# Patient Record
Sex: MALE | Race: WHITE | NOT HISPANIC OR LATINO | Employment: OTHER | URBAN - METROPOLITAN AREA
[De-identification: names, ages, dates, MRNs, and addresses within clinical notes are randomized per-mention and may not be internally consistent; named-entity substitution may affect disease eponyms.]

---

## 2021-05-17 ENCOUNTER — TRANSFERRED RECORDS (OUTPATIENT)
Dept: HEALTH INFORMATION MANAGEMENT | Facility: CLINIC | Age: 81
End: 2021-05-17

## 2021-08-25 ENCOUNTER — TELEPHONE (OUTPATIENT)
Dept: ORTHOPEDICS | Facility: CLINIC | Age: 81
End: 2021-08-25

## 2021-08-25 NOTE — TELEPHONE ENCOUNTER
RECORDS RECEIVED FROM: Compression on lower disk / Self / [facility]   DATE RECEIVED: Sep 2, 2021     NOTES STATUS DETAILS   OFFICE NOTE from referring provider In process    OFFICE NOTE from other specialist N/A    DISCHARGE SUMMARY from hospital N/A    DISCHARGE REPORT from the ER N/A    OPERATIVE REPORT N/A    MEDICATION LIST Internal    IMPLANT RECORD/STICKER N/A    LABS     CBC/DIFF N/A    CULTURES N/A    INJECTIONS DONE IN RADIOLOGY N/A    MRI In process    CT SCAN In process    XRAYS (IMAGES & REPORTS) In process    TUMOR     PATHOLOGY  Slides & report N/A    08/31/21   9:18 AM   PATIENT CALLED AND LVM SAYING HE HAS RECORDS. CALLED AND SPOKE TO PATIENT HE IS BRINGING HIS RECORDS AND IMAGES  Sylwia Sharp CMA    08/26/21   11:10 AM   CALLED PATIENT, LVM TO SEE IF THERE ARE ANY RECORDS/IMAGES  Sylwia Sharp CMA

## 2021-08-25 NOTE — TELEPHONE ENCOUNTER
[facility] Call Center    Phone Message    May a detailed message be left on voicemail: yes     Reason for Call Patient wants a call back on what He needs to bring to this Appt.Travel Screening: Not Applicable

## 2021-08-25 NOTE — TELEPHONE ENCOUNTER
LVM informing pt to bring a disc of images to his upcoming appt. Als to let us know where he had been previously seen. Provided call center number if pt has further questions.     Rema Cummins ATC

## 2021-08-27 DIAGNOSIS — M54.9 BACK PAIN: Primary | ICD-10-CM

## 2021-09-01 NOTE — TELEPHONE ENCOUNTER
Action 9.1.21 1:06 PM ESTRELLA   Action Taken Imaging disc received from [facility] and brought to [facility]. Report sent to scan

## 2021-09-02 ENCOUNTER — ANCILLARY PROCEDURE (OUTPATIENT)
Dept: CT IMAGING | Facility: CLINIC | Age: 81
End: 2021-09-02
Attending: ORTHOPAEDIC SURGERY
Payer: COMMERCIAL

## 2021-09-02 ENCOUNTER — PRE VISIT (OUTPATIENT)
Dept: ORTHOPEDICS | Facility: CLINIC | Age: 81
End: 2021-09-02

## 2021-09-02 ENCOUNTER — ANCILLARY PROCEDURE (OUTPATIENT)
Dept: GENERAL RADIOLOGY | Facility: CLINIC | Age: 81
End: 2021-09-02
Attending: ORTHOPAEDIC SURGERY
Payer: COMMERCIAL

## 2021-09-02 ENCOUNTER — OFFICE VISIT (OUTPATIENT)
Dept: ORTHOPEDICS | Facility: CLINIC | Age: 81
End: 2021-09-02
Payer: COMMERCIAL

## 2021-09-02 DIAGNOSIS — M54.9 BACK PAIN: ICD-10-CM

## 2021-09-02 DIAGNOSIS — M43.10 DEGENERATIVE SPONDYLOLISTHESIS: ICD-10-CM

## 2021-09-02 DIAGNOSIS — M48.061 LUMBAR SPINAL STENOSIS: ICD-10-CM

## 2021-09-02 DIAGNOSIS — M48.062 SPINAL STENOSIS OF LUMBAR REGION WITH NEUROGENIC CLAUDICATION: Primary | ICD-10-CM

## 2021-09-02 PROCEDURE — 72131 CT LUMBAR SPINE W/O DYE: CPT | Mod: GC | Performed by: RADIOLOGY

## 2021-09-02 PROCEDURE — 72082 X-RAY EXAM ENTIRE SPI 2/3 VW: CPT | Performed by: STUDENT IN AN ORGANIZED HEALTH CARE EDUCATION/TRAINING PROGRAM

## 2021-09-02 PROCEDURE — 99205 OFFICE O/P NEW HI 60 MIN: CPT | Performed by: PHYSICIAN ASSISTANT

## 2021-09-02 PROCEDURE — 72100 X-RAY EXAM L-S SPINE 2/3 VWS: CPT | Performed by: RADIOLOGY

## 2021-09-02 RX ORDER — TAMSULOSIN HYDROCHLORIDE 0.4 MG/1
0.4 CAPSULE ORAL EVERY EVENING
COMMUNITY
Start: 2021-07-12

## 2021-09-02 RX ORDER — GABAPENTIN 600 MG/1
600 TABLET ORAL 2 TIMES DAILY
COMMUNITY
Start: 2021-07-13

## 2021-09-02 NOTE — PROGRESS NOTES
REASON FOR CONSULTATION: Consult (Compression of lower spine )     REFERRING PHYSICIAN: Referred Self   PCP:No primary care provider on file.    History of Present Illness:    81 year old male who presents today for evaluation of progressively worsening back and leg pain.  Patient's main complaint today is that he cannot walk very far and that it hurts to walk.  He says that he has dealt with chronic back pain, but it has been progressively worsening over time.  He is slowly been less able to walk as far as he used to be, and has progressed to needing to use a cane when ambulating.  Pain is worse when he stands or walks and better if he sits.  Pain is localized to low back/buttock area and radiates into R>>L legs.  Pain improved with sitting and when leaning forward.  Positive shopping cart sign.  Denies bowel incontinence.  Admits to urge incontinence, difficulty making it to the bathroom in time when he needs to go.  Denies new weakness in legs.  Admits to neuropathy in legs.  Numbness to right calf/shin area.  Patient has tried physical therapy which was not helpful.  He had an injection in his low back which helped for maybe a month.  Is taking gabapentin.  No prior spine surgeries.    Back 20%, Leg 80%,  Right > Left  Worse: Standing and walking  Better: Sitting.  Positive shopping cart sign    Previous treatment:   - Physical therapy: Tried, but made pain worse.  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  Former smoker, quit 1972  Lives alone.  Son lives a few miles down the road      Oswestry (CHRISTIAN) Questionnaire    OSWESTRY DISABILITY INDEX 9/2/2021   Count 9   Sum 25   Oswestry Score (%) 55.56        ROS:  A 12-point review of systems was completed and is negative except for otherwise noted above in the  history of present illness.    Med Hx:  No past medical history on file.    Surg Hx:  No past surgical history on file.    Allergies:  Allergies   Allergen Reactions     Latex        Meds:  Current Outpatient Medications   Medication     gabapentin (NEURONTIN) 600 MG tablet     tamsulosin (FLOMAX) 0.4 MG capsule     No current facility-administered medications for this visit.       Fam Hx:  No family history on file.    P/S Hx:  Social History     Tobacco Use     Smoking status: Not on file   Substance Use Topics     Alcohol use: Not on file         Physical Exam:  Very pleasant, healthy appearing, alert, oriented x 3, cooperative.  Normal mood and affect.  Not in cardiorespiratory distress.  There were no vitals taken for this visit.  Thoracolumbar kyphosis.  Stands with knees bent.  Standing upright increases low back and leg pains.  Normal gait with assistive device.  No antalgia / imbalance.  unable to walk on toes and on heels with ease.      Back: no deformity, no skin lesions or surgical scars.  Localizes pain at right low back/buttock.  Tenderness: (-) midline, (-) paraspinal, (-) R and L PSIS.      Neuro Exam:  Motor:    Unable to do1 legged toe raise on right side. (weakness to right S1)    LOWER EXTREMITY Left Right   Hip flexion 5/5 4/5   Knee flexion 5/5 5/5   Knee extension 5/5 5/5   Ankle dorsiflexion 5/5 5/5   Ankle plantarflexion 5/5 5/5   Great toe extension 5/5 5/5      Sensory:  Intact to light touch in both LE's.   Reflexes:  Knee 1+ bilat.  Ankle 1+ bilat.  (-) Babinski, (-) clonus.    Lower Extremity:  Equal leg lengths, full pulses, (-) atrophy / asymmetry.  Full painless passive knee and ankle motion.  - log roll. - hip ROM bilat    Straight leg raise: (-) right, (-) left.  Hip impingement: (-) right, (-) left.    Imagin21 EOS full spine standing AP/lateral views: multilevel lumbar, thoracic and cervical spondylosis. Grade 1 spondylolisthesis L3-4 . thoracolumbar kyphosis. right  reverse total shoulder. mild arthritis bilateral hips. no acute fracture.   sagittal spine measures:  LL: 23 degrees, ideal 47.5 degrees.   L4-S1: 28 degrees, ideal 32 degrees.   PI: 39 .   PI-LL mismatch: 16 degrees.   PT: 6.   SVA: +16.8 cm.   TPA: 28.   GT: 35.   T10-L2: 27 degrees kyphosis.   TK: 70     9/2/2020 XR lumbar spine flexion/extension views: No gross instability or motion of L3-4 spondylolisthesis.    5/17/2021 MRI lumbar spine without contrast: multilevel spondylosis. significant moderate-severe spinal canal stenosis L3-4 and moderate spinal canal stenosis L2-3. Mild to moderate spinal canal stenosis L4-5.    Impression:   1.  Gr. 1 degenerative spondylolisthesis without gross instability L3-4.  2.  Spinal stenosis L2-3 and L3-4, milder at L4-5, with neurogenic claudication.  3.  Advanced multilevel lumbar spondylosis  4.  Lumbar and Thoracolumbar flat back deformity (LL = 23 deg, ideal 47.5; T10-L2 = 27 deg kyphosis).  5.  Refuses blood transfusion ([place]).    Plan:   Reviewed XR, CT, MR images with patient today to help explain this problem.  He has significant spinal canal stenosis at the L2-3 and L3-4 levels.  This is the cause of his worsening low back and neurogenic claudication symptoms.  He does have some mild spondylolisthesis at L3-4 level, but flexion/extension views demonstrate no gross instability of this.  Therefore, we would recommend avoiding fusion surgery for this patient.  We discussed nonoperative treatment options for spinal stenosis and neurogenic claudication.  He has already completed physical therapy, injections, medications without any significant or long-lasting relief of symptoms.  Therefore, would recommend at this time decompression surgery.  Reviewed risks and benefits of the procedure with patient and expected recovery time.  Patient's daughter was on the phone during the visit and asked many appropriate questions. Discussed that this is not a problem  requiring urgent/emergent surgical intervention.    We reviewed the risks and benefits of the surgery in detail. The risks include those associated with anesthesia, including death, pulmonary embolism, DVT, stroke, myocardial infarction, pneumonia, blindness, and urinary tract infection. Additional risks include the risk of blood loss, infection, nerve damage, failure to heal, hardware problems and failure of the intervention to improve his symptoms.  With regard to blood loss, we use a medication called tranexamic acid.  We are also able to return some blood via Cell Saver.  To mitigate the risk of infection, we use antibiotics, both IV and in the wound.   He understands the risks of the surgery and wishes to proceed.     - CT lumbar spine  - XR flex/ext lumbar - done on way out of clinic.  Did not show gross instability at L3-4.  - PAC referral  - Case request:  Open laminectomies L2-L4 (2 levels).    Invited to participate in SLIP2 study; explained study to patient. Amenable.  Gave patient name to research fellow Dr. Mario Delgado.    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     Total visit time > 60 mins, > 50% counseling and coordination of care.    Respectfully,    Alissa Giles PA-C    Attestation:  I (Dr. Shaka Manriquez - Spine Surgeon) have personally evaluated patient with PAGE Giles, and agree with findings and plan outlined in the note, which I also edited.  I discussed at length with the patient/family, explained the nature of spinal condition, and formulated workup and/or treatment plan together.  All questions were answered to the best of my ability and to patient's apparent satisfaction.      Shaka Manriquez MD    Orthopaedic Spine Surgery  Dept Orthopaedic Surgery, [facility]  [phone] office, [phone]  pager  [email or web address]    Addendum 10/4/2021:  I received SLIP 2 study results as follows:  15 spine experts reviewed patient's images.  13/15 (87%) recommended decompression only (5 minimally invasive; 8 open).  1/15 (6%) recommended fusion.  1/15 (6%) recommended no surgery.  However, in the surgeon's comments, he/she apparently wanted more information or further work-up prior to deciding between fusion versus decompression.    I called the patient and relayed the study results to him.  We also discussed at length regarding the various options.  He is currently scheduled for two-level open decompression surgery on 11/1/2021.  He expressed good understanding and agreement.  No change to surgical plan.

## 2021-09-02 NOTE — LETTER
9/2/2021         RE: Guevara Jones  [address]  [place] [place] [facility] [address]        Dear Colleague,    Thank you for referring your patient, Guevara Jones, to the [facility] [place]. Please see a copy of my visit note below.    REASON FOR CONSULTATION: Consult (Compression of lower spine )     REFERRING PHYSICIAN: Referred Self   PCP:No primary care provider on file.    History of Present Illness:    81 year old male who presents today for evaluation of progressively worsening back and leg pain.  Patient's main complaint today is that he cannot walk very far and that it hurts to walk.  He says that he has dealt with chronic back pain, but it has been progressively worsening over time.  He is slowly been less able to walk as far as he used to be, and has progressed to needing to use a cane when ambulating.  Pain is worse when he stands or walks and better if he sits.  Pain is localized to low back/buttock area and radiates into R>>L legs.  Pain improved with sitting and when leaning forward.  Positive shopping cart sign.  Denies bowel incontinence.  Admits to urge incontinence, difficulty making it to the bathroom in time when he needs to go.  Denies new weakness in legs.  Admits to neuropathy in legs.  Numbness to right calf/shin area.  Patient has tried physical therapy which was not helpful.  He had an injection in his low back which helped for maybe a month.  Is taking gabapentin.  No prior spine surgeries.    Back 20%, Leg 80%,  Right > Left  Worse: Standing and walking  Better: Sitting.  Positive shopping cart sign    Previous treatment:   - Physical therapy: Tried, but made pain worse.  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  Former smoker, quit 1972  Lives alone.   Son lives a few miles down the road      Oswestry (CHRISTIAN) Questionnaire    OSWESTRY DISABILITY INDEX 9/2/2021   Count 9   Sum 25   Oswestry Score (%) 55.56        ROS:  A 12-point review of systems was completed and is negative except for otherwise noted above in the history of present illness.    Med Hx:  No past medical history on file.    Surg Hx:  No past surgical history on file.    Allergies:  Allergies   Allergen Reactions     Latex        Meds:  Current Outpatient Medications   Medication     gabapentin (NEURONTIN) 600 MG tablet     tamsulosin (FLOMAX) 0.4 MG capsule     No current facility-administered medications for this visit.       Fam Hx:  No family history on file.    P/S Hx:  Social History     Tobacco Use     Smoking status: Not on file   Substance Use Topics     Alcohol use: Not on file         Physical Exam:  Very pleasant, healthy appearing, alert, oriented x 3, cooperative.  Normal mood and affect.  Not in cardiorespiratory distress.  There were no vitals taken for this visit.  Thoracolumbar kyphosis.  Stands with knees bent.  Standing upright increases low back and leg pains.  Normal gait with assistive device.  No antalgia / imbalance.  unable to walk on toes and on heels with ease.      Back: no deformity, no skin lesions or surgical scars.  Localizes pain at right low back/buttock.  Tenderness: (-) midline, (-) paraspinal, (-) R and L PSIS.      Neuro Exam:  Motor:    Unable to do1 legged toe raise on right side. (weakness to right S1)    LOWER EXTREMITY Left Right   Hip flexion 5/5 4/5   Knee flexion 5/5 5/5   Knee extension 5/5 5/5   Ankle dorsiflexion 5/5 5/5   Ankle plantarflexion 5/5 5/5   Great toe extension 5/5 5/5      Sensory:  Intact to light touch in both LE's.   Reflexes:  Knee 1+ bilat.  Ankle 1+ bilat.  (-) Babinski, (-) clonus.    Lower Extremity:  Equal leg lengths, full pulses, (-) atrophy / asymmetry.  Full painless passive knee and ankle motion.  - log roll. - hip ROM  bilat    Straight leg raise: (-) right, (-) left.  Hip impingement: (-) right, (-) left.    Imagin21 EOS full spine standing AP/lateral views: multilevel lumbar, thoracic and cervical spondylosis. Grade 1 spondylolisthesis L3-4 . thoracolumbar kyphosis. right reverse total shoulder. mild arthritis bilateral hips. no acute fracture.   sagittal spine measures:  LL: 23 degrees, ideal 47.5 degrees.   L4-S1: 28 degrees, ideal 32 degrees.   PI: 39 .   PI-LL mismatch: 16 degrees.   PT: 6.   SVA: +16.8 cm.   TPA: 28.   GT: 35.   T10-L2: 27 degrees kyphosis.   TK: 70     2020 XR lumbar spine flexion/extension views: No gross instability or motion of L3-4 spondylolisthesis.    2021 MRI lumbar spine without contrast: multilevel spondylosis. significant moderate-severe spinal canal stenosis L3-4 and moderate spinal canal stenosis L2-3. Mild to moderate spinal canal stenosis L4-5.    Impression:   1.  Gr. 1 degenerative spondylolisthesis without gross instability L3-4.  2.  Spinal stenosis L2-3 and L3-4, milder at L4-5, with neurogenic claudication.  3.  Advanced multilevel lumbar spondylosis  4.  Lumbar and Thoracolumbar flat back deformity (LL = 23 deg, ideal 47.5; T10-L2 = 27 deg kyphosis).  5.  Refuses blood transfusion (Spiritism).    Plan:   Reviewed XR, CT, MR images with patient today to help explain this problem.  He has significant spinal canal stenosis at the L2-3 and L3-4 levels.  This is the cause of his worsening low back and neurogenic claudication symptoms.  He does have some mild spondylolisthesis at L3-4 level, but flexion/extension views demonstrate no gross instability of this.  Therefore, we would recommend avoiding fusion surgery for this patient.  We discussed nonoperative treatment options for spinal stenosis and neurogenic claudication.  He has already completed physical therapy, injections, medications without any significant or long-lasting relief of symptoms.  Therefore, would  recommend at this time decompression surgery.  Reviewed risks and benefits of the procedure with patient and expected recovery time.  Patient's daughter was on the phone during the visit and asked many appropriate questions. Discussed that this is not a problem requiring urgent/emergent surgical intervention.    We reviewed the risks and benefits of the surgery in detail. The risks include those associated with anesthesia, including death, pulmonary embolism, DVT, stroke, myocardial infarction, pneumonia, blindness, and urinary tract infection. Additional risks include the risk of blood loss, infection, nerve damage, failure to heal, hardware problems and failure of the intervention to improve his symptoms.  With regard to blood loss, we use a medication called tranexamic acid.  We are also able to return some blood via Cell Saver.  To mitigate the risk of infection, we use antibiotics, both IV and in the wound.   He understands the risks of the surgery and wishes to proceed.     - CT lumbar spine  - XR flex/ext lumbar - done on way out of clinic.  Did not show gross instability at L3-4.  - PAC referral  - Case request:  Open laminectomies L2-L4 (2 levels).    Invited to participate in SLIP2 study; explained study to patient. Amenable.  Gave patient name to research fellow Dr. Mario Delgado.    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     Total visit time > 60 mins, > 50% counseling and coordination of care.    Respectfully,    Alissa Giles PA-C    Attestation:  I (Dr. Shaka Manriquez - Spine Surgeon) have personally evaluated patient with PAGE Giles, and agree with findings and plan outlined in the note, which I also edited.  I discussed at length with the patient/family, explained the nature of spinal condition, and formulated workup and/or treatment plan  together.  All questions were answered to the best of my ability and to patient's apparent satisfaction.      Shaka Manriquez MD    Orthopaedic Spine Surgery  Dept Orthopaedic Surgery, [facility]  [phone] office, [phone] pager  [email or web address]    Addendum 10/4/2021:  I received SLIP 2 study results as follows:  15 spine experts reviewed patient's images.  13/15 (87%) recommended decompression only (5 minimally invasive; 8 open).  1/15 (6%) recommended fusion.  1/15 (6%) recommended no surgery.  However, in the surgeon's comments, he/she apparently wanted more information or further work-up prior to deciding between fusion versus decompression.    I called the patient and relayed the study results to him.  We also discussed at length regarding the various options.  He is currently scheduled for two-level open decompression surgery on 11/1/2021.  He expressed good understanding and agreement.  No change to surgical plan.        Again, thank you for allowing me to participate in the care of your patient.        Sincerely,        Shaka Manriquez MD

## 2021-09-02 NOTE — LETTER
Date:October 9, 2021      Patient was self referred, no letter generated. Do not send.        [facility] Information

## 2021-09-03 ENCOUNTER — TELEPHONE (OUTPATIENT)
Dept: ORTHOPEDICS | Facility: CLINIC | Age: 81
End: 2021-09-03

## 2021-09-03 NOTE — TELEPHONE ENCOUNTER
Patient is scheduled for surgery with Dr. Manriquez    Spoke with: Flo    Date of Surgery: 11/1/21    Location: [place]    Informed patient they will need an adult  Yes    Pre op with Provider N/A    H&P: Scheduled with PAC 10/11    Pre-procedure COVID-19 Test: Will schedule with covid scheduling team    Additional imaging/appointments: N/A    Surgery packet: Given in clinic     Additional comments: N/A

## 2021-09-07 NOTE — TELEPHONE ENCOUNTER
FUTURE VISIT INFORMATION      SURGERY INFORMATION:    Date: 11/1/21    Location: [facility]    Surgeon:  Shaka Manriquez MD    Anesthesia Type:  general    Procedure: Open laminectomies lumbar 2-4 (2 levels).     Consult: ov 9/2/21    RECORDS REQUESTED FROM:       Most recent ECHO: 7/9/20- [facility]    Most recent Cardiac Stress Test:  5/10/17- [facility]

## 2021-09-10 ENCOUNTER — TELEPHONE (OUTPATIENT)
Dept: ORTHOPEDICS | Facility: CLINIC | Age: 81
End: 2021-09-10

## 2021-09-10 NOTE — TELEPHONE ENCOUNTER
[facility] Call Center    Phone Message    May a detailed message be left on voicemail: yes     Reason for Call: Other: Patient would like to know if the PAC eval can be done at any location that would be closer to him? And if the PAC testing is something you have to stay overnight for? Patient also had a question, prior to surgery if he is able to still take his Gabapentin?       Action Taken: Message routed to:  [facility] ([facility] Ortho    Travel Screening: Not Applicable

## 2021-09-10 NOTE — TELEPHONE ENCOUNTER
Called patient and answered various pre-operative questions.  Informed PAC has to be done in [place]. Surgery is also in [place]. OK to continue gabapentin until surgery date.    LILIA VinsonC

## 2021-10-01 ENCOUNTER — TELEPHONE (OUTPATIENT)
Dept: ORTHOPEDICS | Facility: CLINIC | Age: 81
End: 2021-10-01

## 2021-10-01 NOTE — TELEPHONE ENCOUNTER
[facility] Call Center    Phone Message    May a detailed message be left on voicemail: yes     Reason for Call Patient called concerned about Pre Op dates location hotels in [place] for [place] etc . Please call Patient   Action Taken: Message routed to:  [facility] yolanda    Travel Screening: Not Applicable

## 2021-10-04 DIAGNOSIS — Z11.59 ENCOUNTER FOR SCREENING FOR OTHER VIRAL DISEASES: ICD-10-CM

## 2021-10-11 ENCOUNTER — ANESTHESIA EVENT (OUTPATIENT)
Dept: SURGERY | Facility: CLINIC | Age: 81
End: 2021-10-11

## 2021-10-11 ENCOUNTER — OFFICE VISIT (OUTPATIENT)
Dept: SURGERY | Facility: CLINIC | Age: 81
End: 2021-10-11
Payer: COMMERCIAL

## 2021-10-11 ENCOUNTER — PRE VISIT (OUTPATIENT)
Dept: SURGERY | Facility: CLINIC | Age: 81
End: 2021-10-11

## 2021-10-11 ENCOUNTER — LAB (OUTPATIENT)
Dept: LAB | Facility: CLINIC | Age: 81
End: 2021-10-11
Payer: COMMERCIAL

## 2021-10-11 VITALS
TEMPERATURE: 97.8 F | HEIGHT: 69 IN | DIASTOLIC BLOOD PRESSURE: 84 MMHG | OXYGEN SATURATION: 94 % | SYSTOLIC BLOOD PRESSURE: 171 MMHG | WEIGHT: 202.5 LBS | RESPIRATION RATE: 20 BRPM | BODY MASS INDEX: 29.99 KG/M2 | HEART RATE: 76 BPM

## 2021-10-11 DIAGNOSIS — M48.062 SPINAL STENOSIS OF LUMBAR REGION WITH NEUROGENIC CLAUDICATION: ICD-10-CM

## 2021-10-11 DIAGNOSIS — Z01.818 PRE-OP EXAMINATION: Primary | ICD-10-CM

## 2021-10-11 DIAGNOSIS — Z91.89 POTENTIAL FOR DEFICIENT KNOWLEDGE OF CONGESTIVE HEART FAILURE: ICD-10-CM

## 2021-10-11 DIAGNOSIS — Z01.818 PRE-OP EXAMINATION: ICD-10-CM

## 2021-10-11 LAB
ANION GAP SERPL CALCULATED.3IONS-SCNC: 7 MMOL/L (ref 3–14)
BUN SERPL-MCNC: 12 MG/DL (ref 7–30)
CALCIUM SERPL-MCNC: 9.3 MG/DL (ref 8.5–10.1)
CHLORIDE BLD-SCNC: 108 MMOL/L (ref 94–109)
CO2 SERPL-SCNC: 28 MMOL/L (ref 20–32)
CREAT SERPL-MCNC: 0.89 MG/DL (ref 0.66–1.25)
ERYTHROCYTE [DISTWIDTH] IN BLOOD BY AUTOMATED COUNT: 14 % (ref 10–15)
GFR SERPL CREATININE-BSD FRML MDRD: 81 ML/MIN/1.73M2
GLUCOSE BLD-MCNC: 104 MG/DL (ref 70–99)
HCT VFR BLD AUTO: 43.3 % (ref 40–53)
HGB BLD-MCNC: 13.7 G/DL (ref 13.3–17.7)
HOLD SPECIMEN: NORMAL
MCH RBC QN AUTO: 29.3 PG (ref 26.5–33)
MCHC RBC AUTO-ENTMCNC: 31.6 G/DL (ref 31.5–36.5)
MCV RBC AUTO: 93 FL (ref 78–100)
NT-PROBNP SERPL-MCNC: 71 PG/ML (ref 0–450)
PLATELET # BLD AUTO: 244 10E3/UL (ref 150–450)
POTASSIUM BLD-SCNC: 4.1 MMOL/L (ref 3.4–5.3)
RBC # BLD AUTO: 4.67 10E6/UL (ref 4.4–5.9)
SODIUM SERPL-SCNC: 143 MMOL/L (ref 133–144)
WBC # BLD AUTO: 9.8 10E3/UL (ref 4–11)

## 2021-10-11 PROCEDURE — 83880 ASSAY OF NATRIURETIC PEPTIDE: CPT | Mod: GA | Performed by: PATHOLOGY

## 2021-10-11 PROCEDURE — 85027 COMPLETE CBC AUTOMATED: CPT | Performed by: PATHOLOGY

## 2021-10-11 PROCEDURE — 80048 BASIC METABOLIC PNL TOTAL CA: CPT | Performed by: PATHOLOGY

## 2021-10-11 PROCEDURE — 36415 COLL VENOUS BLD VENIPUNCTURE: CPT | Performed by: PATHOLOGY

## 2021-10-11 PROCEDURE — 99204 OFFICE O/P NEW MOD 45 MIN: CPT | Performed by: PHYSICIAN ASSISTANT

## 2021-10-11 PROCEDURE — 93000 ELECTROCARDIOGRAM COMPLETE: CPT | Performed by: INTERNAL MEDICINE

## 2021-10-11 RX ORDER — ASPIRIN 325 MG
325 TABLET ORAL EVERY MORNING
Status: ON HOLD | COMMUNITY
End: 2021-11-04

## 2021-10-11 RX ORDER — GARLIC 200 MG
TABLET ORAL
COMMUNITY

## 2021-10-11 RX ORDER — COVID-19 ANTIGEN TEST
220 KIT MISCELLANEOUS DAILY PRN
Status: ON HOLD | COMMUNITY
End: 2021-11-02

## 2021-10-11 RX ORDER — ALFALFA 250 MG
TABLET ORAL
COMMUNITY

## 2021-10-11 RX ORDER — IBUPROFEN 600 MG/1
TABLET, FILM COATED ORAL
Status: ON HOLD | COMMUNITY
End: 2021-11-02

## 2021-10-11 RX ORDER — AMPICILLIN TRIHYDRATE 250 MG
600 CAPSULE ORAL DAILY
COMMUNITY

## 2021-10-11 ASSESSMENT — PAIN SCALES - GENERAL: PAINLEVEL: MODERATE PAIN (4)

## 2021-10-11 ASSESSMENT — MIFFLIN-ST. JEOR: SCORE: 1618.91

## 2021-10-11 ASSESSMENT — ENCOUNTER SYMPTOMS: SEIZURES: 0

## 2021-10-11 ASSESSMENT — LIFESTYLE VARIABLES: TOBACCO_USE: 1

## 2021-10-11 NOTE — ANESTHESIA PREPROCEDURE EVALUATION
Anesthesia Pre-Procedure Evaluation    Patient: Guevara Jones   MRN: [id number] : 1940        Preoperative Diagnosis: * No surgery found *    Procedure :   PAC EVALUATION       Past Medical History:   Diagnosis Date     Ataxia      BPH (benign prostatic hyperplasia)      History of concussion      History of CVA (cerebrovascular accident)      HLD (hyperlipidemia)      HTN (hypertension)      Osteoarthritis      Spinal stenosis of lumbar region with neurogenic claudication       Past Surgical History:   Procedure Laterality Date     TOTAL SHOULDER ARTHROPLASTY Right       Allergies   Allergen Reactions     Latex       Social History     Tobacco Use     Smoking status: Not on file   Substance Use Topics     Alcohol use: Not on file      Wt Readings from Last 1 Encounters:   No data found for Wt        Anesthesia Evaluation   Pt has had prior anesthetic. Type: General.    No history of anesthetic complications       ROS/MED HX  ENT/Pulmonary:  - neg pulmonary ROS   (+) tobacco use (quit in ), Past use,     Neurologic: Comment: History of concussion    (+) CVA (seen on imaging on 2020. Patient without symptoms), date: 2020,  (-) no seizures   Cardiovascular:     (+) Dyslipidemia hypertension-----Previous cardiac testing   Echo: Date: 20 Results:  Interpretation Summary  The left ventricular systolic function is normal.  Left atrium is moderately enlarged by volume.  The left ventricular diastolic function is mildly abnormal (Grade I).  There is mild mitral regurgitation.  TR signal inadequate to allow accurate estimate RV systolic pressure.  No color doppler evidence for atrial septal defect or patent foramen ovale. A bubble study was not performed.    Stress Test: Date:  Results:  Interpretation Summary   1. Negative exercise stress echocardiogram for inducible ischemia.   2. Normal size left ventricle with normal systolic function. EF 60%.   3. No significant valvular regurgitation or  stenosis.   4. Very functional capacity for gender and age with appropriate heart rate and blood pressure response to exercise.    ECG Reviewed: Date: 10/11/21 Results:  Sinus rhythm, inferior infarct age undetermined.   Cath:  Date: Results:      METS/Exercise Tolerance: 3 - Able to walk 1-2 blocks without stopping    Hematologic: Comments: Patient is a Mandaeism   (-) history of blood clots and history of blood transfusion   Musculoskeletal: Comment: Spinal stenosis with neurogenic claudication      (+) arthritis (s/p right total shoulder),     GI/Hepatic:  - neg GI/hepatic ROS   (+) GERD, Asymptomatic on medication,     Renal/Genitourinary:     (+) BPH,     Endo:  - neg endo ROS     Psychiatric/Substance Use:  - neg psychiatric ROS     Infectious Disease:  - neg infectious disease ROS     Malignancy:  - neg malignancy ROS     Other:  - neg other ROS          Physical Exam    Airway        Mallampati: I   TM distance: > 3 FB   Neck ROM: limited   Mouth opening: > 3 cm    Respiratory Devices and Support         Dental       (+) upper dentures and missing      Cardiovascular   cardiovascular exam normal          Pulmonary   pulmonary exam normal                OUTSIDE LABS:  CBC: No results found for: WBC, HGB, HCT, PLT  BMP: No results found for: NA, POTASSIUM, CHLORIDE, CO2, BUN, CR, GLC  COAGS: No results found for: PTT, INR, FIBR  POC: No results found for: BGM, HCG, HCGS  HEPATIC: No results found for: ALBUMIN, PROTTOTAL, ALT, AST, GGT, ALKPHOS, BILITOTAL, BILIDIRECT, RANDOLPH  OTHER: No results found for: PH, LACT, A1C, TALHA, PHOS, MAG, LIPASE, AMYLASE, TSH, T4, T3, CRP, SED          PAC Discussion and Assessment    ASA Classification: 3  Case is suitable for: [place]  Anesthetic techniques and relevant risks discussed: GA                  PAC Resident/NP Anesthesia Assessment: Guevara Jones is a 81 year old male who is scheduled for Open laminectomies lumbar 2-4 (2 levels). on 11/1/21 by Dr. Manriquez  in treatment of Spinal stenosis of lumbar region with neurogenic claudication.  PAC referral for risk assessment and optimization for anesthesia with comorbid conditions of HTN, HLD, History of CVA, history of concussion, ataxia, BPH, osteoarthritis:    Pre-operative considerations:  1.  Cardiac:  Functional status- METS 3, the patient is able to walk 1 block but then has to stop due to intense pain from his back. He denies chest tightness or pain. He does note that he feels he has some shortness of breath while sitting but denies any with exercise. He had an echo on 7/7/20 with normal EF 60.1%, grade I diastolic dysfunction and no wall motion. He has not had a change in his functional status since this testing. EKG was completed today as no prior record of EKG and this showed normal sinus rhythm with inferior infarct age undetermined. Discussed with Dr. Stephens and will get BNP and unless greatly elevated no further testing indicated given his previous testing and no changes.  Intermediate risk surgery with 0.9% (RCRI #) risk of major adverse cardiac event.   ~ HTN/ HLD - Will hold ASA 325mg for 7 days prior    2.  Pulm:  Airway feasible.  DIANDRA risk: Intermediate (male, age, HTN)  ~ Former smoker - remote and quit in 1970.     3. Neuro: History of CVA 7/2020 - seen on imaging only. The patient never had any symptoms.   ~ History of concussion in 7/2020.   ~ Ataxia - the patient uses a cane. Fall precautions indicated.     4. GI:  Risk of PONV score = 2.  If > 2, anti-emetic intervention recommended.  ~ GERD - patient uses TUMS PRN for intermittent food related symptoms.     5. : BPH - continue flomax. He denies UTI symptoms.     6. Musculoskeletal: osteoarthritis - s/p right TSA  ~ Spinal stenosis with neurogenic claudication - procedure as above.     7. Heme: The patient is a Mosque. Will check CBC with iron as no prior labs to review. He is open to cell saver if needed.     VTE risk: 1.8%    Patient  is optimized and is acceptable candidate for the proposed procedure.  No further diagnostic evaluation is needed.     Patient discussed with Dr. Stephens    For further details of assessment, testing, and physical exam please see H and P completed on same date.    Meenakshi Davies PA-C      Mid-Level Provider/Resident: Meenakshi Daveis PA-C  Date: 10/11/21        Reviewed and Signed by PAC Anesthesiologist  Anesthesiologist: Angelo  Date: 10/11/21                     Meenakshi Davies PA-C

## 2021-10-11 NOTE — H&P
Pre-Operative H & P     CC:  Preoperative exam to assess for increased cardiopulmonary risk while undergoing surgery and anesthesia.    Date of Encounter: 10/11/2021  Primary Care Physician:  No primary care provider on file.     Reason for visit:   Encounter Diagnoses   Name Primary?     Pre-op examination Yes     Spinal stenosis of lumbar region with neurogenic claudication      Potential for deficient knowledge of congestive heart failure        HPI  Guevara Jones is a 81 year old male who presents for pre-operative H & P in preparation for Open laminectomies lumbar 2-4 (2 levels). with Dr. Manriquez on 11/1/21 at [facility].     The patient is an 81-year-old man with a past medical history significant for hypertension, hyperlipidemia, history of CVA, history of concussion, ataxia, BPH, osteoarthritis and spinal stenosis with neurogenic claudication.  Given his ongoing back pain with radiation down his leg he met with Dr. Morse on 9/2/2021.  He reported that due to the pain which has progressively worsened he is now needing to use a cane.  The patient is well has some urinary urge incontinence.  He has tried physical therapy as well as then epidural steroid injection which has not given him long-term relief.  Given his ongoing symptoms as well as significant spinal canal stenosis they discussed his surgical treatment options.  The patient is now scheduled for the procedure as above.    History is obtained from the patient and chart review      Hx of abnormal bleeding or anti-platelet use:  mg - hold 7 days prior    Menstrual history: No LMP for male patient.:     Prior to Admission Medications  Current Outpatient Medications   Medication Sig Dispense Refill     [place] 250 MG TABS 2 tablets       Ascorbic Acid (VITAMIN C) POWD        aspirin (ASA) 325 MG tablet Take 325 mg by mouth every morning       cod liver oil CAPS capsule        gabapentin (NEURONTIN)  600 MG tablet Take 600 mg by mouth 2 times daily 1 in the morning, two in the evening       Glucosamine-Chondroitin--300-250 MG TABS        ibuprofen (ADVIL/MOTRIN) 600 MG tablet 1 tablet with food or milk as needed       naproxen sodium 220 MG capsule Take 220 mg by mouth daily as needed       PHOSPHATIDYL CHOLINE PO        red yeast rice 600 MG CAPS Take 600 mg by mouth daily       tamsulosin (FLOMAX) 0.4 MG capsule Take 0.4 mg by mouth every evening          Family History  Family History   Problem Relation Age of Onset     Anesthesia Reaction No family hx of      Bleeding Disorder No family hx of      Clotting Disorder No family hx of        The complete review of systems is negative other than noted in the HPI or here.     Preprocedure Note     Last edited 10/11/21 1216 by Meenakshi Davies PA-C  Date of Service 10/11/21 104  Creation Time 10/11/21 104  Status: Addendum             Anesthesia Pre-Procedure Evaluation    Patient: Guevara Jones   MRN: [id number] : 1940        Preoperative Diagnosis: * No surgery found *    Procedure :   PAC EVALUATION       Past Medical History:   Diagnosis Date     Ataxia      BPH (benign prostatic hyperplasia)      History of concussion      History of CVA (cerebrovascular accident)      HLD (hyperlipidemia)      HTN (hypertension)      Osteoarthritis      Spinal stenosis of lumbar region with neurogenic claudication       Past Surgical History:   Procedure Laterality Date     TOTAL SHOULDER ARTHROPLASTY Right       Allergies   Allergen Reactions     Latex       Social History     Tobacco Use     Smoking status: Not on file   Substance Use Topics     Alcohol use: Not on file      Wt Readings from Last 1 Encounters:   No data found for Wt        Anesthesia Evaluation   Pt has had prior anesthetic. Type: General.    No history of anesthetic complications       ROS/MED HX  ENT/Pulmonary:  - neg pulmonary ROS   (+) tobacco use (quit in ), Past use,      Neurologic: Comment: History of concussion    (+) CVA (seen on imaging on 7/2020. Patient without symptoms), date: 7/2020,  (-) no seizures   Cardiovascular:     (+) Dyslipidemia hypertension-----Previous cardiac testing   Echo: Date: 7/7/20 Results:  Interpretation Summary  The left ventricular systolic function is normal.  Left atrium is moderately enlarged by volume.  The left ventricular diastolic function is mildly abnormal (Grade I).  There is mild mitral regurgitation.  TR signal inadequate to allow accurate estimate RV systolic pressure.  No color doppler evidence for atrial septal defect or patent foramen ovale. A bubble study was not performed.    Stress Test: Date: 2017 Results:  Interpretation Summary   1. Negative exercise stress echocardiogram for inducible ischemia.   2. Normal size left ventricle with normal systolic function. EF 60%.   3. No significant valvular regurgitation or stenosis.   4. Very functional capacity for gender and age with appropriate heart rate and blood pressure response to exercise.    ECG Reviewed: Date: 10/11/21 Results:  Sinus rhythm, inferior infarct age undetermined.   Cath:  Date: Results:      METS/Exercise Tolerance: 3 - Able to walk 1-2 blocks without stopping    Hematologic: Comments: Patient is a Rastafari   (-) history of blood clots and history of blood transfusion   Musculoskeletal: Comment: Spinal stenosis with neurogenic claudication      (+) arthritis (s/p right total shoulder),     GI/Hepatic:  - neg GI/hepatic ROS   (+) GERD, Asymptomatic on medication,     Renal/Genitourinary:     (+) BPH,     Endo:  - neg endo ROS     Psychiatric/Substance Use:  - neg psychiatric ROS     Infectious Disease:  - neg infectious disease ROS     Malignancy:  - neg malignancy ROS     Other:  - neg other ROS          BP (!) 171/84 (BP Location: Left arm, Patient Position: Sitting, Cuff Size: Adult Regular)   Pulse 76   Temp 97.8  F (36.6  C) (Oral)   Resp 20   Ht  "1.753 m (5' 9\")   Wt 91.9 kg (202 lb 8 oz)   SpO2 94%   BMI 29.90 kg/m           Physical Exam  Constitutional: Awake, alert, cooperative, no apparent distress, and appears stated age.  Eyes: Pupils equal, round and reactive to light, extra ocular muscles intact, sclera clear, conjunctiva normal.  HENT: Normocephalic, oral pharynx with moist mucus membranes, upper dentures and multiple missing lower teeth. No goiter appreciated.   Respiratory: Clear to auscultation bilaterally, no crackles or wheezing.  Cardiovascular: Regular rate and rhythm, normal S1 and S2, and no murmur noted.  Carotids +2, no bruits. Slight lower extremity edema. Palpable pulses to radial  DP and PT arteries.   GI: Normal bowel sounds, soft, non-distended, non-tender, obese  Lymph/Hematologic: No cervical lymphadenopathy and no supraclavicular lymphadenopathy.  Genitourinary:  defer  Skin: Warm and dry.  No rashes at anticipated surgical site.   Musculoskeletal: Limited ROM of neck. There is no redness, warmth, or swelling of the joints. Gross motor strength is normal.    Neurologic: Awake, alert, oriented to name, place and time. Cranial nerves II-XII are grossly intact. Gait is impaired - patient uses cane.   Neuropsychiatric: Calm, cooperative. Normal affect.     PRIOR LABS/DIAGNOSTIC STUDIES:   All labs and imaging personally reviewed     EKG- if available please see in ROS above   Echo 7/7/20  Interpretation Summary  The left ventricular systolic function is normal.  Left atrium is moderately enlarged by volume.  The left ventricular diastolic function is mildly abnormal (Grade I).  There is mild mitral regurgitation.  TR signal inadequate to allow accurate estimate RV systolic pressure.  No color doppler evidence for atrial septal defect or patent foramen ovale. A bubble study was not performed.    MR brain 6/20/20  IMPRESSION: Cerebral atrophy with white matter disease likely related to chronic small vessel ischemic change. " Ventricles and sulci appear concordant. There are areas of encephalomalacia within the right frontal lobe and left cerebellar hemisphere. No mass or recent infarct.       Stress echo 2017  Interpretation Summary   1. Negative exercise stress echocardiogram for inducible ischemia.   2. Normal size left ventricle with normal systolic function. EF 60%.   3. No significant valvular regurgitation or stenosis.   4. Very functional capacity for gender and age with appropriate heart rate and blood pressure response to exercise.    [place] US  IMPRESSION:  1. 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  2. Coarse plaque is noted involving the internal carotid arteries bilaterally.      The patient's records and results personally reviewed by this provider.     Outside records reviewed from: care everywhere    LAB/DIAGNOSTIC STUDIES TODAY:      Results for HIRA HARRISON (MRN [id number]) as of 10/11/2021 13:37   Ref. Range 10/11/2021 12:48   Sodium Latest Ref Range: 133 - 144 mmol/L 143   Potassium Latest Ref Range: 3.4 - 5.3 mmol/L 4.1   Chloride Latest Ref Range: 94 - 109 mmol/L 108   Carbon Dioxide Latest Ref Range: 20 - 32 mmol/L 28   Urea Nitrogen Latest Ref Range: 7 - 30 mg/dL 12   Creatinine Latest Ref Range: 0.66 - 1.25 mg/dL 0.89   GFR Estimate Latest Ref Range: >60 mL/min/1.73m2 81   Calcium Latest Ref Range: 8.5 - 10.1 mg/dL 9.3   Anion Gap Latest Ref Range: 3 - 14 mmol/L 7   N-Terminal Pro Bnp Latest Ref Range: 0 - 450 pg/mL 71   Glucose Latest Ref Range: 70 - 99 mg/dL 104 (H)   WBC Latest Ref Range: 4.0 - 11.0 10e3/uL 9.8   Hemoglobin Latest Ref Range: 13.3 - 17.7 g/dL 13.7   Hematocrit Latest Ref Range: 40.0 - 53.0 % 43.3   Platelet Count Latest Ref Range: 150 - 450 10e3/uL 244   RBC Count Latest Ref Range: 4.40 - 5.90 10e6/uL 4.67   MCV Latest Ref Range: 78 - 100 fL 93   MCH Latest Ref Range: 26.5 - 33.0 pg  29.3   [facility] Latest Ref Range: 31.5 - 36.5 g/dL 31.6   RDW Latest Ref Range: 10.0 - 15.0 % 14.0           ASSESSMENT and PLAN    Guevara Jones is a 81 year old male who is scheduled for Open laminectomies lumbar 2-4 (2 levels). on 11/1/21 by Dr. Manriquez in treatment of Spinal stenosis of lumbar region with neurogenic claudication.  PAC referral for risk assessment and optimization for anesthesia with comorbid conditions of HTN, HLD, History of CVA, history of concussion, ataxia, BPH, osteoarthritis:    Pre-operative considerations:  1.  Cardiac:  Functional status- METS 3, the patient is able to walk 1 block but then has to stop due to intense pain from his back. He denies chest tightness or pain. He does note that he feels he has some shortness of breath while sitting but denies any with exercise. He had an echo on 7/7/20 with normal EF 60.1%, grade I diastolic dysfunction and no wall motion. He has not had a change in his functional status since this testing. EKG was completed today as no prior record of EKG and this showed normal sinus rhythm with inferior infarct age undetermined. Discussed with Dr. Stephens and will get BNP and unless greatly elevated no further testing indicated given his previous testing and no changes.  Intermediate risk surgery with 0.9% (RCRI #) risk of major adverse cardiac event.   ~ HTN/ HLD - Will hold ASA 325mg for 7 days prior    2.  Pulm:  Airway feasible.  DIANDRA risk: Intermediate (male, age, HTN)  ~ Former smoker - remote and quit in 1970.     3. Neuro: History of CVA 7/2020 - seen on imaging only. The patient never had any symptoms.   ~ History of concussion in 7/2020.   ~ Ataxia - the patient uses a cane. Fall precautions indicated.     4. GI:  Risk of PONV score = 2.  If > 2, anti-emetic intervention recommended.  ~ GERD - patient uses TUMS PRN for intermittent food related symptoms.     5. : BPH - continue flomax. He denies UTI symptoms.     6. Musculoskeletal:  osteoarthritis - s/p right TSA  ~ Spinal stenosis with neurogenic claudication - procedure as above.     7. Heme: The patient is a Druze. Will check CBC with iron as no prior labs to review. He is open to cell saver if needed.     VTE risk: 1.8%        Patient was discussed with Dr Stephens.       The patient is optimized and acceptable candidate for proposed procedure. Arrival time, NPO, shower and medication instructions provided by nursing staff today.      On the day of service:     Prep time: 13 minutes  Visit time: 15 minutes  Documentation time: 20 minutes  ------------------------------------------  Total time: 48 minutes      Meenakshi Davies PA-C  Preoperative Assessment Center  [facility]  [facility]  Phone: [phone]  Fax: [phone]

## 2021-10-11 NOTE — PATIENT INSTRUCTIONS
Preparing for Your Surgery      Name:  Guevara Jones   MRN:  [id number]   :  1940   Today's Date:  10/11/2021       Arriving for surgery:  Surgery date:  21  Arrival time:  10:15 am    Restrictions due to COVID 19:       One visitor is allowed in the Pre Op area.       When you go into surgery, one visitor is allowed to wait in the Surgery Waiting Room       (provided there is enough space to social distance).         In hospital patients are allowed 1 visitor per day       The visitor may change daily     Visiting Hours: 8 am - 8:30 pm   No ill visitors.   All visitors must wear face mask.    [employer] parking is available for anyone with mobility limitations or disabilities.  ([facility]  24 hours/ 7 days a week; [facility]  7 am- 3:30 pm, Mon- Fri)    Please come to:     [facility] [address]  [address] [place]  [address]    -[employer] parking is available in front of [facility] from 5:15AM to 8:00PM. If you prefer, park your car in the Green Lot.    -Proceed to the 3rd floor, check in at the Adult Surgery Waiting Lounge. [phone]    If an escort is needed stop at the Information Desk in the lobby. Inform the information person that you are here for surgery. An escort to the Adult Surgery Waiting Lounge will be provided.        What can I eat or drink?  -  You may eat and drink normally for up to 8 hours before your surgery. (Until 4:45 am)  -  You may have clear liquids until 2 hours before surgery. (Until 10:15 am)    Examples of clear liquids:  Water  Clear broth  Juices (apple, white grape, white cranberry  and cider) without pulp  Noncarbonated, powder based beverages  (lemonade and Vicente-Aid)  Sodas (Sprite, 7-Up, ginger ale and seltzer)  Coffee or tea (without milk or cream)  Gatorade    -  No Alcohol for at least 24 hours before surgery     Which medicines can I take?    **Hold Aspirin for 7 days before surgery -  take your last dose on 10/24/21.**     Hold Multivitamins for 7 days before surgery.  Hold Supplements for 7 days before surgery.    **Hold Ibuprofen (Advil, Motrin) for 1 day before surgery--unless otherwise directed by surgeon.**    **Hold Naproxen (Aleve) for 4 days before surgery - take your last dose on 10/27/21.**    -  PLEASE TAKE these medications the day of surgery:  Gabapentin (Neurontin)    How do I prepare myself?  - Please take 2 showers before surgery using Scrubcare or Hibiclens soap.    Use this soap only from the neck to your toes.     Leave the soap on your skin for one minute--then rinse thoroughly.      You may use your own shampoo and conditioner; no other hair products.   - Please remove all jewelry and body piercings.  - No lotions, deodorants or fragrance.  - Bring your ID and insurance card.    -If you have a Deep Brain Stimulator, Spinal Cord Stimulator or any neuro stimulator device---you must bring the remote control to the hospital     - All patients are required to have a Covid-19 test within 4 days of surgery/procedure.      -Patients will be contacted by the [facility] scheduling team within 1 week of surgery to make an appointment.      - Patients may call the Scheduling team at [phone] if they have not been scheduled within 4 days of  surgery.      Questions or Concerns:    - For any questions regarding the day of surgery or your hospital stay, please contact the Pre Admission Nursing Office at [phone].       - If you have health changes between today and your surgery please call your surgeon.       For questions after surgery please call your surgeons office.

## 2021-10-12 LAB
ATRIAL RATE - MUSE: 66 BPM
DIASTOLIC BLOOD PRESSURE - MUSE: NORMAL MMHG
INTERPRETATION ECG - MUSE: NORMAL
P AXIS - MUSE: -10 DEGREES
PR INTERVAL - MUSE: 146 MS
QRS DURATION - MUSE: 100 MS
QT - MUSE: 386 MS
QTC - MUSE: 404 MS
R AXIS - MUSE: -25 DEGREES
SYSTOLIC BLOOD PRESSURE - MUSE: NORMAL MMHG
T AXIS - MUSE: 149 DEGREES
VENTRICULAR RATE- MUSE: 66 BPM

## 2021-10-19 ENCOUNTER — TELEPHONE (OUTPATIENT)
Dept: ORTHOPEDICS | Facility: CLINIC | Age: 81
End: 2021-10-19

## 2021-10-19 NOTE — TELEPHONE ENCOUNTER
[facility] Call Center    Phone Message    May a detailed message be left on voicemail: yes     Reason for Call: Order(s): Other:   Reason for requested: Covid Test Order  Date needed: today  Provider name: Dr. Manriquez      Please fax order to [facility] in [place], [place].    Fax# [phone]    Patient would like Ebtty to call him after order has been faxed.      Action Taken: Message routed to:  [facility] ([facility]): ortho    Travel Screening: Not Applicable

## 2021-10-19 NOTE — TELEPHONE ENCOUNTER
Covid test order faxed to [facility] in [place]. Called pt and notified him. He verified understanding.           -Devin ATC- Orthopedics

## 2021-10-19 NOTE — TELEPHONE ENCOUNTER
See phone message from surgery scheduler.    I called pt back on 10-19-21 & again reviewed preop teaching & pkt. & answered all questions.  Call back prn. Pt agreed.  Betty Keller RN.

## 2021-10-20 NOTE — TELEPHONE ENCOUNTER
[facility] Call Center    Phone Message    May a detailed message be left on voicemail: yes     Reason for Call: Other: patient would like to relay that his COVID test is scheduled for 10/29      Action Taken: Message routed to:  [facility] ([facility]): ortho    Travel Screening: Not Applicable      No call back needed just confirming test is scheduled

## 2021-10-22 ENCOUNTER — TELEPHONE (OUTPATIENT)
Dept: ORTHOPEDICS | Facility: CLINIC | Age: 81
End: 2021-10-22

## 2021-10-22 NOTE — TELEPHONE ENCOUNTER
Called patient back and let him know that he should have the vaccine no less than 2 weeks before or after surgery. Patient agreeable.

## 2021-10-22 NOTE — TELEPHONE ENCOUNTER
Health Call Center    Phone Message    May a detailed message be left on voicemail: yes     Reason for Call Patient is up for His J &J Booster . He want to know should He take it now or wait till after Surgery. Please call Patient   Action Taken: Message routed to:  [facility] ([facility] yolanda    Travel Screening: Not Applicable

## 2021-10-29 ENCOUNTER — TELEPHONE (OUTPATIENT)
Dept: SURGERY | Facility: CLINIC | Age: 81
End: 2021-10-29

## 2021-10-29 NOTE — TELEPHONE ENCOUNTER
I spoke with this patient, he was concerned that his Covid test which was done this morning wouldn't be back in time for his surgery on 11/1/21, I reassured him it most likely will be done in time. He stated understanding and had no further questions at this time.     JOY Collado LPN

## 2021-10-31 ENCOUNTER — ANESTHESIA EVENT (OUTPATIENT)
Dept: SURGERY | Facility: CLINIC | Age: 81
End: 2021-10-31
Payer: COMMERCIAL

## 2021-11-01 ENCOUNTER — APPOINTMENT (OUTPATIENT)
Dept: GENERAL RADIOLOGY | Facility: CLINIC | Age: 81
End: 2021-11-01
Attending: ORTHOPAEDIC SURGERY
Payer: COMMERCIAL

## 2021-11-01 ENCOUNTER — ANESTHESIA (OUTPATIENT)
Dept: SURGERY | Facility: CLINIC | Age: 81
End: 2021-11-01
Payer: COMMERCIAL

## 2021-11-01 ENCOUNTER — HOSPITAL ENCOUNTER (OUTPATIENT)
Facility: CLINIC | Age: 81
Discharge: NURSING, REHAB OR LONG-TERM CARE | End: 2021-11-05
Attending: ORTHOPAEDIC SURGERY | Admitting: ORTHOPAEDIC SURGERY
Payer: COMMERCIAL

## 2021-11-01 DIAGNOSIS — M43.10 DEGENERATIVE SPONDYLOLISTHESIS: ICD-10-CM

## 2021-11-01 DIAGNOSIS — M48.062 SPINAL STENOSIS OF LUMBAR REGION WITH NEUROGENIC CLAUDICATION: ICD-10-CM

## 2021-11-01 LAB
ABO/RH(D): NORMAL
ANTIBODY SCREEN: NEGATIVE
GLUCOSE BLDC GLUCOMTR-MCNC: 109 MG/DL (ref 70–99)
SPECIMEN EXPIRATION DATE: NORMAL

## 2021-11-01 PROCEDURE — 999N000063 XR CROSSTABLE LATERAL LUMBAR SPINE PORTABLE

## 2021-11-01 PROCEDURE — 86850 RBC ANTIBODY SCREEN: CPT | Performed by: PHYSICIAN ASSISTANT

## 2021-11-01 PROCEDURE — 63047 LAM FACETEC & FORAMOT LUMBAR: CPT | Performed by: ORTHOPAEDIC SURGERY

## 2021-11-01 PROCEDURE — 250N000013 HC RX MED GY IP 250 OP 250 PS 637: Performed by: PHYSICIAN ASSISTANT

## 2021-11-01 PROCEDURE — 258N000003 HC RX IP 258 OP 636: Performed by: PHYSICIAN ASSISTANT

## 2021-11-01 PROCEDURE — 250N000011 HC RX IP 250 OP 636: Performed by: NURSE ANESTHETIST, CERTIFIED REGISTERED

## 2021-11-01 PROCEDURE — 272N000001 HC OR GENERAL SUPPLY STERILE: Performed by: ORTHOPAEDIC SURGERY

## 2021-11-01 PROCEDURE — 36415 COLL VENOUS BLD VENIPUNCTURE: CPT | Performed by: PHYSICIAN ASSISTANT

## 2021-11-01 PROCEDURE — 82962 GLUCOSE BLOOD TEST: CPT

## 2021-11-01 PROCEDURE — 999N000141 HC STATISTIC PRE-PROCEDURE NURSING ASSESSMENT: Performed by: ORTHOPAEDIC SURGERY

## 2021-11-01 PROCEDURE — 63048 LAM FACETEC &FORAMOT EA ADDL: CPT | Performed by: ORTHOPAEDIC SURGERY

## 2021-11-01 PROCEDURE — 250N000009 HC RX 250: Performed by: NURSE ANESTHETIST, CERTIFIED REGISTERED

## 2021-11-01 PROCEDURE — 250N000013 HC RX MED GY IP 250 OP 250 PS 637: Performed by: ANESTHESIOLOGY

## 2021-11-01 PROCEDURE — 250N000011 HC RX IP 250 OP 636: Performed by: ANESTHESIOLOGY

## 2021-11-01 PROCEDURE — 258N000003 HC RX IP 258 OP 636: Performed by: NURSE ANESTHETIST, CERTIFIED REGISTERED

## 2021-11-01 PROCEDURE — 370N000017 HC ANESTHESIA TECHNICAL FEE, PER MIN: Performed by: ORTHOPAEDIC SURGERY

## 2021-11-01 PROCEDURE — 710N000010 HC RECOVERY PHASE 1, LEVEL 2, PER MIN: Performed by: ORTHOPAEDIC SURGERY

## 2021-11-01 PROCEDURE — 272N000004 HC RX 272: Performed by: ORTHOPAEDIC SURGERY

## 2021-11-01 PROCEDURE — 99207 PR CDG-CODE CATEGORY CHANGED: CPT | Performed by: INTERNAL MEDICINE

## 2021-11-01 PROCEDURE — 99203 OFFICE O/P NEW LOW 30 MIN: CPT | Performed by: INTERNAL MEDICINE

## 2021-11-01 PROCEDURE — 72020 X-RAY EXAM OF SPINE 1 VIEW: CPT | Mod: 26 | Performed by: STUDENT IN AN ORGANIZED HEALTH CARE EDUCATION/TRAINING PROGRAM

## 2021-11-01 PROCEDURE — 250N000013 HC RX MED GY IP 250 OP 250 PS 637: Performed by: INTERNAL MEDICINE

## 2021-11-01 PROCEDURE — 250N000011 HC RX IP 250 OP 636: Performed by: ORTHOPAEDIC SURGERY

## 2021-11-01 PROCEDURE — 250N000009 HC RX 250: Performed by: PHYSICIAN ASSISTANT

## 2021-11-01 PROCEDURE — 250N000011 HC RX IP 250 OP 636: Performed by: PHYSICIAN ASSISTANT

## 2021-11-01 PROCEDURE — 250N000025 HC SEVOFLURANE, PER MIN: Performed by: ORTHOPAEDIC SURGERY

## 2021-11-01 PROCEDURE — 360N000084 HC SURGERY LEVEL 4 W/ FLUORO, PER MIN: Performed by: ORTHOPAEDIC SURGERY

## 2021-11-01 PROCEDURE — 86901 BLOOD TYPING SEROLOGIC RH(D): CPT | Performed by: PHYSICIAN ASSISTANT

## 2021-11-01 RX ORDER — FENTANYL CITRATE 50 UG/ML
INJECTION, SOLUTION INTRAMUSCULAR; INTRAVENOUS PRN
Status: DISCONTINUED | OUTPATIENT
Start: 2021-11-01 | End: 2021-11-01

## 2021-11-01 RX ORDER — ONDANSETRON 2 MG/ML
4 INJECTION INTRAMUSCULAR; INTRAVENOUS EVERY 30 MIN PRN
Status: DISCONTINUED | OUTPATIENT
Start: 2021-11-01 | End: 2021-11-01 | Stop reason: HOSPADM

## 2021-11-01 RX ORDER — OXYCODONE HYDROCHLORIDE 5 MG/1
5 TABLET ORAL EVERY 4 HOURS PRN
Status: DISCONTINUED | OUTPATIENT
Start: 2021-11-01 | End: 2021-11-01 | Stop reason: HOSPADM

## 2021-11-01 RX ORDER — PROPOFOL 10 MG/ML
INJECTION, EMULSION INTRAVENOUS PRN
Status: DISCONTINUED | OUTPATIENT
Start: 2021-11-01 | End: 2021-11-01

## 2021-11-01 RX ORDER — NALOXONE HYDROCHLORIDE 0.4 MG/ML
0.2 INJECTION, SOLUTION INTRAMUSCULAR; INTRAVENOUS; SUBCUTANEOUS
Status: DISCONTINUED | OUTPATIENT
Start: 2021-11-01 | End: 2021-11-05 | Stop reason: HOSPADM

## 2021-11-01 RX ORDER — HYDRALAZINE HYDROCHLORIDE 20 MG/ML
10 INJECTION INTRAMUSCULAR; INTRAVENOUS EVERY 6 HOURS PRN
Status: DISCONTINUED | OUTPATIENT
Start: 2021-11-01 | End: 2021-11-05 | Stop reason: HOSPADM

## 2021-11-01 RX ORDER — ONDANSETRON 2 MG/ML
4 INJECTION INTRAMUSCULAR; INTRAVENOUS EVERY 6 HOURS PRN
Status: DISCONTINUED | OUTPATIENT
Start: 2021-11-01 | End: 2021-11-05 | Stop reason: HOSPADM

## 2021-11-01 RX ORDER — OXYCODONE HYDROCHLORIDE 10 MG/1
10 TABLET ORAL EVERY 4 HOURS PRN
Status: DISCONTINUED | OUTPATIENT
Start: 2021-11-01 | End: 2021-11-05 | Stop reason: HOSPADM

## 2021-11-01 RX ORDER — TAMSULOSIN HYDROCHLORIDE 0.4 MG/1
0.4 CAPSULE ORAL EVERY EVENING
Status: DISCONTINUED | OUTPATIENT
Start: 2021-11-01 | End: 2021-11-05 | Stop reason: HOSPADM

## 2021-11-01 RX ORDER — ONDANSETRON 2 MG/ML
INJECTION INTRAMUSCULAR; INTRAVENOUS PRN
Status: DISCONTINUED | OUTPATIENT
Start: 2021-11-01 | End: 2021-11-01

## 2021-11-01 RX ORDER — SODIUM CHLORIDE, SODIUM LACTATE, POTASSIUM CHLORIDE, CALCIUM CHLORIDE 600; 310; 30; 20 MG/100ML; MG/100ML; MG/100ML; MG/100ML
INJECTION, SOLUTION INTRAVENOUS CONTINUOUS PRN
Status: DISCONTINUED | OUTPATIENT
Start: 2021-11-01 | End: 2021-11-01

## 2021-11-01 RX ORDER — LIDOCAINE HYDROCHLORIDE 20 MG/ML
INJECTION, SOLUTION INFILTRATION; PERINEURAL PRN
Status: DISCONTINUED | OUTPATIENT
Start: 2021-11-01 | End: 2021-11-01

## 2021-11-01 RX ORDER — LIDOCAINE 40 MG/G
CREAM TOPICAL
Status: DISCONTINUED | OUTPATIENT
Start: 2021-11-01 | End: 2021-11-05 | Stop reason: HOSPADM

## 2021-11-01 RX ORDER — BISACODYL 10 MG
10 SUPPOSITORY, RECTAL RECTAL DAILY PRN
Status: DISCONTINUED | OUTPATIENT
Start: 2021-11-01 | End: 2021-11-05 | Stop reason: HOSPADM

## 2021-11-01 RX ORDER — ACETAMINOPHEN 325 MG/1
650 TABLET ORAL EVERY 4 HOURS PRN
Status: DISCONTINUED | OUTPATIENT
Start: 2021-11-04 | End: 2021-11-05 | Stop reason: HOSPADM

## 2021-11-01 RX ORDER — AMOXICILLIN 250 MG
1 CAPSULE ORAL 2 TIMES DAILY
Status: DISCONTINUED | OUTPATIENT
Start: 2021-11-01 | End: 2021-11-05 | Stop reason: HOSPADM

## 2021-11-01 RX ORDER — AMLODIPINE BESYLATE 2.5 MG/1
2.5 TABLET ORAL DAILY
Status: DISCONTINUED | OUTPATIENT
Start: 2021-11-01 | End: 2021-11-02

## 2021-11-01 RX ORDER — ACETAMINOPHEN 325 MG/1
975 TABLET ORAL EVERY 8 HOURS
Status: COMPLETED | OUTPATIENT
Start: 2021-11-01 | End: 2021-11-04

## 2021-11-01 RX ORDER — CEFAZOLIN SODIUM 2 G/100ML
2 INJECTION, SOLUTION INTRAVENOUS SEE ADMIN INSTRUCTIONS
Status: DISCONTINUED | OUTPATIENT
Start: 2021-11-01 | End: 2021-11-01 | Stop reason: HOSPADM

## 2021-11-01 RX ORDER — OXYCODONE HYDROCHLORIDE 5 MG/1
5 TABLET ORAL EVERY 4 HOURS PRN
Status: DISCONTINUED | OUTPATIENT
Start: 2021-11-01 | End: 2021-11-05 | Stop reason: HOSPADM

## 2021-11-01 RX ORDER — METHOCARBAMOL 500 MG/1
500 TABLET, FILM COATED ORAL EVERY 6 HOURS PRN
Status: DISCONTINUED | OUTPATIENT
Start: 2021-11-01 | End: 2021-11-05 | Stop reason: HOSPADM

## 2021-11-01 RX ORDER — DEXAMETHASONE SODIUM PHOSPHATE 4 MG/ML
INJECTION, SOLUTION INTRA-ARTICULAR; INTRALESIONAL; INTRAMUSCULAR; INTRAVENOUS; SOFT TISSUE PRN
Status: DISCONTINUED | OUTPATIENT
Start: 2021-11-01 | End: 2021-11-01

## 2021-11-01 RX ORDER — CEFAZOLIN SODIUM 2 G/100ML
2 INJECTION, SOLUTION INTRAVENOUS
Status: COMPLETED | OUTPATIENT
Start: 2021-11-01 | End: 2021-11-01

## 2021-11-01 RX ORDER — VANCOMYCIN HYDROCHLORIDE 1 G/20ML
INJECTION, POWDER, LYOPHILIZED, FOR SOLUTION INTRAVENOUS PRN
Status: DISCONTINUED | OUTPATIENT
Start: 2021-11-01 | End: 2021-11-01 | Stop reason: HOSPADM

## 2021-11-01 RX ORDER — HYDROXYZINE HYDROCHLORIDE 10 MG/1
10 TABLET, FILM COATED ORAL EVERY 6 HOURS PRN
Status: DISCONTINUED | OUTPATIENT
Start: 2021-11-01 | End: 2021-11-05 | Stop reason: HOSPADM

## 2021-11-01 RX ORDER — ONDANSETRON 4 MG/1
4 TABLET, ORALLY DISINTEGRATING ORAL EVERY 30 MIN PRN
Status: DISCONTINUED | OUTPATIENT
Start: 2021-11-01 | End: 2021-11-01 | Stop reason: HOSPADM

## 2021-11-01 RX ORDER — SODIUM CHLORIDE, SODIUM GLUCONATE, SODIUM ACETATE, POTASSIUM CHLORIDE AND MAGNESIUM CHLORIDE 526; 502; 368; 37; 30 MG/100ML; MG/100ML; MG/100ML; MG/100ML; MG/100ML
INJECTION, SOLUTION INTRAVENOUS CONTINUOUS PRN
Status: DISCONTINUED | OUTPATIENT
Start: 2021-11-01 | End: 2021-11-01

## 2021-11-01 RX ORDER — FAMOTIDINE 20 MG/1
20 TABLET, FILM COATED ORAL 2 TIMES DAILY
Status: DISCONTINUED | OUTPATIENT
Start: 2021-11-01 | End: 2021-11-05 | Stop reason: HOSPADM

## 2021-11-01 RX ORDER — ACETAMINOPHEN 325 MG/1
975 TABLET ORAL ONCE
Status: COMPLETED | OUTPATIENT
Start: 2021-11-01 | End: 2021-11-01

## 2021-11-01 RX ORDER — HYDROMORPHONE HYDROCHLORIDE 1 MG/ML
0.2 INJECTION, SOLUTION INTRAMUSCULAR; INTRAVENOUS; SUBCUTANEOUS EVERY 5 MIN PRN
Status: DISCONTINUED | OUTPATIENT
Start: 2021-11-01 | End: 2021-11-01 | Stop reason: HOSPADM

## 2021-11-01 RX ORDER — GABAPENTIN 600 MG/1
600 TABLET ORAL 2 TIMES DAILY
Status: DISCONTINUED | OUTPATIENT
Start: 2021-11-01 | End: 2021-11-05 | Stop reason: HOSPADM

## 2021-11-01 RX ORDER — LANOLIN ALCOHOL/MO/W.PET/CERES
3 CREAM (GRAM) TOPICAL
Status: DISCONTINUED | OUTPATIENT
Start: 2021-11-01 | End: 2021-11-05 | Stop reason: HOSPADM

## 2021-11-01 RX ORDER — SODIUM CHLORIDE, SODIUM LACTATE, POTASSIUM CHLORIDE, CALCIUM CHLORIDE 600; 310; 30; 20 MG/100ML; MG/100ML; MG/100ML; MG/100ML
INJECTION, SOLUTION INTRAVENOUS CONTINUOUS
Status: DISCONTINUED | OUTPATIENT
Start: 2021-11-01 | End: 2021-11-01 | Stop reason: HOSPADM

## 2021-11-01 RX ORDER — NALOXONE HYDROCHLORIDE 0.4 MG/ML
0.4 INJECTION, SOLUTION INTRAMUSCULAR; INTRAVENOUS; SUBCUTANEOUS
Status: DISCONTINUED | OUTPATIENT
Start: 2021-11-01 | End: 2021-11-05 | Stop reason: HOSPADM

## 2021-11-01 RX ORDER — POLYETHYLENE GLYCOL 3350 17 G/17G
17 POWDER, FOR SOLUTION ORAL DAILY
Status: DISCONTINUED | OUTPATIENT
Start: 2021-11-02 | End: 2021-11-05 | Stop reason: HOSPADM

## 2021-11-01 RX ORDER — ONDANSETRON 4 MG/1
4 TABLET, ORALLY DISINTEGRATING ORAL EVERY 6 HOURS PRN
Status: DISCONTINUED | OUTPATIENT
Start: 2021-11-01 | End: 2021-11-05 | Stop reason: HOSPADM

## 2021-11-01 RX ORDER — FENTANYL CITRATE 50 UG/ML
25 INJECTION, SOLUTION INTRAMUSCULAR; INTRAVENOUS EVERY 5 MIN PRN
Status: DISCONTINUED | OUTPATIENT
Start: 2021-11-01 | End: 2021-11-01 | Stop reason: HOSPADM

## 2021-11-01 RX ADMIN — DOCUSATE SODIUM AND SENNOSIDES 1 TABLET: 8.6; 5 TABLET ORAL at 21:22

## 2021-11-01 RX ADMIN — HYDROMORPHONE HYDROCHLORIDE 0.2 MG: 1 INJECTION, SOLUTION INTRAMUSCULAR; INTRAVENOUS; SUBCUTANEOUS at 18:00

## 2021-11-01 RX ADMIN — PHENYLEPHRINE HYDROCHLORIDE 100 MCG: 10 INJECTION INTRAVENOUS at 15:10

## 2021-11-01 RX ADMIN — AMLODIPINE BESYLATE 2.5 MG: 2.5 TABLET ORAL at 21:22

## 2021-11-01 RX ADMIN — ONDANSETRON 4 MG: 2 INJECTION INTRAMUSCULAR; INTRAVENOUS at 16:29

## 2021-11-01 RX ADMIN — ACETAMINOPHEN 650 MG: 325 TABLET, FILM COATED ORAL at 11:29

## 2021-11-01 RX ADMIN — FAMOTIDINE 20 MG: 20 TABLET ORAL at 21:23

## 2021-11-01 RX ADMIN — HYDROXYZINE HYDROCHLORIDE 10 MG: 10 TABLET ORAL at 21:22

## 2021-11-01 RX ADMIN — SUGAMMADEX 200 MG: 100 INJECTION, SOLUTION INTRAVENOUS at 16:58

## 2021-11-01 RX ADMIN — HYDROMORPHONE HYDROCHLORIDE 0.2 MG: 1 INJECTION, SOLUTION INTRAMUSCULAR; INTRAVENOUS; SUBCUTANEOUS at 16:04

## 2021-11-01 RX ADMIN — PROPOFOL 30 MG: 10 INJECTION, EMULSION INTRAVENOUS at 16:41

## 2021-11-01 RX ADMIN — CEFAZOLIN SODIUM 2 G: 2 INJECTION, SOLUTION INTRAVENOUS at 14:13

## 2021-11-01 RX ADMIN — ROCURONIUM BROMIDE 50 MG: 50 INJECTION, SOLUTION INTRAVENOUS at 14:05

## 2021-11-01 RX ADMIN — FENTANYL CITRATE 25 MCG: 50 INJECTION, SOLUTION INTRAMUSCULAR; INTRAVENOUS at 17:32

## 2021-11-01 RX ADMIN — FENTANYL CITRATE 25 MCG: 50 INJECTION, SOLUTION INTRAMUSCULAR; INTRAVENOUS at 17:39

## 2021-11-01 RX ADMIN — ACETAMINOPHEN 975 MG: 325 TABLET, FILM COATED ORAL at 19:12

## 2021-11-01 RX ADMIN — ROCURONIUM BROMIDE 20 MG: 50 INJECTION, SOLUTION INTRAVENOUS at 15:35

## 2021-11-01 RX ADMIN — SODIUM CHLORIDE, POTASSIUM CHLORIDE, SODIUM LACTATE AND CALCIUM CHLORIDE: 600; 310; 30; 20 INJECTION, SOLUTION INTRAVENOUS at 13:42

## 2021-11-01 RX ADMIN — DEXAMETHASONE SODIUM PHOSPHATE 8 MG: 4 INJECTION, SOLUTION INTRAMUSCULAR; INTRAVENOUS at 14:05

## 2021-11-01 RX ADMIN — PHENYLEPHRINE HYDROCHLORIDE 100 MCG: 10 INJECTION INTRAVENOUS at 15:06

## 2021-11-01 RX ADMIN — PROPOFOL 130 MG: 10 INJECTION, EMULSION INTRAVENOUS at 14:04

## 2021-11-01 RX ADMIN — FENTANYL CITRATE 100 MCG: 50 INJECTION, SOLUTION INTRAMUSCULAR; INTRAVENOUS at 14:02

## 2021-11-01 RX ADMIN — FENTANYL CITRATE 25 MCG: 50 INJECTION, SOLUTION INTRAMUSCULAR; INTRAVENOUS at 17:46

## 2021-11-01 RX ADMIN — FENTANYL CITRATE 25 MCG: 50 INJECTION, SOLUTION INTRAMUSCULAR; INTRAVENOUS at 17:27

## 2021-11-01 RX ADMIN — TAMSULOSIN HYDROCHLORIDE 0.4 MG: 0.4 CAPSULE ORAL at 21:22

## 2021-11-01 RX ADMIN — SODIUM CHLORIDE, SODIUM GLUCONATE, SODIUM ACETATE, POTASSIUM CHLORIDE AND MAGNESIUM CHLORIDE: 526; 502; 368; 37; 30 INJECTION, SOLUTION INTRAVENOUS at 14:45

## 2021-11-01 RX ADMIN — OXYCODONE HYDROCHLORIDE 5 MG: 5 TABLET ORAL at 18:49

## 2021-11-01 RX ADMIN — PHENYLEPHRINE HYDROCHLORIDE 100 MCG: 10 INJECTION INTRAVENOUS at 16:49

## 2021-11-01 RX ADMIN — LIDOCAINE HYDROCHLORIDE 100 MG: 20 INJECTION, SOLUTION INFILTRATION; PERINEURAL at 14:02

## 2021-11-01 RX ADMIN — HYDROMORPHONE HYDROCHLORIDE 0.3 MG: 1 INJECTION, SOLUTION INTRAMUSCULAR; INTRAVENOUS; SUBCUTANEOUS at 16:35

## 2021-11-01 RX ADMIN — TRANEXAMIC ACID 1 G: 100 INJECTION, SOLUTION INTRAVENOUS at 14:39

## 2021-11-01 RX ADMIN — HYDROMORPHONE HYDROCHLORIDE 0.2 MG: 1 INJECTION, SOLUTION INTRAMUSCULAR; INTRAVENOUS; SUBCUTANEOUS at 18:11

## 2021-11-01 RX ADMIN — GABAPENTIN 600 MG: 600 TABLET, FILM COATED ORAL at 21:22

## 2021-11-01 RX ADMIN — MINERAL OIL AND PETROLATUM 1 INCH: 150; 830 OINTMENT OPHTHALMIC at 14:11

## 2021-11-01 RX ADMIN — PHENYLEPHRINE HYDROCHLORIDE 0.2 MCG/KG/MIN: 10 INJECTION INTRAVENOUS at 15:07

## 2021-11-01 RX ADMIN — HYDROMORPHONE HYDROCHLORIDE 0.2 MG: 1 INJECTION, SOLUTION INTRAMUSCULAR; INTRAVENOUS; SUBCUTANEOUS at 18:23

## 2021-11-01 ASSESSMENT — MIFFLIN-ST. JEOR: SCORE: 1621.63

## 2021-11-01 ASSESSMENT — LIFESTYLE VARIABLES: TOBACCO_USE: 1

## 2021-11-01 ASSESSMENT — ENCOUNTER SYMPTOMS: SEIZURES: 0

## 2021-11-01 NOTE — LETTER
Recipient: [facility]- admissions          Sender: Cara Wiseman [phone]          Date: November 4, 2021  Patient Name:  Guevara Jones  Routing Message:  TCU referral        The documents accompanying this notice contain confidential information belonging to the sender.  This information is intended only for the use of the individual or entity named above.  The authorized recipient of this information is prohibited from disclosing this information to any other party and is required to destroy the information after its stated need has been fulfilled, unless otherwise required by state law.    If you are not the intended recipient, you are hereby notified that any disclosure, copy, distribution or action taken in reliance on the contents of these documents is strictly prohibited.  If you have received this document in error, please return it by fax to [phone] with a note on the cover sheet explaining why you are returning it (e.g. not your patient, not your provider, etc.).  If you need further assistance, please call .  Documents may also be returned by mail to [employer], , [facility] [address], [place], [place] [address].

## 2021-11-01 NOTE — ANESTHESIA PROCEDURE NOTES
Airway       Patient location during procedure: OR       Procedure Start/Stop Times: 11/1/2021 2:11 PM  Staff -        Anesthesiologist:  Christophe Novoa MD       CRNA: Jessenia Hughes APRN CRNA       Performed By: CRNA, anesthesiologist and other anesthesia staff  Consent for Airway        Urgency: elective  Indications and Patient Condition       Indications for airway management: brent-procedural       Induction type:intravenous       Mask difficulty assessment: 2 - vent by mask + OA or adjuvant +/- NMBA    Final Airway Details       Final airway type: endotracheal airway       Successful airway: ETT - single and Oral  Endotracheal Airway Details        ETT size (mm): 7.5       Cuffed: yes       Successful intubation technique: video laryngoscopy       VL Blade Size: MAC 3       Grade View of Cords: 1       Adjucts: stylet       Position: Right       Measured from: gums/teeth       Secured at (cm): 22       Bite block used: Soft    Post intubation assessment        Placement verified by: capnometry, equal breath sounds and chest rise        Number of attempts at approach: 1       Secured with: silk tape and other (comment)       Ease of procedure: easy       Dentition: Intact and Unchanged (Edentulous upper)    Additional Comments       Intubated by Eh Christensen MS under direct supervision of Dr Novoa

## 2021-11-01 NOTE — OP NOTE
Date of Service:  11/1/2021      Surgeon:  Shaka Manriquez MD   Assistant:  (1) Alissa Giles PA-C.  (2) Bishop Eugene ABEL.  No resident available.  Ms. Giles and  Génesisbaldomero assisted in all parts of the procedure, including positioning, exposure, performing the surgical procedure, and closure.        Preoperative Diagnosis:     1.  Gr. 1 degenerative spondylolisthesis without gross instability L3-4.  2.  Spinal stenosis L2-3 and L3-4, milder at L4-5, with neurogenic claudication.  3.  Advanced multilevel lumbar spondylosis  4.  Lumbar and Thoracolumbar flat back deformity (LL = 23 deg, ideal 47.5; T10-L2 = 27 deg kyphosis).  5.  Refuses blood transfusion (Caodaism).      Postoperative Diagnosis:     Same      Procedures:   1.  Open decompressive laminectomies with bilateral medial facetectomies and foraminotomies L2-3 and L3-4.  2.  Repair of small dural tear, mid dorsal L3-4 level.  3.  Use of operating microscope.      Anesthesia:  General endotracheal.   Local anesthetic:  0.25% marcaine + epinephrine = 60 mL.  EBL:   100 mL.  Complications:  None apparent.   Implants / Equipment used:   1.  TXA 10 mg/kg loading dose, but no maintenance dose.  This decision was arrived at after discussion with anesthesiologist.  On 1 hand, patient is Amish and refuses blood transfusion; therefore, use of TXA may be beneficial.  On the other hand, has remote history of stroke, and use of TXA may potentially increase likelihood of another stroke.  2.  Tisseel 5 mL.      Indications:  81 year old male with chronic low back pain and bilateral neurogenic claudication.  Leg pain greater than back pain; 80% legs right greater than left, 20% back.  Imaging revealed spinal stenosis at L3-4 and L2-3, milder L4-5.  Also with stable grade 1 degenerative spondylolisthesis L3-4.  Tried nonoperative treatment, continues to have significant disabling symptoms.  I thus offered surgery in form of two-level open  laminectomies L2-3 and L3-4.  Consented after thorough discussion of the rationale, risks, benefits and alternatives.        Details:  Properly identified in preop area, site marked, informed consent signed.  Wheeled to OR.  Brief earlier performed.  General anesthesia administered.  Gaines inserted.  Antibiotic given: Cefazolin 2 g IV.  TXA loading dose given; no maintenance dose.  Positioned prone on Trios table with combo pads.  Lumbar region squared off, prepped with ChloraPrep and draped in sterile fashion. Surgical timeout performed.        Posterior midline skin incision made over approximate L2-L4 levels.  Fascia incised on either side of the spinous processes.  Bilateral subperiosteal dissection performed.  Angled curette placed at presumed L3-4 interlaminar level.  Portable lateral x-ray showed curette at correct level, verified by radiologist.  Confirmatory timeout performed.  Completed exposure from L2-L4.  Gelpi self-retaining retractors placed.  L2-3 and L3-4 interspinous ligaments removed.  L3 spinous process likewise removed using rongeur.  Operating microscope brought into the field.    Two-level laminectomies performed at L2-3 and L3-4.  Horseshoe shaped laminectomy created using niya and osteotomes, leaving the superior portion of the lamina intact.  We also made sure to preserve the L1-2 interspinous ligaments.  The central laminectomy bone was removed using rongeur.  Underlying ligamentum flavum resected using Kerrisons, exposing the dural sac.  This completed our central decompression.  Noted very tight canal at L3-4, with thin dura.  We performed bilateral medial facetectomies at both levels using 3 and 4 mm Kerrisons, carried to the medial border of the pedicles.  Again, L3-4 stenosis was worse.  We then performed bilateral foraminotomies at both levels, using 2 mm curved Kerrisons.  Adequate decompression confirmed using Yalobusha elevator and Grove probe.  Epidural bleeders controlled using  bipolar cautery, Surgi-Wayne and cottonoids.    While decompressing L3-4, we noted very mild outflow of clear fluid from a pinpoint dural tear mid dorsal.  There was no loss of dural turgor, no visible nerve rootlets.  I still, however, elected to repair this small tear, as it might propagate.  We waited until we completed the decompression at both levels.  I then used a Prolene 5-0 suture in figure of 8 stitch.  This nicely sealed off the small tear.  No fluid leak on Valsalva maneuver, thus indicating good seal.  We augmented the repair with Tisseel fibrin glue.  Microscope removed.     500 mg vancomycin powder applied deep.  Because of dural tear, we elected to place a superficial medium Hemovac drain, instead of deep.  Wound closure performed in layers using #1 Vicryl pop offs for fascia, running 0 Vicryl for deep subcutaneous, interrupted 2-0 Vicryl for superficial subcutaneous, and 3-0 Monocryl for skin.  Skin glue and sterile dressings applied.       Postop:   Outpatient overnight stay.  No need for bedrest, as there was very small and we had very good repair.  Antibiotics x 24 hours.  Mechanical DVT prophylaxis.  PT and OT consult.  Advance diet as tolerated.  No lifting more than 10 pounds, no excessive bending or twisting.  Hospitalist comanagement for medical issues.  Lumbar AP-lat standing x-rays prior to discharge.  Anticipate discharge in AM.  RTC 6 weeks with EOS full-spine AP-lat x-rays.

## 2021-11-01 NOTE — LETTER
Transition Communication Hand-off for Care Transitions to Next Level of Care Provider    Name: Guevara Jones  : 1940  MRN #: [id number]  Primary Care Provider: SHALONDA RAMAN     Primary Clinic: [facility] [address] [address]     Reason for Hospitalization:  Spinal stenosis of lumbar region with neurogenic claudication [M48.062]  Degenerative spondylolisthesis [M43.10]  Admit Date/Time: 2021  9:25 AM  Discharge Date: 21  Payor Source: Payor: MEDICA / Plan: MEDICA PRIME SOLUTION / Product Type: Indemnity /     Discharge Plan: home with home care       Discharge Needs Assessment:  Needs      Most Recent Value   Equipment Currently Used at Home  cane, straight, other (see comments), shower chair [4WW]        Follow-up plan:    Future Appointments   Date Time Provider Department Center   2021 10:45 AM Kadi Anthony, PT [facility]   2021  9:15 AM Bel Larsen PT [facility]   2021 10:40 AM Shaka Manriquez MD [facility]       Any outstanding tests or procedures:        Referrals     Future Labs/Procedures    Home care nursing referral     Comments:    [facility]  Ph: [phone]  Fx: [phone]    RN skilled nursing visit. RN to assess vital signs and weight, respiratory and cardiac status, pain level and activity tolerance, incision for signs/symptoms of infection, hydration, nutrition and bowel status and home safety.  RN to teach medication management.    PT eval and treat    Your provider has ordered home care nursing services. If you have not been contacted within 2 days of your discharge please call the inpatient department phone number at [phone] .        Documentation of Face to Face and Certification for Home Health Services    I certify that patient: Guevara Jones is under my care and that I, or a nurse practitioner or physician's assistant working with me, had a face-to-face encounter that meets the  physician face-to-face encounter requirements with this patient on: November 3, 2021.    This encounter with the patient was in whole, or in part, for the following medical condition, which is the primary reason for home health care: Guevara Jones is a 81 year old male with PMH including hx CVA, HTN, dyslipidemia, GERD, BPH now s/p L2-L4 laminectomies w/ repair of small dural tear on 11/1/21 with Dr. Manriquez.    I certify that, based on my findings, the following services are medically necessary home health services: Nursing and Physical Therapy.    My clinical findings support the need for the above services because:   RN skilled nursing visit. RN to assess vital signs and weight, respiratory and cardiac status, pain level and activity tolerance, incision for signs/symptoms of infection, hydration, nutrition and bowel status and home safety. RN to teach medication management. Physical Therapy to maximize independence and safety in ADLs/IADLs, functional mobility, and endurance      Further, I certify that my clinical findings support that this patient is homebound (i.e. absences from home require considerable and taxing effort and are for medical reasons or Buddhism services or infrequently or of short duration when for other reasons) because: Requires assistance of another person or specialized equipment to access medical services because patient: Requires supervision of another for safe transfer...    Based on the above findings. I certify that this patient is confined to the home and needs intermittent skilled nursing care, physical therapy and/or speech therapy.  The patient is under my care, and I have initiated the establishment of the plan of care.  This patient will be followed by a physician who will periodically review the plan of care.  Physician/Provider to provide follow up care: Luis Barone    Attending hospital physician (the Medicare certified [facility] provider): Shaka Manriquez  Ki*  Physician Signature: See electronic signature associated with these discharge orders.  Date: 11/3/2021  Please call to schedule your appointment              Key Recommendations:  See attached orders.     Ashley Dwyer RN    AVS/Discharge Summary is the source of truth; this is a helpful guide for improved communication of patient story

## 2021-11-01 NOTE — LETTER
Recipient: [facility]          Sender: Cara Wiseman [phone]          Date: November 5, 2021  Patient Name:  Guevara Jones  Routing Message:  Discharge orders, [id number]        The documents accompanying this notice contain confidential information belonging to the sender.  This information is intended only for the use of the individual or entity named above.  The authorized recipient of this information is prohibited from disclosing this information to any other party and is required to destroy the information after its stated need has been fulfilled, unless otherwise required by state law.    If you are not the intended recipient, you are hereby notified that any disclosure, copy, distribution or action taken in reliance on the contents of these documents is strictly prohibited.  If you have received this document in error, please return it by fax to [phone] with a note on the cover sheet explaining why you are returning it (e.g. not your patient, not your provider, etc.).  If you need further assistance, please call .  Documents may also be returned by mail to [employer], , [address], [place], [place] [address].

## 2021-11-01 NOTE — ANESTHESIA CARE TRANSFER NOTE
Patient: Guevara Jones    Procedure: Procedure(s):  Open laminectomies lumbar 2-4, Repair of Dural Tear.         Diagnosis: Spinal stenosis of lumbar region with neurogenic claudication [M48.062]  Degenerative spondylolisthesis [M43.10]  Diagnosis Additional Information: No value filed.    Anesthesia Type:   General     Note:    Oropharynx: oropharynx clear of all foreign objects and spontaneously breathing  Level of Consciousness: drowsy  Oxygen Supplementation: face mask  Level of Supplemental Oxygen (L/min / FiO2): 8  Independent Airway: airway patency satisfactory and stable  Dentition: dentition unchanged  Vital Signs Stable: post-procedure vital signs reviewed and stable  Report to RN Given: handoff report given  Patient transferred to: PACU    Handoff Report: Identifed the Patient, Identified the Reponsible Provider, Reviewed the pertinent medical history, Discussed the surgical course, Reviewed Intra-OP anesthesia mangement and issues during anesthesia, Set expectations for post-procedure period and Allowed opportunity for questions and acknowledgement of understanding      Vitals:  Vitals Value Taken Time   /83 11/01/21 1745   Temp     Pulse 92 11/01/21 1753   Resp 7 11/01/21 1753   SpO2 97 % 11/01/21 1753   Vitals shown include unvalidated device data.    Electronically Signed By: JAMES Cage CRNA  November 1, 2021  5:54 PM

## 2021-11-01 NOTE — LETTER
FROM [facility]  [phone]    Name: Guevara Jones  : 1940    S/p   Discharge tomorrow   Needs PT  We will send signed orders day of discharge    Please confirm you can accept - thanks!

## 2021-11-01 NOTE — ANESTHESIA PREPROCEDURE EVALUATION
Anesthesia Pre-Procedure Evaluation    Patient: Guevara Jones   MRN: [id number] : 1940        Preoperative Diagnosis: * No surgery found *    Procedure :   PAC EVALUATION       Past Medical History:   Diagnosis Date     Ataxia      BPH (benign prostatic hyperplasia)      History of concussion      History of CVA (cerebrovascular accident)      HLD (hyperlipidemia)      HTN (hypertension)      Osteoarthritis      Spinal stenosis of lumbar region with neurogenic claudication       Past Surgical History:   Procedure Laterality Date     APPENDECTOMY       TOTAL SHOULDER ARTHROPLASTY Right       Allergies   Allergen Reactions     Latex      Lentil       Social History     Tobacco Use     Smoking status: Former Smoker     Quit date:      Years since quittin.8     Smokeless tobacco: Never Used   Substance Use Topics     Alcohol use: Not on file      Wt Readings from Last 1 Encounters:   21 92.1 kg (203 lb 0.7 oz)        Anesthesia Evaluation   Pt has had prior anesthetic. Type: General.    No history of anesthetic complications       ROS/MED HX  ENT/Pulmonary:  - neg pulmonary ROS   (+) tobacco use (quit in ), Past use,     Neurologic: Comment: History of concussion    (+) CVA (seen on imaging on 2020. Patient without symptoms), date: 2020,  (-) no seizures   Cardiovascular:     (+) Dyslipidemia hypertension-----Previous cardiac testing   Echo: Date: 20 Results:  Interpretation Summary  The left ventricular systolic function is normal.  Left atrium is moderately enlarged by volume.  The left ventricular diastolic function is mildly abnormal (Grade I).  There is mild mitral regurgitation.  TR signal inadequate to allow accurate estimate RV systolic pressure.  No color doppler evidence for atrial septal defect or patent foramen ovale. A bubble study was not performed.    Stress Test: Date:  Results:  Interpretation Summary   1. Negative exercise stress echocardiogram for inducible  ischemia.   2. Normal size left ventricle with normal systolic function. EF 60%.   3. No significant valvular regurgitation or stenosis.   4. Very functional capacity for gender and age with appropriate heart rate and blood pressure response to exercise.    ECG Reviewed: Date: 10/11/21 Results:  Sinus rhythm, inferior infarct age undetermined.   Cath:  Date: Results:      METS/Exercise Tolerance: 3 - Able to walk 1-2 blocks without stopping    Hematologic: Comments: Patient is a Faith   (-) history of blood clots and history of blood transfusion   Musculoskeletal: Comment: Spinal stenosis with neurogenic claudication      (+) arthritis (s/p right total shoulder),     GI/Hepatic:  - neg GI/hepatic ROS   (+) GERD, Asymptomatic on medication,     Renal/Genitourinary:     (+) BPH,     Endo:  - neg endo ROS     Psychiatric/Substance Use:  - neg psychiatric ROS     Infectious Disease:  - neg infectious disease ROS     Malignancy:  - neg malignancy ROS     Other:  - neg other ROS          Physical Exam    Airway        Mallampati: I   TM distance: > 3 FB   Neck ROM: limited   Mouth opening: > 3 cm    Respiratory Devices and Support         Dental       (+) upper dentures and missing      Cardiovascular   cardiovascular exam normal          Pulmonary   pulmonary exam normal                OUTSIDE LABS:  CBC:   Lab Results   Component Value Date    WBC 9.8 10/11/2021    HGB 13.7 10/11/2021    HCT 43.3 10/11/2021     10/11/2021     BMP:   Lab Results   Component Value Date     10/11/2021    POTASSIUM 4.1 10/11/2021    CHLORIDE 108 10/11/2021    CO2 28 10/11/2021    BUN 12 10/11/2021    CR 0.89 10/11/2021     (H) 11/01/2021     (H) 10/11/2021     COAGS: No results found for: PTT, INR, FIBR  POC: No results found for: BGM, HCG, HCGS  HEPATIC: No results found for: ALBUMIN, PROTTOTAL, ALT, AST, GGT, ALKPHOS, BILITOTAL, BILIDIRECT, RANDOLPH  OTHER:   Lab Results   Component Value Date    TALHA  9.3 10/11/2021       Anesthesia Plan    ASA Status:  3      Anesthesia Type: General.     - Airway: ETT   Induction: Intravenous, Propofol.   Maintenance: Balanced.        Consents    Anesthesia Plan(s) and associated risks, benefits, and realistic alternatives discussed. Questions answered and patient/representative(s) expressed understanding.     - Discussed with:  Patient      - Extended Intubation/Ventilatory Support Discussed: No.      - Patient is DNR/DNI Status: No    Blood products discussed: no blood products except for albumin and cell saver.     Postoperative Care    Pain management: IV analgesics.   PONV prophylaxis: Ondansetron (or other 5HT-3), Dexamethasone or Solumedrol     Comments:    GA with ETT  Risks versus benefits discussed  Jahovah;s witness  All questions answered.   Accepts albumin and cell saver            PAC Discussion and Assessment    ASA Classification: 3  Case is suitable for: [facility]  Anesthetic techniques and relevant risks discussed: GA                  PAC Resident/NP Anesthesia Assessment: Guevara Jones is a 81 year old male who is scheduled for Open laminectomies lumbar 2-4 (2 levels). on 11/1/21 by Dr. Manriquez in treatment of Spinal stenosis of lumbar region with neurogenic claudication.  PAC referral for risk assessment and optimization for anesthesia with comorbid conditions of HTN, HLD, History of CVA, history of concussion, ataxia, BPH, osteoarthritis:    Pre-operative considerations:  1.  Cardiac:  Functional status- METS 3, the patient is able to walk 1 block but then has to stop due to intense pain from his back. He denies chest tightness or pain. He does note that he feels he has some shortness of breath while sitting but denies any with exercise. He had an echo on 7/7/20 with normal EF 60.1%, grade I diastolic dysfunction and no wall motion. He has not had a change in his functional status since this testing. EKG was completed today as no prior record of EKG and  this showed normal sinus rhythm with inferior infarct age undetermined. Discussed with Dr. Stephens and will get BNP and unless greatly elevated no further testing indicated given his previous testing and no changes.  Intermediate risk surgery with 0.9% (RCRI #) risk of major adverse cardiac event.   ~ HTN/ HLD - Will hold ASA 325mg for 7 days prior    2.  Pulm:  Airway feasible.  DIANDRA risk: Intermediate (male, age, HTN)  ~ Former smoker - remote and quit in 1970.     3. Neuro: History of CVA 7/2020 - seen on imaging only. The patient never had any symptoms.   ~ History of concussion in 7/2020.   ~ Ataxia - the patient uses a cane. Fall precautions indicated.     4. GI:  Risk of PONV score = 2.  If > 2, anti-emetic intervention recommended.  ~ GERD - patient uses TUMS PRN for intermittent food related symptoms.     5. : BPH - continue flomax. He denies UTI symptoms.     6. Musculoskeletal: osteoarthritis - s/p right TSA  ~ Spinal stenosis with neurogenic claudication - procedure as above.     7. Heme: The patient is a [employer]. Will check CBC with iron as no prior labs to review. He is open to cell saver if needed.     VTE risk: 1.8%    Patient is optimized and is acceptable candidate for the proposed procedure.  No further diagnostic evaluation is needed.     Patient discussed with Dr. Stephens    For further details of assessment, testing, and physical exam please see H and P completed on same date.    Meenakshi Davies PA-C      Mid-Level Provider/Resident: Meenakshi Davies PA-C  Date: 10/11/21        Reviewed and Signed by PAC Anesthesiologist  Anesthesiologist: Angelo  Date: 10/11/21                     Christophe Novoa MD

## 2021-11-01 NOTE — BRIEF OP NOTE
Brief Operative Note    Preop Dx:   Spinal stenosis of lumbar region with neurogenic claudication [M48.062]  Degenerative spondylolisthesis [M43.10]  Post op Dx:   Same  Procedure:    Procedure(s):  Open laminectomies lumbar 2-4, Repair of Dural Tear.    Surgeon:     Dr. Manriquez   Assistants:    Alissa Giles PA-C  Anesthesia:   General  EBL:    100mL   Total IV Fluids:  See Anesthesia Record  Specimens:   None  Findings:   See Operative Dictation  Complications:  Small Dural tear; repaired intra-op.    Assessment and Plan: Guevara Jones is a 81 year old male with PMH including hx CVA, HTN, dyslipidemia, GERD, BPH now s/p above procedure on 11/1/21 with Dr. Manriquez.     melony [facility]  Activity:   - Up with assist until independent. No excessive bending or twisting. No lifting >10 lbs x 6 weeks.   Weight bearing status: WBAT.  Pain management: PO narcotics as tolerated.   Antibiotics: Ancef intra-op  Diet: Begin with clear fluids and progress diet as tolerated.   DVT prophylaxis: SCDs only. No chemical DVT ppx needed.  Imaging: none  Labs: none  Bracing/Splinting: None.  Dressings: Keep dressing c/d/i x 2 days.  Drains: HV superficial  Gaines catheter: not used.  Physical Therapy/Occupational Therapy: Eval and treat.  Cultures: none.    Follow-up: Clinic with Dr. Manriquez in 6 weeks with repeat x-rays.   Disposition: Pending progress with therapies, pain control on orals, and medical stability, anticipate discharge to home on POD #1.        The procedure was medically necessary for an assistant. My assistance was necessary for patient positioning, prepping and draping, soft tissue retraction, and closure. The assistance that I provided reduced operative time which meant less general anesthetic for the patient.    Alissa Giles PA-C  Orthopedic Spine Surgery    Thank you for allowing me to participate in this patient's care. Please page me directly any questions/concerns.   Securely message with the [employer]  Web Console (learn more here)  Text page via [facility] Paging/Directory    If there is no response, if it is a weekend, or if it is during evening hours, please page the orthopaedic surgery resident on call via [facility] Paging/Directory

## 2021-11-02 ENCOUNTER — APPOINTMENT (OUTPATIENT)
Dept: PHYSICAL THERAPY | Facility: CLINIC | Age: 81
End: 2021-11-02
Attending: PHYSICIAN ASSISTANT
Payer: COMMERCIAL

## 2021-11-02 PROCEDURE — 250N000013 HC RX MED GY IP 250 OP 250 PS 637: Performed by: INTERNAL MEDICINE

## 2021-11-02 PROCEDURE — 97116 GAIT TRAINING THERAPY: CPT | Mod: GP

## 2021-11-02 PROCEDURE — 99213 OFFICE O/P EST LOW 20 MIN: CPT | Performed by: INTERNAL MEDICINE

## 2021-11-02 PROCEDURE — 250N000013 HC RX MED GY IP 250 OP 250 PS 637: Performed by: PHYSICIAN ASSISTANT

## 2021-11-02 PROCEDURE — 97161 PT EVAL LOW COMPLEX 20 MIN: CPT | Mod: GP

## 2021-11-02 PROCEDURE — 97530 THERAPEUTIC ACTIVITIES: CPT | Mod: GP

## 2021-11-02 PROCEDURE — 99207 PR CDG-CODE CATEGORY CHANGED: CPT | Performed by: INTERNAL MEDICINE

## 2021-11-02 RX ORDER — METHOCARBAMOL 750 MG/1
750 TABLET, FILM COATED ORAL EVERY 6 HOURS PRN
Qty: 60 TABLET | Refills: 0 | Status: SHIPPED | OUTPATIENT
Start: 2021-11-02 | End: 2021-11-04

## 2021-11-02 RX ORDER — ACETAMINOPHEN 325 MG/1
650 TABLET ORAL EVERY 4 HOURS PRN
Qty: 60 TABLET | Refills: 0 | Status: SHIPPED | OUTPATIENT
Start: 2021-11-02 | End: 2021-11-04

## 2021-11-02 RX ORDER — AMOXICILLIN 250 MG
1 CAPSULE ORAL DAILY
Qty: 30 TABLET | Refills: 0 | Status: SHIPPED | OUTPATIENT
Start: 2021-11-02 | End: 2021-11-04

## 2021-11-02 RX ORDER — HYDROXYZINE HYDROCHLORIDE 10 MG/1
10 TABLET, FILM COATED ORAL EVERY 6 HOURS PRN
Qty: 30 TABLET | Refills: 0 | Status: SHIPPED | OUTPATIENT
Start: 2021-11-02 | End: 2021-11-04

## 2021-11-02 RX ORDER — OXYCODONE HYDROCHLORIDE 5 MG/1
5-10 TABLET ORAL EVERY 4 HOURS PRN
Qty: 35 TABLET | Refills: 0 | Status: SHIPPED | OUTPATIENT
Start: 2021-11-02 | End: 2021-11-04

## 2021-11-02 RX ADMIN — OXYCODONE HYDROCHLORIDE 5 MG: 5 TABLET ORAL at 20:22

## 2021-11-02 RX ADMIN — FAMOTIDINE 20 MG: 20 TABLET ORAL at 20:22

## 2021-11-02 RX ADMIN — METHOCARBAMOL 500 MG: 500 TABLET ORAL at 22:29

## 2021-11-02 RX ADMIN — GABAPENTIN 600 MG: 600 TABLET, FILM COATED ORAL at 20:22

## 2021-11-02 RX ADMIN — TAMSULOSIN HYDROCHLORIDE 0.4 MG: 0.4 CAPSULE ORAL at 20:22

## 2021-11-02 RX ADMIN — ACETAMINOPHEN 975 MG: 325 TABLET, FILM COATED ORAL at 01:00

## 2021-11-02 RX ADMIN — ACETAMINOPHEN 975 MG: 325 TABLET, FILM COATED ORAL at 09:25

## 2021-11-02 RX ADMIN — OXYCODONE HYDROCHLORIDE 10 MG: 10 TABLET ORAL at 04:15

## 2021-11-02 RX ADMIN — DOCUSATE SODIUM AND SENNOSIDES 1 TABLET: 8.6; 5 TABLET ORAL at 20:22

## 2021-11-02 RX ADMIN — METHOCARBAMOL 500 MG: 500 TABLET ORAL at 01:01

## 2021-11-02 RX ADMIN — GABAPENTIN 600 MG: 600 TABLET, FILM COATED ORAL at 08:13

## 2021-11-02 RX ADMIN — OXYCODONE HYDROCHLORIDE 5 MG: 5 TABLET ORAL at 14:51

## 2021-11-02 RX ADMIN — FAMOTIDINE 20 MG: 20 TABLET ORAL at 08:13

## 2021-11-02 RX ADMIN — OXYCODONE HYDROCHLORIDE 5 MG: 5 TABLET ORAL at 10:21

## 2021-11-02 RX ADMIN — POLYETHYLENE GLYCOL 3350 17 G: 17 POWDER, FOR SOLUTION ORAL at 08:13

## 2021-11-02 RX ADMIN — OXYCODONE HYDROCHLORIDE 5 MG: 5 TABLET ORAL at 00:19

## 2021-11-02 RX ADMIN — DOCUSATE SODIUM AND SENNOSIDES 1 TABLET: 8.6; 5 TABLET ORAL at 08:13

## 2021-11-02 RX ADMIN — OXYCODONE HYDROCHLORIDE 5 MG: 5 TABLET ORAL at 09:26

## 2021-11-02 RX ADMIN — ACETAMINOPHEN 975 MG: 325 TABLET, FILM COATED ORAL at 16:40

## 2021-11-02 NOTE — PROGRESS NOTES
"Orthopedic Surgery Progress Note    Assessment and Plan:   Guevara Jones is a 81 year old male with PMH including hx CVA, HTN, dyslipidemia, GERD, BPH now s/p L2-L4 laminectomies w/ repair of small dural tear on 11/1/21 with Dr. Manriquez.     Goal for today: rubin out, drain out, therapy, being dispo planning, may be able to DC home today if passing PT.     [facility], Medicine comanaging, appreciate cares  Activity:   - Up with assist until independent. No excessive bending or twisting. No lifting >10 lbs x 6 weeks.   Weight bearing status: WBAT.  Pain management: PO narcotics as tolerated.   Antibiotics: Ancef intra-op  Diet: Begin with clear fluids and progress diet as tolerated.   DVT prophylaxis: SCDs only. No chemical DVT ppx needed.  Imaging: none  Labs: none  Bracing/Splinting: None.  Dressings: Keep dressing c/d/i x 2 days.  Drains: HV superficial  Rubin catheter: not used.  Physical Therapy/Occupational Therapy: Eval and treat.  Cultures: none.    Follow-up: Clinic with Dr. Manriquez in 6 weeks with repeat x-rays.   Disposition: Pending progress with therapies, pain control on orals, and medical stability, anticipate discharge to home on POD #1.    Garrison Barrientos MD  Orthopaedic Surgery, PGY-4  Pager: [phone]    =========================================================    Subjective:   NAEO. Pain controlled. Tolerating diet. Rubin in place. +Flatus, no BM. Denies HA, CP, SOB, numbness or tingling, motor dysfunction or weakness. Has not yet worked w/ therapy.    Exam:  /50 (BP Location: Right arm)   Pulse 60   Temp 98.3  F (36.8  C) (Oral)   Resp 12   Ht 1.753 m (5' 9.02\")   Wt 92.1 kg (203 lb 0.7 oz)   SpO2 94%   BMI 29.97 kg/m    Gen: Awake, alert, NAD  Resp: non-labored breathing  CV: Extr wwp  MSK: Dressing c/d/i.   DP and PT 2+. SILT L3-S1 bilaterally.   L2-3: Hip flexion L and R 5/5 strength    L4:  Knee extension L and R 5/5 strength   L5:  Foot / EHL dorsiflexion L and R 5/5 " strength   S1:  Plantarflexion  L and R 5/5 strength    Drain: 0ml overnight    Labs:  Recent Labs   Lab Test 11/01/21  1010 10/11/21  1248   HGB  --  13.7   WBC  --  9.8   PLT  --  244   CR  --  0.89   * 104*       Imaging:  None needed

## 2021-11-02 NOTE — PLAN OF CARE
"      VS:   /54 (BP Location: Right arm)   Pulse 94   Temp 98.3  F (36.8  C) (Oral)   Resp 18   Ht 1.753 m (5' 9.02\")   Wt 92.1 kg (203 lb 0.7 oz)   SpO2 94%   BMI 29.97 kg/m       Output:   Gaines patent   NO BM per shift      Activity:   Assist of 2. Repositioned frequently.   Up to commode with assist of 2 walker & gait belt   Skin: Incisions    Pain:   Mild incision pain with PRN oxy, robaxin. Per patient, pain more in right leg.    Neuro/CMS:   A&Ox4  Baseline bilateral Lower extremities numbness & tingling   Dressing(s):   CDI    Diet:   Full liquid. Advance as tolerated    LDA:   PIV infusing LR.    Equipment:   Call light, IV pole, PCD's    Plan:   Continue to monitor POC. Patient is outpatient.    Additional Info:                [id number]  Outpatient goals:      - Vital signs and mental status at baseline : Met     - Oral intake tolerated (at least 100 mLs fluid PO, at least 25% of a snack or meal, and oral intake on at least two separate occasions): Pending     - Able to ambulate at least 30 feet: Pending     - Must void prior to discharge unless Gaines inserted per Neurosurgery Bladder Management Algorithm: Pending     - Adequate pain control using oral analgesics: Met     - Hypercapnia, hypoventilation or hypoxia resolved for at least 2 hours without supplemental oxygen: Met     - Deficits in sensation, mobility or coordination have resolved if spinal or regional anesthesia was used: Pending     [id number]  - Vital signs and mental status at baseline : Met     - Oral intake tolerated (at least 100 mLs fluid PO, at least 25% of a snack or meal, and oral intake on at least two separate occasions): Pending     - Able to ambulate at least 30 feet: Pending     - Must void prior to discharge unless Gaines inserted per Neurosurgery Bladder Management Algorithm: Pending     - Adequate pain control using oral analgesics: Met     - Hypercapnia, hypoventilation or hypoxia resolved for at least 2 hours " without supplemental oxygen: Met     - Deficits in sensation, mobility or coordination have resolved if spinal or regional anesthesia was used: Pending

## 2021-11-02 NOTE — CONSULTS
[facility]  Consult Note - Hospitalist Service     Date of Admission:  11/1/2021  Reason for Consult: Medical management    Assessment & Plan     A: Patient is an 82 y/o man who has multiple medical problems including hypertension, hyperlipidemia, BPH and past CVA. Patient underwent surgical intervention for lumbar spinal stenosis with neurogenic claudication earlier today - patient underwent open decompressive laminectomies with bilateral medial facetectomies and foraminotomies L2-3 and L3-4 and repair of small dural tear, mid dorsal L3-4 level.    Patient's blood pressure is currently elevated. Patient reported reluctance to take medications. Patient's hypertension appears uncontrolled as outpatient.    P:  1.) Hypertension: IV hydralazine as needed. After discussion, patient to be started on norvasc 2.5 mg daily. Patient was advised to discuss strategies for better blood pressure control as outpatient in light of his having a CVA in the past. Patient was also advised to limit salt intake as outpatient.  2.) BPH: Patient to continue flomax.  3.) Lumbar spinal stenosis with neurogenic claudication s/p surgical intervention: Patient receiving post-operative care.  4.) Personal history of CVA with no residual effects: Patient was advised of the need for better blood pressure control as outpatient for secondary prevention.      Reynaldo Lou MD  [facility]  Securely message with the [employer] Web Console (learn more here)  Text page via [employer] Paging/Directory        Clinically Significant Risk Factors Present on Admission              # Platelet Defect: home medication list includes an antiplatelet medication      ______________________________________________________________________    Chief Complaint     Lumbar spinal stenosis with neurogenic claudication    History of Present Illness     Patient is an 82 y/o man who has multiple  medical problems including hypertension, hyperlipidemia, BPH and past CVA. Patient underwent surgical intervention for lumbar spinal stenosis with neurogenic claudication earlier today.     Patient reports having pain at incision site at present. Patient reports no new leg weakness or numbness. Patient reports no fever, no chills, no nausea and no vomiting. Patient notes no cough, no wheezing and no dyspnea. Patient reports having some lower leg edema at baseline but reports that edema is currently better than baseline. Patient reports no other problems at this point in time.    Patient acknowledges not taking antihypertensives and expressed reluctance to have these added to his medication regimen. Patient states that his systolic blood pressure is usually around 170 as outpatient.    Review of Systems   - 10 point review of systems unremarkable aside from what was mentioned in HPI    Past Medical History    I have reviewed this patient's medical history and updated it with pertinent information if needed.   Past Medical History:   Diagnosis Date     Ataxia      BPH (benign prostatic hyperplasia)      History of concussion      History of CVA (cerebrovascular accident)      HLD (hyperlipidemia)      HTN (hypertension)      Osteoarthritis      Spinal stenosis of lumbar region with neurogenic claudication        Past Surgical History   I have reviewed this patient's surgical history and updated it with pertinent information if needed.  Past Surgical History:   Procedure Laterality Date     APPENDECTOMY       TOTAL SHOULDER ARTHROPLASTY Right        Social History   I have reviewed this patient's social history and updated it with pertinent information if needed.  Social History     Tobacco Use     Smoking status: Former Smoker     Quit date: 1970     Years since quittin.8     Smokeless tobacco: Never Used   Substance Use Topics     Alcohol use: None     Drug use: None       Family History   - Noncontributory to  reason for presentation    Medications   Medications Prior to Admission   Medication Sig Dispense Refill Last Dose     Acetaminophen 325 MG CAPS Take 325-650 mg by mouth every 4 hours as needed   11/1/2021 at 0630     gabapentin (NEURONTIN) 600 MG tablet Take 600 mg by mouth 2 times daily 1 in the morning, two in the evening   11/1/2021 at 0630     naproxen sodium 220 MG capsule Take 220 mg by mouth daily as needed   Unknown at Unknown time     tamsulosin (FLOMAX) 0.4 MG capsule Take 0.4 mg by mouth every evening    10/31/2021 at Unknown time     Alfalfa 250 MG TABS 2 tablets   10/25/2021     Ascorbic Acid (VITAMIN C) POWD    10/25/2021     aspirin (ASA) 325 MG tablet Take 325 mg by mouth every morning   10/25/2021     cod liver oil CAPS capsule    10/25/2021     Glucosamine-Chondroitin--300-250 MG TABS    10/25/2021     ibuprofen (ADVIL/MOTRIN) 600 MG tablet 1 tablet with food or milk as needed   10/25/2021     PHOSPHATIDYL CHOLINE PO    10/25/2021     red yeast rice 600 MG CAPS Take 600 mg by mouth daily   10/25/2021       Allergies   Allergies   Allergen Reactions     Latex      Lentil        Physical Exam   Vital Signs: Temp: 98  F (36.7  C) Temp src: Oral BP: (!) 180/109 Pulse: 92   Resp: 13 SpO2: 95 % O2 Device: Nasal cannula Oxygen Delivery: 2 LPM  Weight: 203 lbs .7 oz    General: Patient comfortable, NAD.  Eyes: No scleral icterus or conjunctival injection.  Cardiovascular: Heart RRR, S1 S2 w/o murmurs. Legs nonswollen.  Lungs: Breath sounds present. No crackles/wheezes heard.  Gastrointestinal: Abdomen soft, nontender.  Musculoskeletal: Good muscle tone.  Skin: Warm, dry.  Neuro: Cranial nerves II-XII grossly intact.  Psych: Affect pleasant.

## 2021-11-02 NOTE — ANESTHESIA POSTPROCEDURE EVALUATION
Patient: Guevara Jones    Procedure: Procedure(s):  Open laminectomies lumbar 2-4, Repair of Dural Tear.         Diagnosis:Spinal stenosis of lumbar region with neurogenic claudication [M48.062]  Degenerative spondylolisthesis [M43.10]  Diagnosis Additional Information: No value filed.    Anesthesia Type:  General    Note:  Disposition: Inpatient   Postop Pain Control: Uneventful            Sign Out: Well controlled pain   PONV: No   Neuro/Psych: Uneventful            Sign Out: Acceptable/Baseline neuro status   Airway/Respiratory: Uneventful            Sign Out: Acceptable/Baseline resp. status   CV/Hemodynamics: Uneventful            Sign Out: Acceptable CV status; No obvious hypovolemia; No obvious fluid overload   Other NRE: NONE   DID A NON-ROUTINE EVENT OCCUR? No           Last vitals:  Vitals Value Taken Time   /78 11/01/21 1900   Temp 36.5  C (97.7  F) 11/01/21 1815   Pulse 91 11/01/21 1904   Resp 0 11/01/21 1904   SpO2 97 % 11/01/21 1904   Vitals shown include unvalidated device data.    Electronically Signed By: Betty Green MD  November 1, 2021  7:05 PM

## 2021-11-02 NOTE — PLAN OF CARE
[facility]      OUTPATIENT PHYSICAL THERAPY EVALUATION  PLAN OF TREATMENT FOR OUTPATIENT REHABILITATION  (COMPLETE FOR INITIAL CLAIMS ONLY)  Patient's Last Name, First Name, M.I.  YOB: 1940  Guevara Jones                        Provider's Name  [facility] Medical Record No.  [id number]                               Onset Date:  11/01/21   Start of Care Date:  11/02/21      Type:     _X_PT   ___OT   ___SLP Medical Diagnosis:  spine surgery                        PT Diagnosis:  impaired functional mobility   Visits from SOC:  1   _________________________________________________________________________________  Plan of Treatment/Functional Goals    Planned Interventions: balance training, gait training, patient/family education, stair training, home program guidelines, progressive activity/exercise, transfer training, neuromuscular re-education     Goals: See Physical Therapy Goals on Care Plan in [employer] electronic health record.    Therapy Frequency: Daily  Predicted Duration of Therapy Intervention: 2 days  _________________________________________________________________________________    I CERTIFY THE NEED FOR THESE SERVICES FURNISHED UNDER        THIS PLAN OF TREATMENT AND WHILE UNDER MY CARE     (Physician co-signature of this document indicates review and certification of the therapy plan).              Certification date from: 11/02/21, Certification date to: 11/03/21    Referring Physician: Alissa Giles PA-C            Initial Assessment        See Physical Therapy evaluation dated 11/02/21 in Epic electronic health record.

## 2021-11-02 NOTE — PROGRESS NOTES
[facility]    Medicine Progress Note - Hospitalist Service       Date of Admission:  11/1/2021    Assessment & Plan           Patient is an 82 y/o man who has multiple medical problems including hypertension, hyperlipidemia, BPH and past CVA. Patient underwent surgical intervention for lumbar spinal stenosis with neurogenic claudication 11/1 - patient underwent open decompressive laminectomies with bilateral medial facetectomies and foraminotomies L2-3 and L3-4 and repair of small dural tear, mid dorsal L3-4 level.     BP was elevated post op      P:  1.) Hypertension: IV hydralazine as needed.   Amlodipine was stopped as in a m BP was on lower side and patient was instructed to follow up as an outpatient .   Patient was advised to discuss strategies for better blood pressure control as outpatient in light of his having a CVA in the past. Patient was also advised to limit salt intake as outpatient.  2.) BPH: Patient to continue flomax.  3.) Lumbar spinal stenosis with neurogenic claudication s/p surgical intervention:/  Pain management , DVT prophylaxis, Activity , Wound cares, Dressing changes, Phyllis-operative antibiotics per primary orthopedic service .  4.) Personal history of CVA with no residual effects: Patient was advised of the need for better blood pressure control as outpatient for secondary prevention.          Diet: Regular Diet Adult    DVT Prophylaxis: Defer to primary service  Gaines Catheter: Not present  Central Lines: None  Code Status: Full Code           The patient's care was discussed with the Bedside Nurse, Care Coordinator/ and Patient.    Kaykay Queen MD  Hospitalist Service  [facility]  Securely message with the [employer] Web Console (learn more here)  Text page via [employer] Paging/Directory        Clinically Significant Risk Factors Present on Admission              # Platelet Defect:  home medication list includes an antiplatelet medication      ______________________________________________________________________    Interval History   No new symptoms reported per nursing staff .  Last night notes reviewed .  No chest pain or Shortness of breath reported.  No vomiting   No difficulty with voiding   Passing gas .    4 system ROS reviewed .    Data reviewed today: I reviewed all medications, new labs and imaging results over the last 24 hours.    Physical Exam   Vital Signs: Temp: 98.3  F (36.8  C) Temp src: Oral BP: 115/50 Pulse: 60   Resp: 12 SpO2: 94 % O2 Device: Nasal cannula Oxygen Delivery: 2 LPM  Weight: 203 lbs .7 oz  Constitutional: awake, alert, cooperative, no apparent distress, and appears stated age  Eyes: Lids and lashes normal, pupils equal, round and reactive to light, extra ocular muscles intact, sclera clear, conjunctiva normal  Hematologic / Lymphatic: no cervical lymphadenopathy  Respiratory: No increased work of breathing, good air exchange, clear to auscultation bilaterally, no crackles or wheezing  Cardiovascular: Normal apical impulse, regular rate and rhythm, normal S1 and S2, no S3 or S4, and no murmur noted  GI: No scars, normal bowel sounds, soft, non-distended, non-tender, no masses palpated, no hepatosplenomegally    Data   Recent Labs   Lab 11/01/21  1010   *

## 2021-11-02 NOTE — PROGRESS NOTES
"Orthopedic Surgery Progress Note    Assessment and Plan:   Guevara Jones is a 81 year old male with PMH including hx CVA, HTN, dyslipidemia, GERD, BPH now s/p L2-L4 laminectomies w/ repair of small dural tear on 11/1/21 with Dr. Manriquez.     Goal for 11/3: DC home     [facility], Medicine comanaging, appreciate cares  Activity:   - Up with assist until independent. No excessive bending or twisting. No lifting >10 lbs x 6 weeks.   Weight bearing status: WBAT.  Pain management: PO narcotics as tolerated.   Antibiotics: Ancef intra-op  Diet: Begin with clear fluids and progress diet as tolerated.   DVT prophylaxis: SCDs only. No chemical DVT ppx needed.  Imaging: none  Labs: none  Bracing/Splinting: None.  Dressings: Keep dressing c/d/i x 2 days.  Drains: HV superficial fell out 11/2  Gaines catheter: not used.  Physical Therapy/Occupational Therapy: Eval and treat.  Cultures: none.    Follow-up: Clinic with Dr. Manriquez in 6 weeks with repeat x-rays.   Disposition: Pending progress with therapies, pain control on orals, and medical stability, anticipate discharge to home on POD #1.    Garrison Barrientos MD  Orthopaedic Surgery, PGY-4  Pager: [phone]    =========================================================    Subjective:   NAEO. Pain controlled. HV drain fell out yesterday. Passed therapy yesterday, anticipate DC home today.    Exam:  BP (!) 146/83 (BP Location: Left arm)   Pulse 56   Temp 97.6  F (36.4  C) (Oral)   Resp 14   Ht 1.753 m (5' 9.02\")   Wt 92.1 kg (203 lb 0.7 oz)   SpO2 95%   BMI 29.97 kg/m    Gen: Awake, alert, NAD  Resp: non-labored breathing  CV: Extr wwp  MSK: Dressing c/d/i.   DP and PT 2+. SILT L3-S1 bilaterally.   L2-3: Hip flexion L and R 5/5 strength    L4:  Knee extension L and R 5/5 strength   L5:  Foot / EHL dorsiflexion L and R 5/5 strength   S1:  Plantarflexion  L and R 5/5 strength    Drain: fell out 11/2    Labs:  Recent Labs   Lab Test 11/01/21  1010 10/11/21  1248   HGB  " --  13.7   WBC  --  9.8   PLT  --  244   CR  --  0.89   * 104*       Imaging:  None needed

## 2021-11-02 NOTE — PROGRESS NOTES
"   11/02/21 1200   Quick Adds   Type of Visit Initial PT Evaluation   Living Environment   People in home alone   Current Living Arrangements house   Home Accessibility stairs to enter home;stairs within home   Number of Stairs, Main Entrance 3   Stair Railings, Main Entrance railing on left side (ascending)   Number of Stairs, Within Home, Primary other (see comments)  (has stair lift)   Living Environment Comments pt lives alone, chair lift to access basement with walk in shower and laundry   Self-Care   Usual Activity Tolerance good   Current Activity Tolerance moderate   Regular Exercise No   Equipment Currently Used at Home cane, straight;other (see comments);shower chair  (4WW)   Activity/Exercise/Self-Care Comment Venkat with SEC and 4WW- pt reports 'furniture walking' occasionally. Pt is IND/Venkat with ADLs   Disability/Function   Hearing Difficulty or Deaf yes   Wear Glasses or Blind yes   Vision Management glasses   Concentrating, Remembering or Making Decisions Difficulty no   Difficulty Communicating no   Difficulty Eating/Swallowing no   Walking or Climbing Stairs Difficulty yes   Walking or Climbing Stairs ambulation difficulty, requires equipment   Mobility Management Venkat with 4WW or cane   Dressing/Bathing Difficulty yes   Dressing/Bathing bathing difficulty, requires equipment   Dressing/Bathing Management shower chair   Toileting issues no   Doing Errands Independently Difficulty (such as shopping) no   Fall history within last six months yes   Number of times patient has fallen within last six months 4   Change in Functional Status Since Onset of Current Illness/Injury yes   General Information   Onset of Illness/Injury or Date of Surgery 11/01/21   Referring Physician Alissa Giles, PABaldevC   Pertinent History of Current Problem (include personal factors and/or comorbidities that impact the POC) per chart review, \"Patient is an 82 y/o man who has multiple medical problems including hypertension, " "hyperlipidemia, BPH and past CVA. Patient underwent surgical intervention for lumbar spinal stenosis with neurogenic claudication 11/1 - patient underwent open decompressive laminectomies with bilateral medial facetectomies and foraminotomies L2-3 and L3-4 and repair of small dural tear, mid dorsal L3-4 level.\"   Existing Precautions/Restrictions fall;spinal   Weight-Bearing Status - LLE weight-bearing as tolerated   Weight-Bearing Status - RLE weight-bearing as tolerated   General Observations activity: up with assist   Cognition   Orientation Status (Cognition) oriented x 3   Affect/Mental Status (Cognition) WNL   Follows Commands (Cognition) WNL   Pain Assessment   Patient Currently in Pain Yes, see Vital Sign flowsheet   Integumentary/Edema   Integumentary/Edema Comments consistent with spine surgery   Posture    Posture Not impaired   Range of Motion (ROM)   ROM Comment WFL   Strength   Strength Comments NT formally 2/2 spine precautions, but WFL per up with SBA   Bed Mobility   Comment (Bed Mobility) supervision supine<>sit, good log roll technique   Transfers   Transfer Safety Comments SBA to FWW, vc needed for technique (tx)   Gait/Stairs (Locomotion)   Comment (Gait/Stairs) pt ambulates x20' with FWW, slow slight shuffled gait, x1 posterior LOB standing in front of door needing Bar from PT to correct   Balance   Balance Comments x3 LOB throughout session with ambulation, needing PT assist x1 to correct. Otherwise self corrects. Pt reports baseline balance deficits due to previous CVA, usually result of retropulsion.   Sensory Examination   Sensory Perception patient reports no sensory changes   Clinical Impression   Criteria for Skilled Therapeutic Intervention yes, treatment indicated   PT Diagnosis (PT) impaired functional mobility   Influenced by the following impairments pain, impaired balance, weakness   Functional limitations due to impairments gait, stairs, transfers   Clinical Presentation " Stable/Uncomplicated   Clinical Presentation Rationale PMH, clinician impression   Clinical Decision Making (Complexity) low complexity   Therapy Frequency (PT) Daily   Predicted Duration of Therapy Intervention (days/wks) 2 days   Planned Therapy Interventions (PT) balance training;gait training;patient/family education;stair training;home program guidelines;progressive activity/exercise;transfer training;neuromuscular re-education   Risk & Benefits of therapy have been explained evaluation/treatment results reviewed;care plan/treatment goals reviewed;risks/benefits reviewed;current/potential barriers reviewed;participants voiced agreement with care plan;participants included;patient   Clinical Impression Comments see PT DC planner   PT Discharge Planning    PT Discharge Recommendation (DC Rec) home with assist   PT Rationale for DC Rec pt slightly below baseline post op, mostly limited by pain. Pt has baseline balance deficits that are slightly worsened post op. Balance improved throughout session, so anticipate will be safe to return home tomorrow with use of 4WW or SEC at all times in home.   PT Brief overview of current status  Ax1 with FWW- please ensure hallway ambulation 2x more today to support plan for DC home tomorrow.   Therapy Certification   Start of care date 11/02/21   Certification date from 11/02/21   Certification date to 11/03/21   Medical Diagnosis spine surgery   Total Evaluation Time   Total Evaluation Time (Minutes) 5

## 2021-11-02 NOTE — PLAN OF CARE
"      VS:   Blood pressure 115/50, pulse 60, temperature 98.3  F (36.8  C), temperature source Oral, resp. rate 12, height 1.753 m (5' 9.02\"), weight 92.1 kg (203 lb 0.7 oz), SpO2 94 %.  On RA.     Output: Voiding spontaneously after rubin removed at 0835, no BM this shift. LBM 10/31. HV pulled out accidentally by pt, provider paged and recommended to reinforce dressing.    Lungs: Clear bilateral, denied SOB and Chest pain.   Activity: SBA with walker and GB, ambulated in the calhoun with PT. Steady gait.    Skin: Intact except for spine incision.    Pain:   Incision site pain, managed well with PRN oxy 10 mg Q 4 hr.    Neuro/CMS:   A & O x4, baseline N/T on BLE, no new complain of N/T.    Dressing(s):   Spine dressing CDI.    Diet:   Good appetite on R/T diet, denied nausea.    LDA:   L, R PIV, SL.    Equipment:   Walker, call light.    Plan:   Continue with POC.    Additional Info:          "

## 2021-11-02 NOTE — DISCHARGE SUMMARY
Orthopaedic Surgery Discharge Summary    Name: Guevara Jones  MRN: [id number]  YOB: 1940    Date of Admission: 11/1/2021  Date of Discharge: 11/05/21  Attending Physician: Dr. Manriquez    Admission Diagnosis:  Spinal stenosis of lumbar region with neurogenic claudication [M48.062]  Degenerative spondylolisthesis [M43.10]    Discharge Diagnosis:  same    Procedures Performed:  11/1     Brief History of Present Illness:  Guevara Jones is a 81 year old male with PMH including hx CVA, HTN, dyslipidemia, GERD, BPH now s/p L2-L4 laminectomies w/ repair of small dural tear on 11/1/21 with Dr. Manriquez.     Brief Hospital Course:  The patient was admitted to the hospital and underwent the above listed procedure.  Hospital course was uneventful.  Patient was seen by physical therapy in hospital,  received postoperative antibiotics, and received SCDs for DVT prophylaxis.    On POD#4, patient was tolerating a regular diet, voiding on own, had pain controlled on oral pain medications and was felt to be medically stable for d/c to TCU.    Exam at time of Discharge: see progress note from date of discharge    Consults:  Internal Medicine    Discharge Medications   Flo Jones   Home Medication Instructions HORTENSIA:[id number]    Printed on:11/02/21 8088   Medication Information                      acetaminophen (TYLENOL) 325 MG tablet  Take 2 tablets (650 mg) by mouth every 4 hours as needed for pain             Alfalfa 250 MG TABS  2 tablets             Ascorbic Acid (VITAMIN C) POWD               aspirin (ASA) 325 MG tablet  Take 325 mg by mouth every morning             cod liver oil CAPS capsule               gabapentin (NEURONTIN) 600 MG tablet  Take 600 mg by mouth 2 times daily 1 in the morning, two in the evening             Glucosamine-Chondroitin--300-250 MG TABS               hydrOXYzine (ATARAX) 10 MG tablet  Take 1 tablet (10 mg) by mouth every 6 hours as needed for itching (and nausea)              methocarbamol (ROBAXIN) 750 MG tablet  Take 1 tablet (750 mg) by mouth every 6 hours as needed for muscle spasms (muscle spasm)             oxyCODONE (ROXICODONE) 5 MG tablet  Take 1-2 tablets (5-10 mg) by mouth every 4 hours as needed for severe pain (decrease number of tablets as pain improves)             PHOSPHATIDYL CHOLINE PO               red yeast rice 600 MG CAPS  Take 600 mg by mouth daily             senna-docusate (SENOKOT-S/PERICOLACE) 8.6-50 MG tablet  Take 1 tablet by mouth daily             tamsulosin (FLOMAX) 0.4 MG capsule  Take 0.4 mg by mouth every evening                  Discharge Instructions and Follow-up  Discharge Procedure Orders   Discharge Instructions   Order Comments: Review outpatient procedure discharge instructions as directed by Provider.     Notify Provider   Order Comments: Signs and symptoms of infection: Fever greater than 101, redness, swelling, heat at site, drainage, or pus     Discharge Instructions - Shower with incision NOT covered   Order Comments: You may shower 2 days after surgery.  You do not need to cover your surgical incision in the shower and may allow water and soap to run over top of the incision. Do not soak or submerge the incision underwater.     Discharge Instructions - No tub bathing   Order Comments: Tub bathing, swimming, or any other activities that will cause your incision to be submerged in water should be avoided until incision fully healed. Check with your provider.     Discharge Instructions - Change dressing   Order Comments: Wash hands prior to changing dressing daily. If no drainage on dressing, may leave open to air.     Discharge Instructions - Diet   Order Comments: Diet as tolerated. Return to diet before surgery.     Discharge Instructions - Lifting Limit (specify)   Order Comments: Lifting limit of  10 pounds. Sitting as tolerated. Avoid excessive forward or side bending, twisting, pushing, pulling, or reaching  until seen at  Post-op follow up appointment.     Return to Clinic - in 2 weeks   Order Comments: Return to Clinic in 6 weeks     Discharge Instructions   Order Comments: Check with Provider for instructions about when to start anticoagulant medication.     No driving or operating machinery while in a cervical collar   Order Comments: No driving or operating machinery while on narcotic medications.     No Alcohol   Order Comments: No Alcohol for 24 hours after procedure       Discharge Disposition: TCU    Garrison Barrientos MD  Orthopaedic Surgery, PGY-4  Pager: [phone]

## 2021-11-02 NOTE — PLAN OF CARE
"  VS:    BP (!) 148/80 (BP Location: Right arm)   Pulse 95   Temp 98.3  F (36.8  C) (Oral)   Resp 17   Ht 1.753 m (5' 9.02\")   Wt 92.1 kg (203 lb 0.7 oz)   SpO2 94%   BMI 29.97 kg/m      On O2 2L/NC >92%, denies SOB, denies any respiratory distress. Pt denies CP. CAPNO on.    Output:    Gaines in place and patent. LBM 10/31/21 per pt report.    Activity:     Assist  of 2 with bed mobility. Pt did not get out of bed.     Skin: Intact with exception of back surgery sites.    Pain:    Managed with scheduled and prn meds. Pt stated comfortable when assessed last.    Neuro/CMS:    Baseline Numbness to Bilateral feet. Denies tingling. Denies N/V.  PCD on.    Dressing(s):     Laminectomy surgical site CDI   Diet:    Full liquid, advance as tolerated. Pt had juice and water to drink. Wanted pudding and ate. Pt tolerated well.    Equipment:     CAPNO, IV Pole, PCD and personal belongings.    IV's/Drains:    Hemovac Intact no out put yet. PIV to Left SL. PIV to right hand infusing LR at 75ml/hr. Can be discontinued if adequate oral hydration and tolerated.     Plan:    Continue with POC. May discharge home if stable and pain is well controlled.    Additional Info:    Patient arrived on unit around 2010 on cart from PACU. Alert and oriented x4. Able to make needs Known.                "

## 2021-11-03 ENCOUNTER — APPOINTMENT (OUTPATIENT)
Dept: PHYSICAL THERAPY | Facility: CLINIC | Age: 81
End: 2021-11-03
Attending: ORTHOPAEDIC SURGERY
Payer: COMMERCIAL

## 2021-11-03 PROCEDURE — 97530 THERAPEUTIC ACTIVITIES: CPT | Mod: GP

## 2021-11-03 PROCEDURE — 99207 PR CDG-CODE CATEGORY CHANGED: CPT | Performed by: INTERNAL MEDICINE

## 2021-11-03 PROCEDURE — 250N000013 HC RX MED GY IP 250 OP 250 PS 637: Performed by: INTERNAL MEDICINE

## 2021-11-03 PROCEDURE — 99213 OFFICE O/P EST LOW 20 MIN: CPT | Performed by: INTERNAL MEDICINE

## 2021-11-03 PROCEDURE — 97116 GAIT TRAINING THERAPY: CPT | Mod: GP

## 2021-11-03 PROCEDURE — 250N000013 HC RX MED GY IP 250 OP 250 PS 637: Performed by: CLINICAL NURSE SPECIALIST

## 2021-11-03 PROCEDURE — 250N000013 HC RX MED GY IP 250 OP 250 PS 637: Performed by: PHYSICIAN ASSISTANT

## 2021-11-03 RX ADMIN — OXYCODONE HYDROCHLORIDE 10 MG: 10 TABLET ORAL at 08:24

## 2021-11-03 RX ADMIN — FAMOTIDINE 20 MG: 20 TABLET ORAL at 08:19

## 2021-11-03 RX ADMIN — OXYCODONE HYDROCHLORIDE 10 MG: 10 TABLET ORAL at 18:51

## 2021-11-03 RX ADMIN — POLYETHYLENE GLYCOL 3350 17 G: 17 POWDER, FOR SOLUTION ORAL at 08:19

## 2021-11-03 RX ADMIN — MAGNESIUM CITRATE 286 ML: 1.75 LIQUID ORAL at 12:45

## 2021-11-03 RX ADMIN — METHOCARBAMOL 500 MG: 500 TABLET ORAL at 22:19

## 2021-11-03 RX ADMIN — ACETAMINOPHEN 975 MG: 325 TABLET, FILM COATED ORAL at 00:34

## 2021-11-03 RX ADMIN — MAGNESIUM HYDROXIDE 30 ML: 400 SUSPENSION ORAL at 13:43

## 2021-11-03 RX ADMIN — TAMSULOSIN HYDROCHLORIDE 0.4 MG: 0.4 CAPSULE ORAL at 19:00

## 2021-11-03 RX ADMIN — ACETAMINOPHEN 975 MG: 325 TABLET, FILM COATED ORAL at 09:28

## 2021-11-03 RX ADMIN — GABAPENTIN 600 MG: 600 TABLET, FILM COATED ORAL at 19:00

## 2021-11-03 RX ADMIN — DOCUSATE SODIUM AND SENNOSIDES 1 TABLET: 8.6; 5 TABLET ORAL at 19:00

## 2021-11-03 RX ADMIN — ACETAMINOPHEN 975 MG: 325 TABLET, FILM COATED ORAL at 17:02

## 2021-11-03 RX ADMIN — OXYCODONE HYDROCHLORIDE 10 MG: 10 TABLET ORAL at 14:56

## 2021-11-03 RX ADMIN — GABAPENTIN 600 MG: 600 TABLET, FILM COATED ORAL at 08:19

## 2021-11-03 RX ADMIN — DOCUSATE SODIUM AND SENNOSIDES 1 TABLET: 8.6; 5 TABLET ORAL at 08:19

## 2021-11-03 RX ADMIN — OXYCODONE HYDROCHLORIDE 10 MG: 10 TABLET ORAL at 00:35

## 2021-11-03 RX ADMIN — FAMOTIDINE 20 MG: 20 TABLET ORAL at 19:00

## 2021-11-03 RX ADMIN — OXYCODONE HYDROCHLORIDE 5 MG: 5 TABLET ORAL at 12:01

## 2021-11-03 NOTE — PLAN OF CARE
"  VS:   BP (!) 146/83 (BP Location: Left arm)   Pulse 56   Temp 97.6  F (36.4  C) (Oral)   Resp 14   Ht 1.753 m (5' 9.02\")   Wt 92.1 kg (203 lb 0.7 oz)   SpO2 95%   BMI 29.97 kg/m    RA   Continuous pulse ox    Output:   Voiding spontaneously. No BM per shift. Passing gas.   Right lower abdominal discomfort.    Activity:   Assist of 1 with walker and gait-belt. Patient can be unsteady on feet at times and inattention to movements.    Skin: Incisions    Pain:   Soreness and achy pain. PRN oxy & scheduled tylenol given    Neuro/CMS:   A&Ox4  Bilateral lower extremities numbness    Dressing(s):   Gauze & Tegaderm - CDI   Primapore on lumbar region - CDI    Diet:   Regular tolerating    LDA:   PIV SL    Equipment:   Call light, walker, gait-belt, bed alarm    Plan:   Continue to monitor pain and observation goals    Patient would like someone from Dr. Morse's team to talk to him about how his surgery went and pain anticipation duration. - messaged passed to oncoming RN.          Additional Info:   [id number]  Outpatient goals:      Vital signs and mental status at baseline : Met     Oral intake tolerated (at least 100 mLs fluid PO, at least 25% of a snack or meal, and oral intake on at least two separate occasions): Met     Able to ambulate at least 30 feet: Pending     Must void prior to discharge unless Gaines inserted per Neurosurgery Bladder Management Algorithm: Met    Adequate pain control using oral analgesics: Met     Hypercapnia, hypoventilation or hypoxia resolved for at least 2 hours without supplemental oxygen: Met     Deficits in sensation, mobility or coordination have resolved if spinal or regional anesthesia was used: Pending      [id number]    Vital signs and mental status at baseline : Met     Oral intake tolerated (at least 100 mLs fluid PO, at least 25% of a snack or meal, and oral intake on at least two separate occasions): Met     Able to ambulate at least 30 feet: Pending     Must void prior " to discharge unless Gaines inserted per Neurosurgery Bladder Management Algorithm: Met    Adequate pain control using oral analgesics: Met     Hypercapnia, hypoventilation or hypoxia resolved for at least 2 hours without supplemental oxygen: Met     Deficits in sensation, mobility or coordination have resolved if spinal or regional anesthesia was used: Pending

## 2021-11-03 NOTE — PROGRESS NOTES
[facility]    Medicine Progress Note - Hospitalist Service       Date of Admission:  11/1/2021    Assessment & Plan           Guevara Jones is an 80 y/o gentleman w/ h/o HTN, HLD, BPH, CVA w/o residual deficit and lumbar spinal stenosis with neurogenic claudication.  On 11/1/21, he underwent elective open decompressive laminectomies with bilateral medial facetectomies and foraminotomies L2-3 and L3-4 and repair of small dural tear, mid dorsal L3-4 level.  Hospitalist service consulted for postop medical management.     S/p open decompressive laminectomies with bilateral medial facetectomies and foraminotomies L2-3 and L3-4 and repair of small dural tear, mid dorsal L3-4 level:   POD #2.  DVT prophylaxis and postop pain management per spine service.    HTN:   Fairly well controlled.  Not on any antihypertensive meds chronically.  BP was elevated in the immediate postop.  But has been under better control since.  Amlodipine was initiated and discontinued on POD #1.  Continue IV hydralazine as needed.     BPH:   PTA Flomax.    H/o CVA w/o residual deficit:   Neuro exam is non-focal.  Monitor.    OIC:   Pt is on adequate bowel regimen meds.      Diet:  Regular  Gaines Catheter: Not present  Central Lines: None  Code Status: Full Code    Disposition:  Pt is constipated.  He is on adequate bowel regimen meds already.  Will consider obtaining abd xray if no BM by tomorrow morning.  Probable discharge to home in am.    The patient's care was discussed with the Bedside Nurse, Care Coordinator/ and Patient.      Mindi De Santiago MD.   Hospitalist.  [phone], pager.    _______________________________________________________________    Interval History   No BM past 2 days.  He feels constipated and bloated.  BP better today.  He is no longer hypotensive.      4 system ROS reviewed .    Data reviewed today: I reviewed all medications, new labs and imaging results over  the last 24 hours.    Physical Exam   Vital Signs: Temp: 97.5  F (36.4  C) Temp src: Oral BP: (!) 148/72 Pulse: 57   Resp: 15 SpO2: 97 % O2 Device: None (Room air)    Weight: 203 lbs .7 oz  General: aao x 3, NAD.  HEENT:  NC/AT, PERRL, EOMI, neck supple, no thyromegaly, op clear, mmm.  CVS:  NL s 1 and s2, no m/r/g.  Lungs:  CTA B/L.   Abd:  Soft, + bs, NT, no rebound or gaurding, no fluid shift.  Ext:  No c/c.  Lymph:  No edema.  Neuro:  Nonfocal.  Musculoskeletal: No calf tenderness to palpation.    Skin:  No rash.  Psychiatry:  Mood and affect appropriate.  :  Deferred    Data   Recent Labs   Lab 11/01/21  1010   *

## 2021-11-03 NOTE — PLAN OF CARE
"      VS:   Blood pressure (!) 148/72, pulse 57, temperature 97.6  F (36.4  C), temperature source Oral, resp. rate 14, height 1.753 m (5' 9.02\"), weight 92.1 kg (203 lb 0.7 oz), SpO2 97 %.  On RA     Output: Voiding spontaneously in the BR, LBM 10/31, laxative offered.     Lungs: Clear, denied SOB,    Activity: Ax1, walker and GB, steady gait,    Skin: Intact except for spine incision.    Pain:   C/o abd discomfort and spine incision site pain, managed well with PRN oxy and icepack.    Neuro/CMS:   A & O x4, forgetful, baseline Numbness at BLE, no new complains of N/T.    Dressing(s):   Spine dressing CDI.    Diet:   Good appetite on R/T diet.    LDA:   None.    Equipment:   Call light, walker,    Plan:   Continue with POC,    Additional Info:   Daughter called for the discharge plan, Alexandra was paged.        "

## 2021-11-03 NOTE — PLAN OF CARE
"      VS:   /73 (BP Location: Left arm)   Pulse 69   Temp 98.6  F (37  C) (Oral)   Resp 16   Ht 1.753 m (5' 9.02\")   Wt 92.1 kg (203 lb 0.7 oz)   SpO2 95%   BMI 29.97 kg/m    RA   Output:   Patient is up to bathroom voiding without difficulty, rubin removed this AM  LBM prior to surgery passing gas   Activity:   Patient is up assist of 1 with walker and gait belt  Bed alarm on, patient A&Ox4 but did pull own drain earlier today   Skin: WDL ex. Incision on lower spine, old skin graft site to L shin   Pain:   Patient pain controlled with scheduled tylenol, and PRN oxycodone   Neuro/CMS:   A&Ox4, baseline numbness in bilateral lower extremities, strengths intact   Dressing(s):   Dressing on spine CDI   Diet:   Regular diet tolerating well   LDA:   PIV R hand, L forearm SL   Equipment:   IV pole, call light, personal belongings, walker, gait belt   Plan:   Continue to monitor, continue plan of care, continue to mobilize and control pain   Additional Info:   Patient oriented but did pull drain out earlier today so bed alarm is on  Rubin removed this AM      "

## 2021-11-03 NOTE — CONSULTS
Care Management Initial Consult    General Information  Assessment completed with: Patient and daughter by phone    Type of CM/SW Visit: Offer D/C Planning  Primary Care Provider verified and updated as needed: Yes   Readmission within the last 30 days:  no    Advance Care Planning: Advance Care Planning Reviewed: no concerns identified          Communication Assessment  Patient's communication style:   spoken language (English or Bilingual)    Hearing Difficulty or Deaf: yes   Wear Glasses or Blind: yes    Cognitive  Cognitive/Neuro/Behavioral: WDL     Living Environment:   People in home: alone     Current living Arrangements: house      Able to return to prior arrangements: yes  Living Arrangement Comments: daughter will stay with him)    Family/Social Support:  Care provided by: self  Provides care for:  No one  Description of Support System: Children- Supportive, Involved         Current Resources:   Patient receiving home care services: No  Community Resources: None  Equipment currently used at home: cane, straight, other (see comments), shower chair (4WW)  Supplies currently used at home:      Employment/Financial:  Employment Status:  (not discussed)     Financial Concerns: No concerns identified        Care Management Follow Up    Length of Stay (days): 0    Expected Discharge Date: 11/04/2021     Concerns to be Addressed:     Home care  Patient plan of care discussed at interdisciplinary rounds: Yes    Anticipated Discharge Disposition:  Home  Anticipated Discharge Services:   (RN/PT)  Ph: [phone], fx: [phone]  Anticipated Discharge DME:  PT will issue    Patient/family educated on Medicare website which has current facility and service quality ratings:  Yes, but only one home care in area  Education Provided on the Discharge Plan:  yes  Patient/Family in Agreement with the Plan:  Yes    Referrals Placed by CM/SW:  Home care  Private pay costs discussed: Not  applicable    Additional Information:  Per MD team patient will discharge tomorrow. PT recs home care.     Spoke with patient by phone and he would like HH PT and is okay with referral to [facility] (only agency in area). His daughter will pick him up for the 2.5 hour drive and is staying with him. Pt gave permission to update daughter on plan    Spoke with Rema from [facility]. They can accept patient. They want RN added to order. RN can see pt on 11/5.    Spoke with daughterCara to update on plans. She is staying in a hotel in [facility] and will  pt tomorrow. Between Cara, the pts son and MELBA - they will help him at home. They all work as well.        Ashley Dwyer, RN, [place]  Float Care Coordinator  Covering [facility]   Pager: [phone]

## 2021-11-03 NOTE — PLAN OF CARE
VS:    Temp: 97.5  F (36.4  C) Temp src: Oral BP: (!) 148/72 Pulse: 57   Resp: 15 SpO2: 97 % O2 Device: None (Room air)    Output:    Voiding spontaneously. No BM per shift.  Right lower abdominal discomfort. Bowel sounds active.   Enema offered per order without result.  PRN milk of magnesium offered at 1355 and had small stool at  1445.   Activity:    Ax1 w/ walker and GB. Work w/ therapy this morning through hallway and stairs.   Skin: Warm and intact except spine incision   Pain:    Abd pain and discomfort. PRN oxy and icepack.   Neuro/CMS:    A&Ox4  Numbness of BLE   Dressing(s):    Spine dressing CDI    Diet:    Regular diet, good appetite   LDA:    None.   Equipment:    Call light, walker, gait-belt, bed alarm    Plan:  Monitor pain and contiune with POC.  Pt wants to  Take a shower this PM.   Plan to discharge home tomorrow.

## 2021-11-03 NOTE — UTILIZATION REVIEW
"Admission Status; Secondary Review Determination     Under the authority of the Utilization Management Committee, the utilization review process indicated a secondary review on the above patient. The review outcome is based on review of the medical records, discussions with staff, and applying clinical experience noted on the date of the review.     () Inpatient Status Appropriate - This patient's medical care is consistent with medical management for inpatient care and reasonable inpatient medical practice.   () Observation Status Appropriate - This patient does not meet hospital inpatient criteria and is placed in observation status. If this patient's primary payer is Medicare and was admitted as an inpatient, Condition Code 44 should be used and patient status changed to \"observation\".   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  On 11/1/21, he underwent elective open decompressive laminectomies with bilateral medial facetectomies and foraminotomies L2-3 and L3-4 and repair of small dural tear, mid dorsal L3-4 level.  Currently pain is being controlled with oral pain medications.  Orthopedics notes reviewed today and indicated discharge anticipated today but it appears patient now may remain in the hospital overnight for concern about constipation with discharge planned in AM.      For now would continue outpatient status.      The severity of illness, intensity of service provided, expected LOS and risk for adverse outcome make the care complex, high risk and appropriate for hospital admission.   The information on this document is developed by the utilization review team in order for the business office to ensure compliance. This only denotes the appropriateness of proper admission status and does not reflect the quality of care rendered.   The definitions of Inpatient " Status and Observation Status used in making the determination above are those provided in the CMS Coverage Manual, Chapter 1 and Chapter 6, section 70.4.     Sincerely,     Devang Mckngiht MD  Utilization Review   Physician Advisor   [facility]

## 2021-11-03 NOTE — PLAN OF CARE
"      VS: /58 (BP Location: Right arm)   Pulse 70   Temp 99.1  F (37.3  C) (Oral)   Resp 14   Ht 1.753 m (5' 9.02\")   Wt 92.1 kg (203 lb 0.7 oz)   SpO2 92%   BMI 29.97 kg/m       Output: Voiding without difficulty, LBM 11/3/21 (2 small BMS, 1 medium) -- pt received enema, milk of mag, and senna today, reports relief.   Lungs: Lung sounds clear, room air, denies SOB.   Activity: Assist of 1 with walker and gait belt.    Skin: Intact ex incision on back.   Pain:   Pain in back managed with scheduled tylenol, prn oxycodone, prn robaxin.   Neuro/CMS:   A&Ox4 but appears to have intermittent confusion -- bed alarm on for pt safety. CMS intact ex baseline numbness in BLE.   Dressing(s):   Dressings on back C/D/I.   Diet:   Regular diet, tolerating well.   LDA:   None.   Equipment:   Gait belt, walker, bed alarm.   Plan:   Continue to manage pain, continue POC.   Additional Info:   Pt's son, William, would like to be called and updated by ortho team tomorrow morning 11/4 -- phone number is [phone]. Call light within reach but pt needs continual reminding to use call light to make needs known.       "

## 2021-11-04 ENCOUNTER — APPOINTMENT (OUTPATIENT)
Dept: PHYSICAL THERAPY | Facility: CLINIC | Age: 81
End: 2021-11-04
Attending: ORTHOPAEDIC SURGERY
Payer: COMMERCIAL

## 2021-11-04 PROCEDURE — 99207 PR CDG-CODE CATEGORY CHANGED: CPT | Performed by: INTERNAL MEDICINE

## 2021-11-04 PROCEDURE — 97530 THERAPEUTIC ACTIVITIES: CPT | Mod: GP

## 2021-11-04 PROCEDURE — 250N000013 HC RX MED GY IP 250 OP 250 PS 637: Performed by: INTERNAL MEDICINE

## 2021-11-04 PROCEDURE — 99212 OFFICE O/P EST SF 10 MIN: CPT | Performed by: INTERNAL MEDICINE

## 2021-11-04 PROCEDURE — 250N000013 HC RX MED GY IP 250 OP 250 PS 637: Performed by: PHYSICIAN ASSISTANT

## 2021-11-04 RX ORDER — ASPIRIN 325 MG
325 TABLET ORAL EVERY MORNING
COMMUNITY
Start: 2021-11-10

## 2021-11-04 RX ORDER — AMOXICILLIN 250 MG
1 CAPSULE ORAL DAILY
Qty: 30 TABLET | Refills: 0 | DISCHARGE
Start: 2021-11-04

## 2021-11-04 RX ORDER — OXYCODONE HYDROCHLORIDE 5 MG/1
5-10 TABLET ORAL EVERY 4 HOURS PRN
Qty: 35 TABLET | Refills: 0 | Status: SHIPPED | OUTPATIENT
Start: 2021-11-04

## 2021-11-04 RX ORDER — ACETAMINOPHEN 325 MG/1
650 TABLET ORAL EVERY 4 HOURS PRN
Qty: 60 TABLET | Refills: 0 | DISCHARGE
Start: 2021-11-04

## 2021-11-04 RX ORDER — METHOCARBAMOL 750 MG/1
750 TABLET, FILM COATED ORAL EVERY 6 HOURS PRN
Qty: 60 TABLET | Refills: 0 | DISCHARGE
Start: 2021-11-04 | End: 2021-11-24

## 2021-11-04 RX ORDER — HYDROXYZINE HYDROCHLORIDE 10 MG/1
10 TABLET, FILM COATED ORAL EVERY 6 HOURS PRN
Qty: 30 TABLET | Refills: 0 | DISCHARGE
Start: 2021-11-04

## 2021-11-04 RX ADMIN — OXYCODONE HYDROCHLORIDE 5 MG: 5 TABLET ORAL at 14:51

## 2021-11-04 RX ADMIN — POLYETHYLENE GLYCOL 3350 17 G: 17 POWDER, FOR SOLUTION ORAL at 08:39

## 2021-11-04 RX ADMIN — FAMOTIDINE 20 MG: 20 TABLET ORAL at 08:38

## 2021-11-04 RX ADMIN — GABAPENTIN 600 MG: 600 TABLET, FILM COATED ORAL at 08:38

## 2021-11-04 RX ADMIN — OXYCODONE HYDROCHLORIDE 5 MG: 5 TABLET ORAL at 10:18

## 2021-11-04 RX ADMIN — ACETAMINOPHEN 650 MG: 325 TABLET, FILM COATED ORAL at 16:48

## 2021-11-04 RX ADMIN — MINERAL OIL, PETROLATUM, PHENYLEPHRINE HCL: 14; 74.9; .25 OINTMENT RECTAL at 03:55

## 2021-11-04 RX ADMIN — DOCUSATE SODIUM AND SENNOSIDES 1 TABLET: 8.6; 5 TABLET ORAL at 08:39

## 2021-11-04 RX ADMIN — METHOCARBAMOL 500 MG: 500 TABLET ORAL at 13:45

## 2021-11-04 RX ADMIN — METHOCARBAMOL 500 MG: 500 TABLET ORAL at 20:35

## 2021-11-04 RX ADMIN — ACETAMINOPHEN 975 MG: 325 TABLET, FILM COATED ORAL at 08:37

## 2021-11-04 RX ADMIN — DOCUSATE SODIUM AND SENNOSIDES 1 TABLET: 8.6; 5 TABLET ORAL at 19:26

## 2021-11-04 RX ADMIN — HYDROXYZINE HYDROCHLORIDE 10 MG: 10 TABLET ORAL at 01:01

## 2021-11-04 RX ADMIN — FAMOTIDINE 20 MG: 20 TABLET ORAL at 19:26

## 2021-11-04 RX ADMIN — OXYCODONE HYDROCHLORIDE 5 MG: 5 TABLET ORAL at 19:27

## 2021-11-04 RX ADMIN — OXYCODONE HYDROCHLORIDE 5 MG: 5 TABLET ORAL at 05:01

## 2021-11-04 RX ADMIN — TAMSULOSIN HYDROCHLORIDE 0.4 MG: 0.4 CAPSULE ORAL at 19:26

## 2021-11-04 RX ADMIN — GABAPENTIN 600 MG: 600 TABLET, FILM COATED ORAL at 19:26

## 2021-11-04 RX ADMIN — ACETAMINOPHEN 975 MG: 325 TABLET, FILM COATED ORAL at 01:02

## 2021-11-04 RX ADMIN — BISACODYL 10 MG: 10 SUPPOSITORY RECTAL at 17:41

## 2021-11-04 NOTE — PROGRESS NOTES
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  On 11/1/21, he underwent elective open decompressive laminectomies with bilateral medial facetectomies and foraminotomies L2-3 and L3-4 and repair of small dural tear, mid dorsal L3-4 level.  Hospitalist service consulted for postop medical management.     S/p open decompressive laminectomies with bilateral medial facetectomies and foraminotomies L2-3 and L3-4 and repair of small dural tear, mid dorsal L3-4 level:   POD #3.  DVT prophylaxis and postop pain management per spine service.    HTN:   Well controlled w/o meds.     BPH:   PTA Flomax.    H/o CVA w/o residual deficit:   Neuro exam is non-focal.  Monitor.    OIC:   Treated w/ bowel regimen meds w/ complete resolution.      Diet:  Regular  Gaines Catheter: Not present  Central Lines: None  Code Status: Full Code    Disposition:  Medically stable for discharge to home today.    The patient's care was discussed with the Bedside Nurse, Care Coordinator/ and Patient.      Mindi De Santiago MD.   Hospitalist.  [phone], pager.    _______________________________________________________________    Interval History   No complaints.  Had BMs.  BP under good control w/o meds.    4 system ROS reviewed .    Data reviewed today: I reviewed all medications, new labs and imaging results over the last 24 hours.    Physical Exam   Vital Signs: Temp: 97.4  F (36.3  C) Temp src: Oral BP: 131/68 Pulse: 77   Resp: 16 SpO2: 92 % O2 Device: None (Room air)    Weight: 203 lbs .7 oz  General: aao x 3, NAD.  HEENT:  NC/AT, PERRL, EOMI, neck supple, no thyromegaly, op clear, mmm.  CVS:  NL s 1 and s2, no m/r/g.  Lungs:  CTA B/L.   Abd:  Soft, + bs, NT, no rebound  or gaurding, no fluid shift.  Ext:  No c/c.  Lymph:  No edema.  Neuro:  Nonfocal.  Musculoskeletal: No calf tenderness to palpation.    Skin:  No rash.  Psychiatry:  Mood and affect appropriate.  :  Deferred    Data   Recent Labs   Lab 11/01/21  1010   *

## 2021-11-04 NOTE — PLAN OF CARE
"  VS:   /68 (BP Location: Left arm, Patient Position: Sitting)   Pulse 78   Temp 98.2  F (36.8  C) (Oral)   Resp 15   Ht 1.753 m (5' 9.02\")   Wt 92.1 kg (203 lb 0.7 oz)   SpO2 92%   BMI 29.97 kg/m    RA    Output:   Voids spontaneously  Small BM 11/04/21     Patient verbalized hx of hemorrhoids when RN notice blood on anus. MD paged. Preparation H ointment ordered.       Activity:   Assist of 1 with walker and gait-belt.   Bed alarm on   Skin: Incision    Pain:   Sore & achy pain around incision. PRN oxy 5 mg , and atarax given per shift.     Neuro/CMS:   A&O to place, situation, location. Disoriented to time. Patient would \"joke\" about location but when repeatedly asked patient eventually answer.      Patient did have episodes of confusion and seemed off a bit. Took time to reorient patient back.    Dressing(s):   CDI    Diet:   Regular    LDA:   None    Equipment:   Bed alarm, walker, gait-belt.    Plan:   Continue to monitor orientation and pain   Additional Info:   Patient son Jose) called around 2 am worried about patient discharging home. Per son, patient is unsafe to go home at the moment because patient's daughter will only intown to help patient until Saturday. Patient is unsteady at the moment. Patient can be noncompliance with outpatient PT.        "

## 2021-11-04 NOTE — CONSULTS
Care Management Follow Up    Length of Stay (days): 0    Expected Discharge Date: 11/04/2021       Concerns to be Addressed:    Patient plan of care discussed at interdisciplinary rounds: Yes     Anticipated Discharge Disposition:  Home  Anticipated Discharge Services:  [facility] (RN/PT)  Ph: [phone], fx: [phone]  Anticipated Discharge DME:  PT will issue 4WW     Additional Information:  Per MD team patient will discharge today. Call from brother, Elie at 2AM today concerned that patient needs rehab.     Spoke at length yesterday with pt daughter, Cara who will be staying with patient through the weekend. She is picking him up today. Pt also has a son and DIL that live down the road and will check in on patient. Pt is also set up with home care.     Called Cara who will come and  patient within the next hour. She is getting groceries for her dad. She lives 8 hours away and will be here through the weekend. A different brother (other than Elie) is the one that lives down the road. Elie lives 45 minutes away from pt and per Cara has his own health issues. He also sleeps all day long and is up at night (hence the 2AM call). He wouldn't be helping with the pt anyway. It was not a part of the plan per Cara. Cara didn't think that Elie needed to be called back. She is fine with picking up her dad today.     1PM Addendum:  PT rec now changed to TCU. Recs went to slightly below baseline as of yesterday to  very unsafe to navigate stairs.  SW aware.          Ashley Dwyer, RN, [place]  Float Care Coordinator  Covering 8A [facility]   Pager: [phone]

## 2021-11-04 NOTE — PROGRESS NOTES
"Orthopedic Surgery Progress Note    Assessment and Plan:   Guevara Jones is a 81 year old male with PMH including hx CVA, HTN, dyslipidemia, GERD, BPH now s/p L2-L4 laminectomies w/ repair of small dural tear on 11/1/21 with Dr. Manriquez.     Goal for 11/4 DC home     [facility], Medicine comanaging, appreciate cares  Activity:   - Up with assist until independent. No excessive bending or twisting. No lifting >10 lbs x 6 weeks.   Weight bearing status: WBAT.  Pain management: PO narcotics as tolerated.   Antibiotics: Ancef intra-op  Diet: Begin with clear fluids and progress diet as tolerated.   DVT prophylaxis: SCDs only. No chemical DVT ppx needed.  Imaging: none  Labs: none  Bracing/Splinting: None.  Dressings: Keep dressing c/d/i x 2 days, okay to change PRN thereafter.  Drains: HV superficial fell out 11/2  Gaines catheter: not used.  Physical Therapy/Occupational Therapy: Eval and treat.  Cultures: none.    Follow-up: Clinic with Dr. Manriquez in 6 weeks with repeat x-rays.   Disposition: Pending progress with therapies, pain control on orals, and medical stability, anticipate discharge to home on POD #1.    Garrison Barrientos MD  Orthopaedic Surgery, PGY-4  Pager: [phone]    ===================================================    Subjective:   NAEO. Pain controlled. Stayed overnight so they could set up home PT. Going home today.    Exam:  /68 (BP Location: Left arm, Patient Position: Sitting)   Pulse 78   Temp 98.2  F (36.8  C) (Oral)   Resp 15   Ht 1.753 m (5' 9.02\")   Wt 92.1 kg (203 lb 0.7 oz)   SpO2 92%   BMI 29.97 kg/m    Gen: Awake, alert, NAD  Resp: non-labored breathing  CV: Extr wwp  MSK: Dressing c/d/i.   DP and PT 2+. SILT L3-S1 bilaterally.   L2-3: Hip flexion L and R 5/5 strength    L4:  Knee extension L and R 5/5 strength   L5:  Foot / EHL dorsiflexion L and R 5/5 strength   S1:  Plantarflexion  L and R 5/5 strength    Drain: fell out 11/2    Labs:  Recent Labs   Lab Test " 11/01/21  1010 10/11/21  1248   HGB  --  13.7   WBC  --  9.8   PLT  --  244   CR  --  0.89   * 104*       Imaging:  None needed

## 2021-11-04 NOTE — PLAN OF CARE
VS:   Temp: 97.4  F (36.3  C) Temp src: Oral BP: 131/68 Pulse: 77   Resp: 16 SpO2: 92 % O2 Device: None (Room air)    Patient denies chest pain and SOB   Output:   Patient voids spontaneously without difficulty. Voided X3 today. LBM 11/4/21.    Activity:   Assist of 1 with walker/gait belt. Patient slightly unsteady at baseline. Needs walker to go home with.    Skin: Incision on posterior spine all other skin intact.    Pain:   Incisional pain managed with PRN oxycodone, Robaxin, and scheduled tylenol.   Some abdominal cramping - resolved with use of heat packs.    Neuro/CMS:   Patient answered orientation questions appropriately this morning. History of confusion. Orientated throughout shift today.  Baseline N&T in R foot.    Dressing(s): Posterior spinal dressing C/D/I. [place].    Diet: Regular diet tolerated well with no nausea.    LDA: No IV access.    Equipment: Walker/gait belt. Bed alarm on.    Plan:   Patient was supposed to discharge today but was not cleared by PT and new plan is for patient to go to TCU.    Additional Info:   Patient has call light within reach and is able to make needs known. Bed alarm on for safety.

## 2021-11-04 NOTE — PROGRESS NOTES
Care Management Follow Up    Length of Stay (days): 0    Expected Discharge Date: 11/04/2021     Concerns to be Addressed:       Patient plan of care discussed at interdisciplinary rounds: Yes    Anticipated Discharge Disposition: TCU      Anticipated Discharge Services:    Anticipated Discharge DME:      Patient/family educated on Medicare website which has current facility and service quality ratings:    Education Provided on the Discharge Plan:    Patient/Family in Agreement with the Plan:      Referrals Placed by CM/SW:    Private pay costs discussed: Not applicable    Additional Information:  Per PT recommendation today is for TCU. OLAYINKA met with pt and daughter, Cara, at bedside. Discussed that the recommendation for discharge is a TCU and explained to pt and Cara what a TCU is. Pt stated that he is sort of agreeable with the plan but would only like to stay at a TCU for 5 days. SW stated that it recommended that pt get stronger at the TCU before going home so that depends on his progress. OLAYINKA stated that she can give pt and Cara a Medicare list of facilities in the [facility] where pt lives to review and if pt is agreeable, pick 3-5 facilities they would like referrals to. Cara stated that she would like a list. Cara stated that she, pt and her brother will have a conference call to discuss TCU. SW provided a Medicare list for Cara. Per bedside nurse, these are family preferences for TCU.    [address] [address] [phone]  SW left vm for admissions.  Olayinka received vm from admissions stating that they have no beds at this time.    Sent referral  [facility]  [address] [place] [address] [phone]  Fax [phone]    DIRK Lopez  8A   Ph [phone]  Pager [phone]

## 2021-11-05 ENCOUNTER — APPOINTMENT (OUTPATIENT)
Dept: PHYSICAL THERAPY | Facility: CLINIC | Age: 81
End: 2021-11-05
Attending: ORTHOPAEDIC SURGERY
Payer: COMMERCIAL

## 2021-11-05 VITALS
RESPIRATION RATE: 16 BRPM | HEART RATE: 74 BPM | TEMPERATURE: 97.5 F | WEIGHT: 203.04 LBS | OXYGEN SATURATION: 91 % | HEIGHT: 69 IN | SYSTOLIC BLOOD PRESSURE: 128 MMHG | DIASTOLIC BLOOD PRESSURE: 82 MMHG | BODY MASS INDEX: 30.07 KG/M2

## 2021-11-05 PROCEDURE — 99212 OFFICE O/P EST SF 10 MIN: CPT | Performed by: INTERNAL MEDICINE

## 2021-11-05 PROCEDURE — 99207 PR CDG-CODE CATEGORY CHANGED: CPT | Performed by: INTERNAL MEDICINE

## 2021-11-05 PROCEDURE — 250N000013 HC RX MED GY IP 250 OP 250 PS 637: Performed by: PHYSICIAN ASSISTANT

## 2021-11-05 PROCEDURE — 97116 GAIT TRAINING THERAPY: CPT | Mod: GP

## 2021-11-05 PROCEDURE — 97530 THERAPEUTIC ACTIVITIES: CPT | Mod: GP

## 2021-11-05 RX ADMIN — METHOCARBAMOL 500 MG: 500 TABLET ORAL at 03:18

## 2021-11-05 RX ADMIN — OXYCODONE HYDROCHLORIDE 5 MG: 5 TABLET ORAL at 05:59

## 2021-11-05 RX ADMIN — DOCUSATE SODIUM AND SENNOSIDES 1 TABLET: 8.6; 5 TABLET ORAL at 08:18

## 2021-11-05 RX ADMIN — FAMOTIDINE 20 MG: 20 TABLET ORAL at 08:18

## 2021-11-05 RX ADMIN — GABAPENTIN 600 MG: 600 TABLET, FILM COATED ORAL at 08:18

## 2021-11-05 RX ADMIN — POLYETHYLENE GLYCOL 3350 17 G: 17 POWDER, FOR SOLUTION ORAL at 08:20

## 2021-11-05 RX ADMIN — OXYCODONE HYDROCHLORIDE 5 MG: 5 TABLET ORAL at 00:41

## 2021-11-05 RX ADMIN — OXYCODONE HYDROCHLORIDE 5 MG: 5 TABLET ORAL at 10:11

## 2021-11-05 RX ADMIN — METHOCARBAMOL 500 MG: 500 TABLET ORAL at 12:27

## 2021-11-05 NOTE — DISCHARGE INSTRUCTIONS
________________________________________________________________    Same-Day Surgery   Adult Discharge Orders & Instructions     For 24 hours after surgery:  1. Get plenty of rest.  A responsible adult must stay with you for at least 24 hours after you leave the hospital.   2. Pain medication can slow your reflexes. Do not drive or use heavy equipment.  If you have weakness or tingling, don't drive or use heavy equipment until this feeling goes away.  3. Mixing alcohol and pain medication can cause dizziness and slow your breathing. It can even be fatal. Do not drink alcohol while taking pain medication.  4. Avoid strenuous or risky activities.  Ask for help when climbing stairs.   5. You may feel lightheaded.  If so, sit for a few minutes before standing.  Have someone help you get up.   6. If you have nausea (feel sick to your stomach), drink only clear liquids such as apple juice, ginger ale, broth or 7-Up.  Rest may also help.  Be sure to drink enough fluids.  Move to a regular diet as you feel able. Take pain medications with a small amount of solid food, such as toast or crackers, to avoid nausea.   7. A slight fever is normal. Call the doctor if your fever is over 100 F (37.7 C) (taken under the tongue) or lasts longer than 24 hours.  8. You may have a dry mouth, muscle aches, trouble sleeping or a sore throat.  These symptoms should go away after 24 hours.  9. Do not make important or legal decisions.   Pain Management:      1. Take pain medication (if prescribed) for pain as directed by your physician.        2. WARNING: If the pain medication you have been prescribed contains Tylenol  (acetaminophen), DO NOT take additional doses of Tylenol (acetaminophen).     Call your doctor for any of the followin.  Signs of infection (fever, growing tenderness at the surgery site, severe pain, a large amount of drainage or bleeding, foul-smelling drainage, redness, swelling).    2.  It has been over 8 to 10 hours  since surgery and you are still not able to urinate (pee).    3.  Headache for over 24 hours.    4.  Numbness, tingling or weakness the day after surgery (if you had spinal anesthesia).  To contact a doctor, call  Dr. Manriquez, [facility], [phone]   or:      [phone] and ask for the Resident On Call for:          Orthopedics   (answered 24 hours a day)      Emergency Department:  [facility]: [phone]  [facility]: [phone]               Rev. 10/2014   Discharge Instructions After Laminectomy    Vertebrae are the bones that make up the spine. Laminectomy is a surgery that removes the part of the vertebrae called the lamina. This helps relieve pressure when the disk bulges into a nerve in the low back and helps reduce symptoms.   Discomfort:    You will likely feel weak and tired at first, but you should feel a little stronger each day    You incision may be sore. Take pain medication as directed    You may also feel some pain, tingling, or numbness in the back of your legs    Symptoms should decrease as your nerves heal.    Keep moving as much as you can without making your pain increase.    Ice surgical site every 15 minutes per hour for 24 hours.   Wound Care:    You may remove your dressing after 48 hours (as directed by your doctor)    May shower after dressing removal but avoid soaking in water until directed by your doctor.     If you have steri-strips, allow them to fall off on their own. This usually takes 7-10 days.     Check your incision daily for signs of infection (see below)  Activity:    Weight bear as tolerated after surgery    No excessive bending, twisting, or lifting more than 10 pounds until cleared by your orthopedic surgeon.     Walking is the best exercise for you after back surgery. It strengthens your back and leg muscles, increases your endurance and relieves stress. Begin by walking around the house. Build up to  several walks per day.     Increased pain for more than 2 hours after an activity means you have done to much to soon. When you feel pain, slow down and pay more attention to your posture and movements. By the sixth week after your surgery, you should be well on your way to healing. Continue to let pain be a warning to slow down.     Talk with your doctor about returning to work and other activities.  Call your doctor right away if you:    Develop new, persistent or severe pain, weakness, or numbness in your back and legs that does not improve with pain medication and rest.    Notice drainage, swelling, increased warmth or increased redness around your incision    Have a fever over 101 degrees, severe headache, or extreme tiredness    Have difficulty breathing    Have problems controlling your bowel or bladder    Have swelling and tenderness in your legs  Follow-up:    You should follow up with your surgeon in about 6 weeks unless otherwise specified. The doctor will check your incision to see how you are healing. Your medication and activity levels may also be adjusted. Be sure to ask any questions you have during this visit.

## 2021-11-05 NOTE — PLAN OF CARE
VS:   Temp: 97.5  F (36.4  C) Temp src: Oral BP: 128/82 Pulse: 74   Resp: 16 SpO2: 91 % O2 Device: None (Room air)    Patient denies chest pain and SOB   Output: Patient voids spontaneously without difficulty. LBM 11/4/21.   Activity: Assist of 1 with walker/gait belt.    Skin: Intact except for incision on posterior spine.    Pain: Pain managed with 5 mg oxycodone PRN, PRN robaxin.    Neuro/CMS:   Patient answers orientation questions appropriately. Forgetful and follows commands inconsistently. Frequent rounding completed and bed alarm on.  Baseline N&T in R foot no change.    Dressing(s): Spinal dressing C/D/I.    Diet: Regular diet. No nausea.    LDA: No IV access.   Equipment: Walker/gait belt.    Plan:   Patient to transferred to TCU at 1300. Writer called TCU to give report and was sent to voicemail. All paperwork was sent with patient. Patient left in wheelchair and was transported by daughter. Patient was sent with urinal to use in case of urgent urination on the way to TCU.   Additional Info: Patient and daughter verbalized understanding of the plan.

## 2021-11-05 NOTE — PROGRESS NOTES
Care Management Discharge Note    Discharge Date: 11/04/2021       Discharge Disposition:  TCU [facility]    Discharge Services:      Discharge DME:      Discharge Transportation:  family    Private pay costs discussed: Not applicable    PAS Confirmation Code:    [id number]          Patient/family educated on Medicare website which has current facility and service quality ratings:  yes    Education Provided on the Discharge Plan:  yes  Persons Notified of Discharge Plans: family, MD, charge RN, bedside RN, pt  Patient/Family in Agreement with the Plan:  yes  Handoff Referral Completed: Yes    Additional Information:  [address] [address]  [phone]  Fax [phone]    NICOLAS received vm from admissions at [facility] stating that they can take pt today. NICOLAS called daughter, Cara and discussed transportation. Cara stated that she would look at an outside company after NICOLAS discussed that wheelchair transportation may not be covered by insurance. NICOLAS stated that she will discuss with PT about pt needing transportation. From PT notes yesterday it appeared that pt may need wheelchair, NICOLAS called to let Cara know and she was agreeable to SW making transportation. SW made wheelchair ride through [facility] for 1:30 pm. After PT worked with pt today, she recommends that pt is safely able to transport by car with family. Pt did not want to go by medical ride. NICOLAS discussed the recommendation and that pt did not want to go by medical ride. Cara stated that she is ok with driving pt to the facility. SW cancelled the ride through [facility]. PAS was completed. NICOLAS left vm with admissions stating that pt will leave at 1:30 pm. NICOLAS sent discharge orders to facility.     DIRK Lopez  8A   Ph [phone]  Pager [phone]

## 2021-11-05 NOTE — PROGRESS NOTES
"Orthopedic Surgery Progress Note    Assessment and Plan:   Guevara Jones is a 81 year old male with PMH including hx CVA, HTN, dyslipidemia, GERD, BPH now s/p L2-L4 laminectomies w/ repair of small dural tear on 11/1/21 with Dr. Manriquez.     Goal for 11/5 Dispo planning, discharge to TCU if able to secure bed    [facility], Medicine comanaging, appreciate cares  Activity:   - Up with assist until independent. No excessive bending or twisting. No lifting >10 lbs x 6 weeks.  Weight bearing status: WBAT.  Pain management: PO narcotics as tolerated.  Antibiotics: Ancef intra-op  Diet: Begin with clear fluids and progress diet as tolerated.   DVT prophylaxis: SCDs only. No chemical DVT ppx needed.  Imaging: none  Labs: none  Bracing/Splinting: None.  Dressings: Keep dressing c/d/i x 2 days, okay to change PRN thereafter.  Drains: HV superficial fell out 11/2  Gaines catheter: not used.  Physical Therapy/Occupational Therapy: Eval and treat.  Cultures: none.    Follow-up: Clinic with Dr. Manriquez in 6 weeks with repeat x-rays.   Disposition: Pending progress with therapies, pain control on orals, and medical stability, anticipate discharge to TCU when bed available    Garrison Barrientos MD  Orthopaedic Surgery, PGY-4  Pager: [phone]    ===================================================    Subjective:   NAEO. Pain controlled. Therapy rec changed to TCU; SW working on setting up.    Exam:  /82 (BP Location: Left arm)   Pulse 74   Temp 97.5  F (36.4  C) (Oral)   Resp 16   Ht 1.753 m (5' 9.02\")   Wt 92.1 kg (203 lb 0.7 oz)   SpO2 91%   BMI 29.97 kg/m    Gen: Awake, alert, NAD  Resp: non-labored breathing  CV: Extr wwp  MSK: Dressing c/d/i.   DP and PT 2+. SILT L3-S1 bilaterally.   L2-3: Hip flexion L and R 5/5 strength    L4:  Knee extension L and R 5/5 strength   L5:  Foot / EHL dorsiflexion L and R 5/5 strength   S1:  Plantarflexion  L and R 5/5 strength    Drain: fell out 11/2    Labs:  Recent Labs "   Lab Test 11/01/21  1010 10/11/21  1248   HGB  --  13.7   WBC  --  9.8   PLT  --  244   CR  --  0.89   * 104*       Imaging:  None needed

## 2021-11-05 NOTE — PLAN OF CARE
Physical Therapy Discharge Summary    Reason for therapy discharge:    Discharged to transitional care facility.    Progress towards therapy goal(s). See goals on Care Plan in [facility] electronic health record for goal details.  Goals partially met.  Barriers to achieving goals:   limited tolerance for therapy and discharge from facility.    Therapy recommendation(s):    Continued therapy is recommended.  Rationale/Recommendations:  Recommend TCU to progress safety awareness and functional IND post spine surgery.

## 2021-11-05 NOTE — PLAN OF CARE
"      VS: /56 (BP Location: Left arm)   Pulse 79   Temp 98.1  F (36.7  C) (Oral)   Resp 16   Ht 1.753 m (5' 9.02\")   Wt 92.1 kg (203 lb 0.7 oz)   SpO2 92%   BMI 29.97 kg/m       Output: Up to bathroom several times throughout night, voiding spontaneously. LBM: 11/4   Lungs: Clear bilaterally, on RA. Denies chest pain or SOB.    Activity: SBA/Assist x1 w/ walker & GB. Ambulated to bathroom thru night. Generalized weakness. Repositions ind.    Skin: WDL ex incision   Pain:   Managed w/ PRN Oxy 5mg and Robaxin. Increased pain w/ ambulation but tolerable while laying in bed.    Neuro/CMS:   A & O x4. Intermittent confusion thru night, but increased this AM. CMS intact, ex baseline numbness in feet.    Dressing(s):   Spine drsg - CDI   Diet:   Regular    LDA:   No IV access   Equipment:   Walker, GB   Plan:   Manage pain and continue to encourage PT. Discharge to TCU when cleared by PT.    Additional Info:   Pt calling appropriately during night, but bed alarm on for safety.      "

## 2021-11-05 NOTE — PLAN OF CARE
"      VS:   /56 (BP Location: Left arm)   Pulse 79   Temp 98.1  F (36.7  C) (Oral)   Resp 16   Ht 1.753 m (5' 9.02\")   Wt 92.1 kg (203 lb 0.7 oz)   SpO2 92%   BMI 29.97 kg/m    RA   Output:   Patient voiding without difficulty  LBM 11/4/21 small suppository given this evening, abdomen distended minor pain   Activity:   Patient is up SBA with walker and gait belt   Skin: WDL ex. Spinal incision   Pain:   Patient pain controlled with PRN oxycodone, and PRN robaxin   Neuro/CMS:   A&Ox4, bilateral numbness in feet baseline, strengths intact, patient able to answer orientation question but can have small moments of confusion and does not always call appropriately bed alarm should be on   Dressing(s):   Dressing on spine CDI   Diet:   Regular diet tolerating well   LDA:   None   Equipment:   Commode, call light, personal belongings, walker, gait belt   Plan:   Once TCU is found discharge, continue plan of care   Additional Info:   Patient was supposed to discharge today everything was ready but PT recommended TCU before patient left now looking for TCU placement.      "

## 2021-11-05 NOTE — PROGRESS NOTES
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  On 11/1/21, he underwent elective open decompressive laminectomies with bilateral medial facetectomies and foraminotomies L2-3 and L3-4 and repair of small dural tear, mid dorsal L3-4 level.  Hospitalist service consulted for postop medical management.     S/p open decompressive laminectomies with bilateral medial facetectomies and foraminotomies L2-3 and L3-4 and repair of small dural tear, mid dorsal L3-4 level:   POD #4.  DVT prophylaxis and postop pain management per spine service.    HTN:   Well controlled w/o meds.     BPH:   PTA Flomax.    H/o CVA w/o residual deficit:   Neuro exam is non-focal.  Monitor.    OIC:   Treated w/ bowel regimen meds w/ complete resolution.  Continue senna-s.      Diet:  Regular  Gaines Catheter: Not present  Central Lines: None  Code Status: Full Code    Disposition:  Medically stable for discharge.    The patient's care was discussed with the Bedside Nurse, Care Coordinator/ and Patient.      Mindi De Santiago MD.   Hospitalist.  [phone], pager.    _______________________________________________________________    Interval History   No complaints.  Uneventful night.    4 system ROS reviewed .    Data reviewed today: I reviewed all medications, new labs and imaging results over the last 24 hours.    Physical Exam   Vital Signs: Temp: 97.5  F (36.4  C) Temp src: Oral BP: 128/82 Pulse: 74   Resp: 16 SpO2: 91 % O2 Device: None (Room air)    Weight: 203 lbs .7 oz  General: aao x 3, NAD.  HEENT:  NC/AT, PERRL, EOMI, neck supple, no thyromegaly, op clear, mmm.  CVS:  NL s 1 and s2, no m/r/g.  Lungs:  CTA B/L.   Abd:  Soft, + bs, NT, no rebound or gaurding, no  fluid shift.  Ext:  No c/c.  Lymph:  No edema.  Neuro:  Nonfocal.  Musculoskeletal: No calf tenderness to palpation.    Skin:  No rash.  Psychiatry:  Mood and affect appropriate.  :  Deferred    Data   Recent Labs   Lab 11/01/21  1010   *

## 2021-11-09 ENCOUNTER — DOCUMENTATION ONLY (OUTPATIENT)
Dept: OTHER | Facility: CLINIC | Age: 81
End: 2021-11-09
Payer: COMMERCIAL

## 2021-11-18 ENCOUNTER — TELEPHONE (OUTPATIENT)
Dept: ORTHOPEDICS | Facility: CLINIC | Age: 81
End: 2021-11-18
Payer: COMMERCIAL

## 2021-11-18 NOTE — TELEPHONE ENCOUNTER
[facility]    Phone Message    May a detailed message be left on voicemail: yes     Reason for Call: Other: Patient had surgery with Dr. Manriquez on 11/1/21.      His next appt is scheduled for 12/16/21, but patient has a piece of patient from [place]? Saying he has an appt on 11/24/21 with Dr. Manriquez.    I am not seeing any such appt for that day.    Please call patient to discuss.  Not sure if there was a miscommunication or no.    Wants to know if he can make his next appt into a virtual as it's a long drive to get to [place].    Action Taken: Message routed to:  [facility]    Travel Screening: Not Applicable

## 2021-11-19 NOTE — TELEPHONE ENCOUNTER
See phone message.  I called pt back today & told him he does not have a postop appt. In Nov. & I will check with  about whether he can have the Dec appt. Virtually due to travel & I will get back to him.  He agreed.    Betty Keller RN.

## 2021-11-24 DIAGNOSIS — M48.062 SPINAL STENOSIS OF LUMBAR REGION WITH NEUROGENIC CLAUDICATION: ICD-10-CM

## 2021-11-24 RX ORDER — HYDROCODONE BITARTRATE AND ACETAMINOPHEN 5; 325 MG/1; MG/1
1 TABLET ORAL EVERY 6 HOURS PRN
Qty: 28 TABLET | Refills: 0 | Status: SHIPPED | OUTPATIENT
Start: 2021-11-24

## 2021-11-24 RX ORDER — METHOCARBAMOL 750 MG/1
750 TABLET, FILM COATED ORAL EVERY 6 HOURS PRN
Qty: 60 TABLET | Refills: 1 | Status: SHIPPED | OUTPATIENT
Start: 2021-11-24

## 2021-11-24 NOTE — TELEPHONE ENCOUNTER
See 11-18-21 Triage note.    I called pt back  & told him  said he could do postop appt virtually due to distance to drive but pt stated he changed his mind & wants to keep in person appt.   Rates pain 5-6 & states ran out of Oxycodone & tried leftover Hydrocodone which helped.   Saw Primary who changed his Gabapentin to Lyrica due to swelling in feet.       stopped taking Tylenol due to GI upset.  Not taking the Atarax.   Ran out of Robaxin which he thinks was helping pain.    Having BMs & has plenty of Senna.   Refilled Hydrocodone & Robaxin.  Call back prn. Pt agreed.    S.O./Betty Keller RN.

## 2021-11-24 NOTE — TELEPHONE ENCOUNTER
PDMP Review       Value Time User    State PDMP site checked  Yes 11/24/2021 12:26 PM Alissa Giles PA-C Dana M. Swanson, PA-C

## 2021-12-09 DIAGNOSIS — M48.062 SPINAL STENOSIS OF LUMBAR REGION WITH NEUROGENIC CLAUDICATION: Primary | ICD-10-CM

## 2021-12-15 ENCOUNTER — TELEPHONE (OUTPATIENT)
Dept: ORTHOPEDICS | Facility: CLINIC | Age: 81
End: 2021-12-15
Payer: COMMERCIAL

## 2021-12-15 DIAGNOSIS — M48.062 SPINAL STENOSIS OF LUMBAR REGION WITH NEUROGENIC CLAUDICATION: Primary | ICD-10-CM

## 2021-12-15 NOTE — TELEPHONE ENCOUNTER
Called patient and explained to him that the Xray would be moved to the 20th of January for his appointment. Patient expressed understanding and thanked me for the call.

## 2021-12-15 NOTE — TELEPHONE ENCOUNTER
[facility]    Phone Message    May a detailed message be left on voicemail: yes     Reason for Call: Other: pt has appt with Dr. Manriquez on  and wants to do xrays same day. he was told the xray order will  before then and needs to be extended. please contact pt     Action Taken: Message routed to:  [facility] ortho    Travel Screening: Not Applicable

## 2022-01-26 ENCOUNTER — TELEPHONE (OUTPATIENT)
Dept: ORTHOPEDICS | Facility: CLINIC | Age: 82
End: 2022-01-26
Payer: COMMERCIAL

## 2022-01-26 NOTE — TELEPHONE ENCOUNTER
[facility]    Phone Message    May a detailed message be left on voicemail: yes     Reason for Call: Other: Patient would like to know if MRI was received. Please call him back      Action Taken: Message routed to:  [facility] ortho    Travel Screening: Not Applicable

## 2022-01-26 NOTE — TELEPHONE ENCOUNTER
Called patient back and confirmed that the MRI in question had in fact been received. Patient was relieved to hear this news and thanked the writer, patient had no further questions.    Fortunato Haile, EMT on 1/26/2022 at 1:42 PM

## 2022-01-27 ENCOUNTER — ANCILLARY PROCEDURE (OUTPATIENT)
Dept: GENERAL RADIOLOGY | Facility: CLINIC | Age: 82
End: 2022-01-27
Attending: ORTHOPAEDIC SURGERY
Payer: COMMERCIAL

## 2022-01-27 ENCOUNTER — OFFICE VISIT (OUTPATIENT)
Dept: ORTHOPEDICS | Facility: CLINIC | Age: 82
End: 2022-01-27
Payer: COMMERCIAL

## 2022-01-27 DIAGNOSIS — M48.062 LUMBAR STENOSIS WITH NEUROGENIC CLAUDICATION: ICD-10-CM

## 2022-01-27 DIAGNOSIS — M48.062 SPINAL STENOSIS OF LUMBAR REGION WITH NEUROGENIC CLAUDICATION: ICD-10-CM

## 2022-01-27 DIAGNOSIS — Z98.890 S/P SPINAL SURGERY: Primary | ICD-10-CM

## 2022-01-27 PROCEDURE — 99024 POSTOP FOLLOW-UP VISIT: CPT | Performed by: ORTHOPAEDIC SURGERY

## 2022-01-27 PROCEDURE — 72082 X-RAY EXAM ENTIRE SPI 2/3 VW: CPT | Performed by: STUDENT IN AN ORGANIZED HEALTH CARE EDUCATION/TRAINING PROGRAM

## 2022-01-27 RX ORDER — PREGABALIN 75 MG/1
CAPSULE ORAL
COMMUNITY
Start: 2021-12-26

## 2022-01-27 NOTE — PROGRESS NOTES
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      In-Person Visit    Chief Complaint   Patient presents with     Surgical Followup     DOS 11/1/21 S/P Open laminectomies lumbar 2-4, Repair of Dural Tear.         S>  82 year old male, almost 3 mos postop; 1st postop visit.  He may have missed one appointment, and we also rescheduled one of his appointments.  At any rate this is his first postop visit.    Unaccompanied.  Per patient, while there may have been some benefit from the surgery, overall he is doing worse compared to preoperative.  He can walk/stand less than before, and his overall function is poorer.     Tried doing Postop PT at [place].  Did 3 sessions, but had to stop because it was causing him more pain.    (-) opioids.  Takes tylenol.      Oswestry (CHRISTIAN) Questionnaire    OSWESTRY DISABILITY INDEX 1/27/2022   Count 9   Sum 25   Oswestry Score (%) 55.56      Preop CHRISTIAN 55.56%  3 mos  55.56%         PROMIS-10 Scores  Global Mental Health Score: 9  Global Physical Health Score: 10  PROMIS TOTAL - SUBSCORES: 19    O>   Alert, oriented x 3, cooperative.  Not in CP distress.  There were no vitals taken for this visit.  Surgical incision(s) well-healed, no sign of infection.  Ambulates independently.  Grossly neurologically intact.    Imaging:    EOS full spine AP lateral standing x-rays taken today show evidence of two-level laminectomy L2-L4.  No evidence of development of interval instability or deformity compared to preoperative x-rays.    A>   3 months status post two-level laminectomy L2-L4 for spinal stenosis, with overall worse than function compared to preoperative.    P>    Had a good discussion with patient.  He is quite unhappy with his overall outcome.  He tried physical therapy, but had to quit after 3 sessions because it was making his symptoms worse.  He is now off opioid  medication, but is able to stand and walk less compared to preoperative.  He is quite unhappy with his current state, and would like further work-up and treatment if possible.  Given this, I recommend further work-up in the form of advanced imaging, both MRI and CT scan.    - Lumbar MRI and CT.    Virtual RTC after above, to review and discuss further options.      Shaka Manriquez MD    Orthopaedic Spine Surgery  Dept Orthopaedic Surgery, formerly [facility]  [phone] office, [phone] pager  [email or web address]

## 2022-01-27 NOTE — LETTER
1/27/2022         RE: Guevara Jones  [address]  [place] [address]        Dear Colleague,    Thank you for referring your patient, Guevara Jones, to the [facility] [place]. Please see a copy of my visit note below.    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      In-Person Visit    Chief Complaint   Patient presents with     Surgical Followup     DOS 11/1/21 S/P Open laminectomies lumbar 2-4, Repair of Dural Tear.         S>  82 year old male, almost 3 mos postop; 1st postop visit.  He may have missed one appointment, and we also rescheduled one of his appointments.  At any rate this is his first postop visit.    Unaccompanied.  Per patient, while there may have been some benefit from the surgery, overall he is doing worse compared to preoperative.  He can walk/stand less than before, and his overall function is poorer.     Tried doing Postop PT at [place].  Did 3 sessions, but had to stop because it was causing him more pain.    (-) opioids.  Takes tylenol.      Oswestry (CHRISTIAN) Questionnaire    OSWESTRY DISABILITY INDEX 1/27/2022   Count 9   Sum 25   Oswestry Score (%) 55.56      Preop CHRISTIAN 55.56%  3 mos  55.56%         PROMIS-10 Scores  Global Mental Health Score: 9  Global Physical Health Score: 10  PROMIS TOTAL - SUBSCORES: 19    O>   Alert, oriented x 3, cooperative.  Not in CP distress.  There were no vitals taken for this visit.  Surgical incision(s) well-healed, no sign of infection.  Ambulates independently.  Grossly neurologically intact.    Imaging:    EOS full spine AP lateral standing x-rays taken today show evidence of two-level laminectomy L2-L4.  No evidence of development of interval instability or deformity compared to preoperative x-rays.    A>   3 months status post two-level laminectomy L2-L4 for spinal stenosis, with  overall worse than function compared to preoperative.    P>    Had a good discussion with patient.  He is quite unhappy with his overall outcome.  He tried physical therapy, but had to quit after 3 sessions because it was making his symptoms worse.  He is now off opioid medication, but is able to stand and walk less compared to preoperative.  He is quite unhappy with his current state, and would like further work-up and treatment if possible.  Given this, I recommend further work-up in the form of advanced imaging, both MRI and CT scan.    - Lumbar MRI and CT.    Virtual RTC after above, to review and discuss further options.      Shaka Manriquez MD    Orthopaedic Spine Surgery  Dept Orthopaedic Surgery, [facility]  [phone] office, [phone] pager  [email or web address]

## 2022-01-28 ENCOUNTER — TELEPHONE (OUTPATIENT)
Dept: ORTHOPEDICS | Facility: CLINIC | Age: 82
End: 2022-01-28
Payer: COMMERCIAL

## 2022-01-28 NOTE — TELEPHONE ENCOUNTER
[facility]    Phone Message    May a detailed message be left on voicemail: yes     Reason for Call: Other: Patient said that the fax number for [employer] is probably incorrect. He's calling back to give the correct #s. Please fax MRI order to [facility] phone#: [phone] fax#: [phone].      Action Taken: Message routed to:  [facility] Orthopedics    Travel Screening: Not Applicable

## 2022-02-08 ENCOUNTER — VIRTUAL VISIT (OUTPATIENT)
Dept: ORTHOPEDICS | Facility: CLINIC | Age: 82
End: 2022-02-08
Payer: COMMERCIAL

## 2022-02-08 DIAGNOSIS — M48.062 LUMBAR STENOSIS WITH NEUROGENIC CLAUDICATION: ICD-10-CM

## 2022-02-08 DIAGNOSIS — M54.16 LUMBAR RADICULAR PAIN: Primary | ICD-10-CM

## 2022-02-08 PROCEDURE — 99214 OFFICE O/P EST MOD 30 MIN: CPT | Mod: TEL | Performed by: ORTHOPAEDIC SURGERY

## 2022-02-08 NOTE — PROGRESS NOTES
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      VIRTUAL VISIT:  Patient is evaluated today via billable virtual visit.    The patient has been notified of following:   \"This virtual visit will be conducted via a call between you and your physician/provider. We have found that certain health care needs can be provided without the need for an in-person physical exam.  This service lets us provide the care you need with a virtual conversation.  If a prescription is necessary we can send it directly to your pharmacy.  If lab work is needed we can place an order for that and you can then stop by our lab to have the test done at a later time.  If during the course of the call the physician/provider feels a virtual visit is not appropriate, you will not be charged for this service.\"     Physician has received verbal consent for a Virtual Visit from the patient.  Platform used:  Recurve Video  Time:  3:11pm to 3:37pm  Originating Location (pt. Location): Home  Distant Location (provider location):  [facility]     Chief Complaint   Patient presents with     RECHECK     F/U MRI and CT.        Last Visit Date: 1/27/22  Previous Impression:  3 months status post two-level laminectomy L2-L4 for spinal stenosis, with overall worse than function compared to preoperative.  Previous Plan:  - Lumbar MRI and CT.  Virtual RTC after above, to review and discuss further options.      S>  82 year old male, here to review MRI and CT, and discuss further options.    Patient feels he is in decline.  Patient helped a little in terms of alleviating the nerve pains in his legs.  However, overall things are worse.    I asked him to describe his symptoms.  He started by saying sacroiliac pain, but could not tell if right or left.  Says pain is more below the beltline, across but mostly on " the right.  This pain was also already present even before the surgery.    Back 50%, legs 50%.  R>L.  Says the pain does go down to his foot.  However, he cannot really tell for sure, as he also has had previous foot surgeries (For bunions).      Oswestry (CHRISTIAN) Questionnaire    OSWESTRY DISABILITY INDEX 2/8/2022   Count 9   Sum 25   Oswestry Score (%) 55.56      Preop CHRISTIAN         55.56%  3 mos                 55.56%    Visual Analog Pain Scale  Back Pain Scale 0-10: 5  Right leg pain: 5  Left leg pain: 0    PROMIS-10 Scores  Global Mental Health Score: (P) 8  Global Physical Health Score: (P) 11  PROMIS TOTAL - SUBSCORES: (P) 19    Physical Examination:    This was a virtual visit, so very limited exam could be performed.  Patient seemed alert, oriented x 3, cooperative, with coherent speech, and not in distress.  Able to respond to questions appropriately and follow instructions.    Imaging:    Lumbar MRI from 2/1/2022 shows postsurgical changes L2-3 and L3-4, with evidence of laminectomies at both levels.  There is improved central canal dimension compared to preoperative.  There is residual disc protrusion at L3-4 that causes some residual left greater than right subarticular stenosis.  (Of note, patient's symptoms greater on the right side).  Also with disc protrusion L5-S1 causing left greater than right subarticular stenosis.    Lumbar CT from 2/3/2022 shows post laminectomy changes L2-3 and L3-4.  Residual grade 1 spondylolisthesis L3-4.  No interval progression compared to preoperative.  There is partial calcification of protruding posterior annulus at L5-S1 causing bilateral subarticular stenosis.  Also with some degree of bilateral L5-S1 foraminal stenosis.  Also shows multilevel lumbar spondylosis, with mild vacuum disc signal L1-2.    Assessment:    1.  3 months status post two-level open laminectomies L2-L4, with progression of lumbosacral back pain symptoms.  2.  Bilateral left greater than right  foraminal and lateral recess stenosis L5-S1, with calcification of protruding posterior annulus.  3.  Bilateral left greater than right lateral recess stenosis L3-4, secondary to disc protrusion.  4.  Rule out right sacroiliac joint pain.    Plan:    - Right L5-S1 TFESI.    If (+) lasting relief, may consider rpt ANAID 2-3x/yr.    If (+) relief but only temporary, may consider surgery focusing on this level (e.g. TLIF-SPO L5-S1).    If no relief, may consider R L4 SNRB vs R SIJ injection.    RTC prn; if so, will prefer in-person visit, to also perform SIJ PE.    25 minutes spent on the date of the encounter doing chart review/review of outside records/review of test results/interpretation of tests/patient visit/documentation/discussion with other provider(s)/discussion with patient and family.      Shaka Manriquez MD    Orthopaedic Spine Surgery  Dept Orthopaedic Surgery, [facility]  [phone] office, [phone] pager  [email or web address]

## 2022-02-24 ENCOUNTER — HOSPITAL ENCOUNTER (OUTPATIENT)
Dept: GENERAL RADIOLOGY | Facility: OTHER | Age: 82
Discharge: HOME | End: 2022-02-24
Attending: ORTHOPAEDIC SURGERY | Admitting: ORTHOPAEDIC SURGERY
Payer: MEDICARE

## 2022-02-24 DIAGNOSIS — M48.062 LUMBAR STENOSIS WITH NEUROGENIC CLAUDICATION: ICD-10-CM

## 2022-02-24 DIAGNOSIS — M54.16 LUMBAR RADICULAR PAIN: ICD-10-CM

## 2022-02-24 PROCEDURE — 64483 NJX AA&/STRD TFRM EPI L/S 1: CPT

## 2022-02-24 PROCEDURE — 255N000002 HC RX 255 OP 636: Performed by: STUDENT IN AN ORGANIZED HEALTH CARE EDUCATION/TRAINING PROGRAM

## 2022-02-24 PROCEDURE — 250N000009 HC RX 250: Performed by: STUDENT IN AN ORGANIZED HEALTH CARE EDUCATION/TRAINING PROGRAM

## 2022-02-24 PROCEDURE — 250N000011 HC RX IP 250 OP 636: Performed by: STUDENT IN AN ORGANIZED HEALTH CARE EDUCATION/TRAINING PROGRAM

## 2022-02-24 RX ORDER — LIDOCAINE HYDROCHLORIDE 10 MG/ML
2 INJECTION, SOLUTION EPIDURAL; INFILTRATION; INTRACAUDAL; PERINEURAL ONCE
Status: COMPLETED | OUTPATIENT
Start: 2022-02-24 | End: 2022-02-24

## 2022-02-24 RX ORDER — LIDOCAINE HYDROCHLORIDE 10 MG/ML
1 INJECTION, SOLUTION INFILTRATION; PERINEURAL ONCE
Status: COMPLETED | OUTPATIENT
Start: 2022-02-24 | End: 2022-02-24

## 2022-02-24 RX ORDER — DEXAMETHASONE SODIUM PHOSPHATE 10 MG/ML
10 INJECTION, SOLUTION INTRAMUSCULAR; INTRAVENOUS ONCE
Status: COMPLETED | OUTPATIENT
Start: 2022-02-24 | End: 2022-02-24

## 2022-02-24 RX ADMIN — IOHEXOL 1 ML: 240 INJECTION, SOLUTION INTRATHECAL; INTRAVASCULAR; INTRAVENOUS; ORAL at 15:16

## 2022-02-24 RX ADMIN — LIDOCAINE HYDROCHLORIDE 4 ML: 10 INJECTION, SOLUTION INFILTRATION; PERINEURAL at 15:16

## 2022-02-24 RX ADMIN — LIDOCAINE HYDROCHLORIDE 2 ML: 10 INJECTION, SOLUTION EPIDURAL; INFILTRATION; INTRACAUDAL; PERINEURAL at 15:16

## 2022-02-24 RX ADMIN — DEXAMETHASONE SODIUM PHOSPHATE 10 MG: 10 INJECTION, SOLUTION INTRAMUSCULAR; INTRAVENOUS at 15:17

## 2022-11-07 ENCOUNTER — TELEPHONE (OUTPATIENT)
Dept: ORTHOPEDICS | Facility: CLINIC | Age: 82
End: 2022-11-07

## 2022-11-07 NOTE — TELEPHONE ENCOUNTER
11/07- Spoke to patient he wants to wait to schedule the appointment and call back at a later time. -LN

## 2024-07-14 ENCOUNTER — TRANSFERRED RECORDS (OUTPATIENT)
Dept: HEALTH INFORMATION MANAGEMENT | Facility: CLINIC | Age: 84
End: 2024-07-14

## 2024-10-01 ENCOUNTER — TRANSCRIBE ORDERS (OUTPATIENT)
Dept: OTHER | Age: 84
End: 2024-10-01

## 2024-10-01 DIAGNOSIS — N40.1 BENIGN PROSTATIC HYPERPLASIA WITH WEAK URINARY STREAM: ICD-10-CM

## 2024-10-01 DIAGNOSIS — R39.12 BENIGN PROSTATIC HYPERPLASIA WITH WEAK URINARY STREAM: ICD-10-CM

## 2024-10-01 DIAGNOSIS — R33.9 URINARY RETENTION: Primary | ICD-10-CM

## 2024-10-08 NOTE — TELEPHONE ENCOUNTER
MEDICAL RECORDS REQUEST   [facility]  Urology Clinic  [address] [place] [address]  PHONE: [phone]  Fax: [phone]        FUTURE VISIT INFORMATION                                                   Guevara Jones, : 1940 scheduled for future visit at [facility]    APPOINTMENT INFORMATION:  Date: 10/11/2024  Provider:  Stephon Talavera PA-C  Reason for Visit/Diagnosis: BPH    REFERRAL INFORMATION:  Referring provider:  Travis Camarillo MD @ [facility]      RECORDS REQUESTED FOR VISIT                                                     NOTES  STATUS/DETAILS   OFFICE NOTE from referring provider  2024 -- Travis Camarillo MD @ [facility]   OFFICE NOTE from other specialist  yes   OPERATIVE REPORT  yes   MEDICATION LIST  yes   LABS     URINALYSIS (UA)  yes   BENIGN PROSTATIC HYPERPLASIA (BPH)     IMAGES  YES, 2024 -- US PELVIS  2024 -- CT ABD PELVIS     PSA  no     PRE-VISIT CHECKLIST      Joint diagnostic appointment coordinated correctly          (ensure right order & amount of time) Yes   RECORD COLLECTION COMPLETE Yes

## 2024-10-10 ENCOUNTER — PRE VISIT (OUTPATIENT)
Dept: UROLOGY | Facility: CLINIC | Age: 84
End: 2024-10-10
Payer: COMMERCIAL

## 2024-10-10 NOTE — TELEPHONE ENCOUNTER
Reason for visit: BPH     Relevant information: urinary retention, BPH with weak urinary stream    Records/imaging/labs/orders: all records available    At Rooming: Virtual visit      Ancelmo Kim  10/10/2024  10:43 AM

## 2024-10-10 NOTE — PROGRESS NOTES
Virtual Visit Details    Type of service:  Video Visit   Video Start Time: 12:28 PM  Video End Time:12:54 PM    Originating Location (pt. Location): Home    Distant Location (provider location):  Off-site  Platform used for Video Visit: [facility]    Visit conducted via real-time audio/video technology by Stephon Talavera PA-C to the patient in their home.    Subjective      REFERRING PROVIDER  Dr. Travis Camarillo MD    REASON FOR VISIT  HoLEP referral    HISTORY OF PRESENT ILLNESS  Mr. Jones is a 84 year old male who I am speaking with today in regards to his 6-month history of urinary retention.  His past medical history is significant for degenerative spondylolisthesis, GERD, history of CVA, hyperlipidemia, hypertension, spinal stenosis of the lumbar region, and CKD stage IIIa.  I personally reviewed the outside urology documentation, most recently from 9/30/2024 as well as multiple emergency department notes most recently from 9/13/2024 in preparation for today's visit.    It appears that Flo first went into urinary retention in May 2024 and had an unsuccessful void trial in June 2024.  After discussing treatment options, plan was to attempt a PAE which was first canceled due to an MALATHI and leukocytosis, and then was unsuccessful given calcifications.  Met with his urologist in [place] who reviewed different medical and surgical options, and Flo elected to be referred here for discussion of HoLEP    Today:  Catheter has been problematic, but better recently   Currently taking tamsulosin  Catheter last exchanged on 10/10/2024   No other history of urinary retention   Currently taking plavix   Flo is a [employer]ism who will not be able to take blood transfusions, wants to know if there is a significant risk for bleeding for the procedure    SOCIAL HISTORY  Former smoker, quit in 1972    FAMILY HISTORY   Denies any known family history of urologic malignancy     REVIEW OF SYSTEMS   Review of Systems    Constitutional:  Negative for fatigue and unexpected weight change.   HENT:  Negative for ear pain.    Eyes:  Negative for visual disturbance.   Respiratory:  Positive for shortness of breath (After walking about 100 ft).    Cardiovascular:  Negative for chest pain.   Gastrointestinal:  Negative for abdominal pain and constipation.   Genitourinary:  Negative for hematuria.   Musculoskeletal:  Positive for back pain (Very chronic, hx of surgery, no change).   Neurological:  Negative for dizziness and light-headedness.   Hematological:  Negative for adenopathy.   Psychiatric/Behavioral:  Negative for sleep disturbance.       Objective      PHYSICAL EXAMINATION  Deferred given virtual visit.    Assessment & Plan    BPH with urinary retention status post failed prostate arterial embolization    It was my pleasure to speak with Flo in regards to his urinary retention desiring HoLEP. After reviewing his clinical history, we discussed that a HoLEP procedure he is hoping to get done is a procedure where we use a laser to remove the inside portion of the prostate that is causing obstruction leading to urinary retention.  We discussed the details of the procedure as well as the risks of the procedure including the risks of anesthesia, risk of bleeding, risk of damage to surrounding structures, the fact that they be waking up with a catheter that should be removed the next day, the fact that this procedure leads to a 1 night stay in the hospital, and that he should expect to have worsening urinary frequency, urgency, urinary incontinence, and some discomfort when starting and ending his urinary stream for minimum 6 weeks after the surgery, upwards of 6 months after the surgery.    Flo and his son asked multiple insightful questions in regards to the procedure, but ultimately expressed a desire to move forward with this procedure specifically with Dr. Gaines as this is the urologist that Dr. Rutledge recommended.  We will  have him get on the calendar for first available cystoscopy with Dr. Gaines for presurgical planning purposes and so they have the opportunity to ask more questions.    We did specifically discuss the bleeding risk and that he should not need to worry about needing a transfusion from this procedure, however this is something he should ask specifically of Dr. Gaines during his cystoscopy appointment.    Mr. Jones expressed understanding and agreement to the above discussion and plan and all of his questions were answered to his satisfaction.     PLAN  First available cystoscopy with either Dr. Gaines, Dr. Mena, or Dr. Brennan    SIGNED    Stephon Talavera PA-C      I spent a total of 25 minutes spent on the date of the encounter doing chart review, history and exam, documentation, and further activities as noted above.

## 2024-10-10 NOTE — TELEPHONE ENCOUNTER
Reason for visit: BPH     Relevant information: Urinary retention, BPH    Records/imaging/labs/orders: establish care    At Rooming: Virtual visit      Ancelmo Kim  10/10/2024  1:21 PM

## 2024-10-11 ENCOUNTER — VIRTUAL VISIT (OUTPATIENT)
Dept: UROLOGY | Facility: CLINIC | Age: 84
End: 2024-10-11
Payer: COMMERCIAL

## 2024-10-11 ENCOUNTER — PRE VISIT (OUTPATIENT)
Dept: UROLOGY | Facility: CLINIC | Age: 84
End: 2024-10-11

## 2024-10-11 DIAGNOSIS — R33.9 URINARY RETENTION: ICD-10-CM

## 2024-10-11 DIAGNOSIS — N40.1 BENIGN PROSTATIC HYPERPLASIA WITH WEAK URINARY STREAM: ICD-10-CM

## 2024-10-11 DIAGNOSIS — R39.12 BENIGN PROSTATIC HYPERPLASIA WITH WEAK URINARY STREAM: ICD-10-CM

## 2024-10-11 PROCEDURE — 99214 OFFICE O/P EST MOD 30 MIN: CPT | Mod: 95 | Performed by: STUDENT IN AN ORGANIZED HEALTH CARE EDUCATION/TRAINING PROGRAM

## 2024-10-11 RX ORDER — CLOPIDOGREL BISULFATE 75 MG/1
75 TABLET ORAL DAILY
COMMUNITY
Start: 2024-10-11

## 2024-10-11 ASSESSMENT — ENCOUNTER SYMPTOMS
BACK PAIN: 1
ABDOMINAL PAIN: 0
CONSTIPATION: 0
FATIGUE: 0
UNEXPECTED WEIGHT CHANGE: 0
SLEEP DISTURBANCE: 0
ADENOPATHY: 0
DIZZINESS: 0
LIGHT-HEADEDNESS: 0
HEMATURIA: 0
SHORTNESS OF BREATH: 1

## 2024-10-11 NOTE — NURSING NOTE
Current patient location: [address]  [facility] [address]    Is the patient currently in the state of [place]? YES    Visit mode:VIDEO    If the visit is dropped, the patient can be reconnected by: VIDEO VISIT: Text to cell phone:   Telephone Information:   Mobile [phone]       Will anyone else be joining the visit? NO  (If patient encounters technical issues they should call [phone]    Are changes needed to the allergy or medication list? No    Are refills needed on medications prescribed by this physician? NO    Rooming Documentation:  Unable to complete questionnaire(s) due to time    Reason for visit: Consult    Grazyna PARTIDA

## 2024-10-11 NOTE — LETTER
10/11/2024       RE: Guevara Jones  [address]  [facility] [address]     Dear Colleague,    Thank you for referring your patient, Guevara Jones, to the [facility] [place] at [facility]. Please see a copy of my visit note below.    Virtual Visit Details    Type of service:  Video Visit   Video Start Time: 12:28 PM  Video End Time:12:54 PM    Originating Location (pt. Location): Home    Distant Location (provider location):  Off-site  Platform used for Video Visit: [facility]    Visit conducted via real-time audio/video technology by Stephon Talavera PA-C to the patient in their home.    Subjective     REFERRING PROVIDER  Dr. Travis Camarillo MD    REASON FOR VISIT  HoLEP referral    HISTORY OF PRESENT ILLNESS  Mr. Jones is a 84 year old male who I am speaking with today in regards to his 6-month history of urinary retention.  His past medical history is significant for degenerative spondylolisthesis, GERD, history of CVA, hyperlipidemia, hypertension, spinal stenosis of the lumbar region, and CKD stage IIIa.  I personally reviewed the outside urology documentation, most recently from 9/30/2024 as well as multiple emergency department notes most recently from 9/13/2024 in preparation for today's visit.    It appears that Flo first went into urinary retention in May 2024 and had an unsuccessful void trial in June 2024.  After discussing treatment options, plan was to attempt a PAE which was first canceled due to an MALATHI and leukocytosis, and then was unsuccessful given calcifications.  Met with his urologist in [place] who reviewed different medical and surgical options, and Flo elected to be referred here for discussion of HoLEP    Today:  Catheter has been problematic, but better recently   Currently taking tamsulosin  Catheter last exchanged on 10/10/2024   No other history of urinary retention   Currently taking plavix   Flo is a  [employer] who will not be able to take blood transfusions, wants to know if there is a significant risk for bleeding for the procedure    SOCIAL HISTORY  Former smoker, quit in 1972    FAMILY HISTORY   Denies any known family history of urologic malignancy     REVIEW OF SYSTEMS   Review of Systems   Constitutional:  Negative for fatigue and unexpected weight change.   HENT:  Negative for ear pain.    Eyes:  Negative for visual disturbance.   Respiratory:  Positive for shortness of breath (After walking about 100 ft).    Cardiovascular:  Negative for chest pain.   Gastrointestinal:  Negative for abdominal pain and constipation.   Genitourinary:  Negative for hematuria.   Musculoskeletal:  Positive for back pain (Very chronic, hx of surgery, no change).   Neurological:  Negative for dizziness and light-headedness.   Hematological:  Negative for adenopathy.   Psychiatric/Behavioral:  Negative for sleep disturbance.       Objective     PHYSICAL EXAMINATION  Deferred given virtual visit.    Assessment & Plan   BPH with urinary retention status post failed prostate arterial embolization    It was my pleasure to speak with Don in regards to his urinary retention desiring HoLEP. After reviewing his clinical history, we discussed that a HoLEP procedure he is hoping to get done is a procedure where we use a laser to remove the inside portion of the prostate that is causing obstruction leading to urinary retention.  We discussed the details of the procedure as well as the risks of the procedure including the risks of anesthesia, risk of bleeding, risk of damage to surrounding structures, the fact that they be waking up with a catheter that should be removed the next day, the fact that this procedure leads to a 1 night stay in the hospital, and that he should expect to have worsening urinary frequency, urgency, urinary incontinence, and some discomfort when starting and ending his urinary stream for minimum 6 weeks  after the surgery, upwards of 6 months after the surgery.    Flo and his son asked multiple insightful questions in regards to the procedure, but ultimately expressed a desire to move forward with this procedure specifically with Dr. Gaines as this is the urologist that Dr. Rutledge recommended.  We will have him get on the calendar for first available cystoscopy with Dr. Gaines for presurgical planning purposes and so they have the opportunity to ask more questions.    We did specifically discuss the bleeding risk and that he should not need to worry about needing a transfusion from this procedure, however this is something he should ask specifically of Dr. Gaines during his cystoscopy appointment.    Mr. Jones expressed understanding and agreement to the above discussion and plan and all of his questions were answered to his satisfaction.     PLAN  First available cystoscopy with either Dr. Gaines, Dr. Mena, or Dr. Brennan    SIGNED    Stephon Talavera PA-C      I spent a total of 25 minutes spent on the date of the encounter doing chart review, history and exam, documentation, and further activities as noted above.      Again, thank you for allowing me to participate in the care of your patient.      Sincerely,    Stephon Talavera PA-C

## 2024-10-14 ENCOUNTER — TELEPHONE (OUTPATIENT)
Dept: UROLOGY | Facility: CLINIC | Age: 84
End: 2024-10-14
Payer: COMMERCIAL

## 2024-10-14 NOTE — TELEPHONE ENCOUNTER
Patient called in, LVM asking what time his procedure is scheduled for on Friday. Routing to team. Jojo Cosby on 10/14/2024 at 2:43 PM

## 2024-10-15 NOTE — TELEPHONE ENCOUNTER
Called patient and left message. Per Dr. Gaines, VV with any of the endourology surgeons would be appropriate.     ANTONIO Marroquin  Care Coordinator- Urology   [phone]

## 2024-10-16 NOTE — TELEPHONE ENCOUNTER
Spoke with patient and scheduled virtual visit with Dr. Gaines per his preference.     ANTONIO Marroquin  Care Coordinator- Urology   [phone]

## 2024-10-23 ENCOUNTER — PRE VISIT (OUTPATIENT)
Dept: UROLOGY | Facility: CLINIC | Age: 84
End: 2024-10-23
Payer: COMMERCIAL

## 2024-10-23 NOTE — TELEPHONE ENCOUNTER
Reason for visit: HoLEP consult     Relevant information: Seen by Uriel Talavera 10/11    Records/imaging/labs/orders: In EPIC    Pt called: No need for a call    At Rooming: Standard, AUA questionnaire sent    Cara Simmons RN  10/23/2024  3:31 PM

## 2024-10-29 ENCOUNTER — VIRTUAL VISIT (OUTPATIENT)
Dept: UROLOGY | Facility: CLINIC | Age: 84
End: 2024-10-29
Payer: COMMERCIAL

## 2024-10-29 DIAGNOSIS — R33.9 URINARY RETENTION: Primary | ICD-10-CM

## 2024-10-29 PROCEDURE — 99214 OFFICE O/P EST MOD 30 MIN: CPT | Mod: 95 | Performed by: UROLOGY

## 2024-10-29 RX ORDER — AMLODIPINE BESYLATE 10 MG/1
10 TABLET ORAL
COMMUNITY
Start: 2024-10-19 | End: 2024-11-18

## 2024-10-29 NOTE — NURSING NOTE
Current patient location: [address] [facility] [address]    Is the patient currently in the state of [place]? YES    Visit mode:VIDEO    If the visit is dropped, the patient can be reconnected by: VIDEO VISIT: Text to cell phone:   Telephone Information:   Mobile [phone]       Will anyone else be joining the visit? NO  (If patient encounters technical issues they should call [phone])    Are changes needed to the allergy or medication list? No    Are refills needed on medications prescribed by this physician? NO    Rooming Documentation:  Not applicable    Reason for visit: AWILDA PARTIDA

## 2024-10-29 NOTE — LETTER
10/29/2024       RE: Guevara Jones  [address]  [facility] [address]     Dear Colleague,    Thank you for referring your patient, Guevara Jones, to the [facility] [place] at [facility]. Please see a copy of my visit note below.          UROLOGY OUTPATIENT VISIT      Chief Complaint:   Urinary Retention      Synopsis    Guevara Jones is a very pleasant AGE: 84 year old year old person.  He is being referred from Stephon Talavera for consideration of laser enucleation of the prostate.  Prior medical history is notable for history of stroke, lumbar spinal stenosis.  He has a known very large prostate and went into urinary retention in May 2024.  Given the very large size of his prostate and the fact that he was taking Plavix he underwent an attempt at prostate artery embolization.  This was initially canceled for MALATHI and later inability to embolize the prostatic arteries given difficult anatomy.  He is poorly tolerating his catheter.     He is a Confucianism    On last CT I personally measured his prostate and estimated at 7 x 7 x 8 cm for an estimated size just under 200 gm    It has been just under one year since since a cardiac stent was placed, he is under the impression he will be able to stop plavix at that time.           Medications     Current Outpatient Medications   Medication Sig Dispense Refill     amLODIPine (NORVASC) 10 MG tablet Take 10 mg by mouth.       acetaminophen (TYLENOL) 325 MG tablet Take 2 tablets (650 mg) by mouth every 4 hours as needed for pain 60 tablet 0     Alfalfa 250 MG TABS 2 tablets       Ascorbic Acid (VITAMIN C) POWD        aspirin (ASA) 325 MG tablet Take 1 tablet (325 mg) by mouth every morning       clopidogrel (PLAVIX) 75 MG tablet Take 1 tablet (75 mg) by mouth daily.       cod liver oil CAPS capsule        gabapentin (NEURONTIN) 600 MG tablet Take 600 mg by mouth 2 times daily 1 in the  morning, two in the evening       Glucosamine-Chondroitin--300-250 MG TABS        HYDROcodone-acetaminophen (NORCO) 5-325 MG tablet Take 1 tablet by mouth every 6 hours as needed for severe pain (decrease number of tablets as pain improves) 28 tablet 0     hydrOXYzine (ATARAX) 10 MG tablet Take 1 tablet (10 mg) by mouth every 6 hours as needed for itching (and nausea) 30 tablet 0     methocarbamol (ROBAXIN) 750 MG tablet Take 1 tablet (750 mg) by mouth every 6 hours as needed for muscle spasms (muscle spasm) 60 tablet 1     oxyCODONE (ROXICODONE) 5 MG tablet Take 1-2 tablets (5-10 mg) by mouth every 4 hours as needed for severe pain (decrease number of tablets as pain improves) 35 tablet 0     PHOSPHATIDYL CHOLINE PO        pregabalin (LYRICA) 75 MG capsule TAKE 1 CAPSULE BY MOUTH TWICE A DAY       red yeast rice 600 MG CAPS Take 600 mg by mouth daily       senna-docusate (SENOKOT-S/PERICOLACE) 8.6-50 MG tablet Take 1 tablet by mouth daily 30 tablet 0     tamsulosin (FLOMAX) 0.4 MG capsule Take 0.4 mg by mouth every evening        No current facility-administered medications for this visit.         The following  distinct labs were reviewed    I personally reviewed all applicable laboratory data and went over findings with patient  Significant for:    September 2024  White blood cell count 13.1  Hemoglobin 11.2  Platelets 314  Creatinine 1.7    Recent Imaging Report    I personally reviewed all applicable imaging and went over the below findings with patient.    CT 5/24  IMPRESSION:   1 New pleural-based predominant lower lung masses and nodules. Appearance is concerning for metastatic pulmonary and pleural-based nodules.     2. Air-fluid levels throughout the colon without obstruction could represent some mild enteritis.     3. Prostatomegaly.                Assessment/Plan   84 year old year old person with large prostate, urinary retention, anticoagulant use, Latter day  -We discussed the available  management options for his large prostate and urinary retention.  His desire would be to have the catheter removed and given that he is failed prostate artery embolization I believe that a suitable alternative may be holmium laser nucleation.  We discussed that this does still carry with it a risk of bleeding and this is particularly true in the setting of a very large prostate.  The fact that he is a Yazidism does put him at some increased risk for possible serious adverse related events if bleeding were to be encountered.  We would like him to see our preanesthesia clinic to optimize his hemoglobin and minimize risk of perioperative events related to bleeding.  Further he will meet with his cardiologist when it is 1 year since his cardiac stent to ensure that he can hold Plavix.    Additionally we discussed the associated risks of this procedure included but not limited to the following:  -Bleeding, potentially significant enough to require clot evacuation and blood transfusion (although this would not be possible in his case considering Yazidism status.)  -Infection, for which we will plan to treat preoperatively based on targeted antibiotic therapy  -Damage to the bladder, urethra and penis including the risk of urethral stricture and bladder neck contracture  -Risk of incidentally discovered prostate cancer.  We discussed that this would not preclude him from further therapy though it could prolong recovery and potentially increase risk of complications associated with cancer treatment.  Further preoperative workup to assess for prostate cancer prior to surgery was offered but patient deferred.  -Risk of retrograde ejaculation which would be expected to occur in the majority if not all men after HoLEP  -Risk of urinary incontinence.  We discussed that in the majority of men this is a transient process that is generally self limited to the first 6-12 weeks after surgery though could take longer  to resolve depending on baseline bladder instability.  -Risk of postperative urinary retention though in published series HoLEP has been found to be associated with high success rates of achieving spontaneous voiding even in men with underactive and atonic bladders.  -We will proceed with preoperative clearance with preference to minimize all anticoagulation as deemed acceptable by his primary care provider.       CC:  Luis Barone                Virtual Visit Details    Type of service:  Video Visit   Video Start Time: 8:30  Video End Time: 8:45    Originating Location (pt. Location): Home    Distant Location (provider location):  Off-site  Platform used for Video Visit: [employer]      Again, thank you for allowing me to participate in the care of your patient.      Sincerely,    Jorgito Gaines MD

## 2024-10-29 NOTE — PROGRESS NOTES
UROLOGY OUTPATIENT VISIT      Chief Complaint:   Urinary Retention      Synopsis    Guevara Jones is a very pleasant AGE: 84 year old year old person.  He is being referred from [facility] for consideration of laser enucleation of the prostate.  Prior medical history is notable for history of stroke, lumbar spinal stenosis.  He has a known very large prostate and went into urinary retention in May 2024.  Given the very large size of his prostate and the fact that he was taking Plavix he underwent an attempt at prostate artery embolization.  This was initially canceled for MALATHI and later inability to embolize the prostatic arteries given difficult anatomy.  He is poorly tolerating his catheter.     He is a Orthodox    On last CT I personally measured his prostate and estimated at 7 x 7 x 8 cm for an estimated size just under 200 gm    It has been just under one year since since a cardiac stent was placed, he is under the impression he will be able to stop plavix at that time.           Medications     Current Outpatient Medications   Medication Sig Dispense Refill    amLODIPine (NORVASC) 10 MG tablet Take 10 mg by mouth.      acetaminophen (TYLENOL) 325 MG tablet Take 2 tablets (650 mg) by mouth every 4 hours as needed for pain 60 tablet 0    Alfalfa 250 MG TABS 2 tablets      Ascorbic Acid (VITAMIN C) POWD       aspirin (ASA) 325 MG tablet Take 1 tablet (325 mg) by mouth every morning      clopidogrel (PLAVIX) 75 MG tablet Take 1 tablet (75 mg) by mouth daily.      cod liver oil CAPS capsule       gabapentin (NEURONTIN) 600 MG tablet Take 600 mg by mouth 2 times daily 1 in the morning, two in the evening      Glucosamine-Chondroitin--300-250 MG TABS       HYDROcodone-acetaminophen (NORCO) 5-325 MG tablet Take 1 tablet by mouth every 6 hours as needed for severe pain (decrease number of tablets as pain improves) 28 tablet 0    hydrOXYzine (ATARAX) 10 MG tablet Take 1 tablet (10 mg) by  mouth every 6 hours as needed for itching (and nausea) 30 tablet 0    methocarbamol (ROBAXIN) 750 MG tablet Take 1 tablet (750 mg) by mouth every 6 hours as needed for muscle spasms (muscle spasm) 60 tablet 1    oxyCODONE (ROXICODONE) 5 MG tablet Take 1-2 tablets (5-10 mg) by mouth every 4 hours as needed for severe pain (decrease number of tablets as pain improves) 35 tablet 0    PHOSPHATIDYL CHOLINE PO       pregabalin (LYRICA) 75 MG capsule TAKE 1 CAPSULE BY MOUTH TWICE A DAY      red yeast rice 600 MG CAPS Take 600 mg by mouth daily      senna-docusate (SENOKOT-S/PERICOLACE) 8.6-50 MG tablet Take 1 tablet by mouth daily 30 tablet 0    tamsulosin (FLOMAX) 0.4 MG capsule Take 0.4 mg by mouth every evening        No current facility-administered medications for this visit.         The following  distinct labs were reviewed    I personally reviewed all applicable laboratory data and went over findings with patient  Significant for:    September 2024  White blood cell count 13.1  Hemoglobin 11.2  Platelets 314  Creatinine 1.7    Recent Imaging Report    I personally reviewed all applicable imaging and went over the below findings with patient.    CT 5/24  IMPRESSION:   1 New pleural-based predominant lower lung masses and nodules. Appearance is concerning for metastatic pulmonary and pleural-based nodules.     2. Air-fluid levels throughout the colon without obstruction could represent some mild enteritis.     3. Prostatomegaly.                Assessment/Plan   84 year old year old person with large prostate, urinary retention, anticoagulant use, Worship  -We discussed the available management options for his large prostate and urinary retention.  His desire would be to have the catheter removed and given that he is failed prostate artery embolization I believe that a suitable alternative may be holmium laser nucleation.  We discussed that this does still carry with it a risk of bleeding and this is  particularly true in the setting of a very large prostate.  The fact that he is a [employer] does put him at some increased risk for possible serious adverse related events if bleeding were to be encountered.  We would like him to see our preanesthesia clinic to optimize his hemoglobin and minimize risk of perioperative events related to bleeding.  Further he will meet with his cardiologist when it is 1 year since his cardiac stent to ensure that he can hold Plavix.    Additionally we discussed the associated risks of this procedure included but not limited to the following:  -Bleeding, potentially significant enough to require clot evacuation and blood transfusion (although this would not be possible in his case considering Roman [employer] status.)  -Infection, for which we will plan to treat preoperatively based on targeted antibiotic therapy  -Damage to the bladder, urethra and penis including the risk of urethral stricture and bladder neck contracture  -Risk of incidentally discovered prostate cancer.  We discussed that this would not preclude him from further therapy though it could prolong recovery and potentially increase risk of complications associated with cancer treatment.  Further preoperative workup to assess for prostate cancer prior to surgery was offered but patient deferred.  -Risk of retrograde ejaculation which would be expected to occur in the majority if not all men after HoLEP  -Risk of urinary incontinence.  We discussed that in the majority of men this is a transient process that is generally self limited to the first 6-12 weeks after surgery though could take longer to resolve depending on baseline bladder instability.  -Risk of postperative urinary retention though in published series HoLEP has been found to be associated with high success rates of achieving spontaneous voiding even in men with underactive and atonic bladders.  -We will proceed with preoperative clearance with  preference to minimize all anticoagulation as deemed acceptable by his primary care provider.       CC:  Luis Barone                Virtual Visit Details    Type of service:  Video Visit   Video Start Time: 8:30  Video End Time: 8:45    Originating Location (pt. Location): Home    Distant Location (provider location):  Off-site  Platform used for Video Visit: John

## 2024-11-01 ENCOUNTER — TELEPHONE (OUTPATIENT)
Dept: UROLOGY | Facility: CLINIC | Age: 84
End: 2024-11-01
Payer: COMMERCIAL

## 2024-11-01 ENCOUNTER — HOSPITAL ENCOUNTER (OUTPATIENT)
Facility: CLINIC | Age: 84
End: 2024-11-01
Attending: UROLOGY | Admitting: UROLOGY
Payer: MEDICARE

## 2024-11-01 NOTE — TELEPHONE ENCOUNTER
4hr DAVIAN    Spoke with: Patient       Date of surgery: Thursday January 9 2025       Location: [facility]       Informed patient they will need a adult : 23 hr obs      Pre op with provider: Patient will schedule a pre op 2-3 weeks prior at [facility] in [place]     Patient set up for a  Virtual PAC for anesthesia eval  11/21    Post op  Patient added to list             Pre procedure covid : Not req      Additional imaging: NA        Surgery Packet : Sent via [facility]      Additional comments: Please call for surgery teaching

## 2024-11-04 NOTE — TELEPHONE ENCOUNTER
FUTURE VISIT INFORMATION      SURGERY INFORMATION:  Date: 25  Location: [facility]  Surgeon:  Jorgito Gaines MD   Anesthesia Type:  choice  Procedure: Holmium Laser Enucleation of the Prostate   Consult: virtual visit 10/29/24    RECORDS REQUESTED FROM:       Primary Care Provider: [facility]    Most recent EKG+ Tracin/15/24- [facility]    Most recent ECHO: 3/6/24- [facility]

## 2024-11-21 ENCOUNTER — PRE VISIT (OUTPATIENT)
Dept: SURGERY | Facility: CLINIC | Age: 84
End: 2024-11-21

## 2024-11-21 ENCOUNTER — ANESTHESIA EVENT (OUTPATIENT)
Dept: SURGERY | Facility: CLINIC | Age: 84
End: 2024-11-21

## 2024-11-26 ENCOUNTER — VIRTUAL VISIT (OUTPATIENT)
Dept: SURGERY | Facility: CLINIC | Age: 84
End: 2024-11-26
Payer: COMMERCIAL

## 2024-11-26 VITALS — BODY MASS INDEX: 31.83 KG/M2 | HEIGHT: 68 IN | WEIGHT: 210 LBS

## 2024-11-26 DIAGNOSIS — D64.9 ANEMIA, UNSPECIFIED TYPE: ICD-10-CM

## 2024-11-26 DIAGNOSIS — R33.9 URINARY RETENTION: Primary | ICD-10-CM

## 2024-11-26 RX ORDER — GENTAMICIN SULFATE 3 MG/ML
1 SOLUTION/ DROPS OPHTHALMIC EVERY 4 HOURS
COMMUNITY

## 2024-11-26 RX ORDER — ATORVASTATIN CALCIUM 40 MG
40 TABLET ORAL EVERY EVENING
COMMUNITY

## 2024-11-26 RX ORDER — EZETIMIBE 10 MG/1
10 TABLET ORAL DAILY
COMMUNITY
Start: 2024-08-12

## 2024-11-26 RX ORDER — AMLODIPINE BESYLATE 5 MG/1
5 TABLET ORAL 2 TIMES DAILY
COMMUNITY
Start: 2024-11-15

## 2024-11-26 RX ORDER — NIACINAMIDE 500 MG
1 TABLET, EXTENDED RELEASE ORAL 2 TIMES DAILY
COMMUNITY
Start: 2024-09-16

## 2024-11-26 RX ORDER — NEOMYCIN SULFATE, POLYMYXIN B SULFATE AND DEXAMETHASONE 3.5; 10000; 1 MG/ML; [USP'U]/ML; MG/ML
1 SUSPENSION/ DROPS OPHTHALMIC 4 TIMES DAILY
COMMUNITY
Start: 2024-11-20

## 2024-11-26 RX ORDER — VIT C/B6/B5/MAGNESIUM/HERB 173 50-5-6-5MG
750 CAPSULE ORAL 2 TIMES DAILY
COMMUNITY

## 2024-11-26 RX ORDER — NITROGLYCERIN 0.4 MG/1
0.4 TABLET SUBLINGUAL EVERY 5 MIN PRN
COMMUNITY
Start: 2024-07-23

## 2024-11-26 RX ORDER — TORSEMIDE 20 MG/1
20 TABLET ORAL 2 TIMES DAILY
COMMUNITY
Start: 2024-11-06

## 2024-11-26 RX ORDER — PREDNISONE 1 MG/1
1 TABLET ORAL DAILY
COMMUNITY
Start: 2024-07-24 | End: 2025-01-20

## 2024-11-26 ASSESSMENT — LIFESTYLE VARIABLES: TOBACCO_USE: 1

## 2024-11-26 ASSESSMENT — PAIN SCALES - GENERAL: PAINLEVEL_OUTOF10: MODERATE PAIN (4)

## 2024-11-26 NOTE — CONSULTS
Anesthesia Consult Note      Reason for consult:   High Risk consult  Urinary retention       Date of Encounter: 11/26/2024  Referring Physician: Dr. Gaines  Primary Care Physician:  Luis Barone  Guevara Jones is a 84 year old male who is being evaluated for comorbid conditions of hypertension, hyperlipidemia, HFpEF, CAD, NSTEMI status post JOSUE x 2 (11/2023), PFO, former tobacco use, granulomatosis with polyangitis, on chronic immunosuppression, pulmonary nodules, dyspnea on exertion, on supplemental O2, CVA, history of concussion, ataxia, OA, spinal stenosis status post laminectomies, GERD, and CKD.     Patient follows with urology for symptoms of urinary retention requiring Gaines catheterization secondary to prostatomegaly. Given the very large size of his prostate and the fact that he was taking Plavix he underwent an attempt at prostate artery embolization.  This was initially canceled for MALATHI and later inability to embolize the prostatic arteries given difficult anatomy.  He is poorly tolerating his catheter.  He was subsequently referred to Dr. Nuñez on 10/29/2024 for consideration of a HoLEP for further management. He presents virtually to PAC clinic today for a high risk anesthesia consult.     History of bleeding/coagulopathy  none    History of anesthesia issues in patient or 1st degree relative  No previous issues with anesthesia for the patient or a first degree relative    History is obtained from the patient, patient's friend, patient's daughter and chart review.     Past Medical History  Past Medical History:   Diagnosis Date    (HFpEF) heart failure with preserved ejection fraction (H)     Anemia     Ataxia     BPH (benign prostatic hyperplasia)     CAD (coronary artery disease)     CKD (chronic kidney disease) stage 3, GFR 30-59 ml/min (H)     DEXTER (dyspnea on exertion)     Edema     Gastroesophageal reflux disease with esophagitis     Granulomatosis with polyangiitis (H)      Hepatic steatosis     History of concussion     History of CVA (cerebrovascular accident)     HLD (hyperlipidemia)     HTN (hypertension)     Insomnia     Obesity     Osteoarthritis     PFO (patent foramen ovale)     Pulmonary nodules     Spinal stenosis of lumbar region with neurogenic claudication     Urinary retention        Past Surgical History  Past Surgical History:   Procedure Laterality Date    APPENDECTOMY      LAMINECTOMY LUMBAR POSTERIOR MICROSCOPIC TWO LEVELS N/A 11/1/2021    Procedure: Open laminectomies lumbar 2-4, Repair of Dural Tear.  ;  Surgeon: Shaka Manriquez MD;  Location: [facility]    TOTAL SHOULDER ARTHROPLASTY Right        Prior to Admission Medications  Current Outpatient Medications   Medication Sig Dispense Refill    acetaminophen (TYLENOL) 325 MG tablet Take 2 tablets (650 mg) by mouth every 4 hours as needed for pain (Patient taking differently: Take 650 mg by mouth 3 times daily.) 60 tablet 0    amLODIPine (NORVASC) 5 MG tablet Take 5 mg by mouth 2 times daily.      Ascorbic Acid (VITAMIN C) POWD Take 1 tablet by mouth 3 times daily.      Aspirin 81 MG CAPS Take 81 mg by mouth every morning.      cod liver oil CAPS capsule Take 1 capsule by mouth every morning. OMEGA 3 FISH OIL      Digestive Enzymes CAPS Take 1 capsule by mouth 2 times daily.      diphenhydrAMINE-acetaminophen (TYLENOL PM)  MG tablet Take 1 tablet by mouth at bedtime.      ezetimibe (ZETIA) 10 MG tablet Take 10 mg by mouth daily.      gentamicin (GARAMYCIN) 0.3 % ophthalmic solution Place 1 drop into both eyes every 4 hours.      LIPITOR 40 MG tablet Take 40 mg by mouth every evening.      neomycin-polymyxin-dexAMETHasone (MAXITROL) 3.5-56276-2.1 SUSP ophthalmic susp Place 1 drop into both eyes 4 times daily.      nitroGLYcerin (NITROSTAT) 0.4 MG sublingual tablet Place 0.4 mg under the tongue every 5 minutes as needed for chest pain.      predniSONE (DELTASONE) 1 MG tablet Take 1 mg by mouth daily.       "pregabalin (LYRICA) 75 MG capsule Take 150 mg by mouth 2 times daily.      senna-docusate (SENOKOT-S/PERICOLACE) 8.6-50 MG tablet Take 1 tablet by mouth daily (Patient taking differently: Take 1 tablet by mouth 2 times daily.) 30 tablet 0    tamsulosin (FLOMAX) 0.4 MG capsule Take 0.4 mg by mouth 2 times daily.      torsemide (DEMADEX) 20 MG tablet Take 20 mg by mouth 2 times daily.      Turmeric (CURCUMIN 95) 500 MG CAPS Take 750 mg by mouth 2 times daily.      UNABLE TO FIND Take 1 tablet by mouth every morning. MEDICATION NAME: MICHAEL FOOD \"IRON BLOOD BUILDER\"  SUPPLEMENT PLANT BASED      Alfalfa 250 MG TABS 2 tablets (Patient not taking: Reported on 2024)      gabapentin (NEURONTIN) 600 MG tablet Take 600 mg by mouth 2 times daily 1 in the morning, two in the evening      Glucosamine-Chondroitin--300-250 MG TABS Take by mouth every morning. (Patient not taking: Reported on 2024)      HYDROcodone-acetaminophen (NORCO) 5-325 MG tablet Take 1 tablet by mouth every 6 hours as needed for severe pain (decrease number of tablets as pain improves) 28 tablet 0    PHOSPHATIDYL CHOLINE PO Take by mouth as needed. (Patient not taking: Reported on 2024)      red yeast rice 600 MG CAPS Take 600 mg by mouth daily (Patient not taking: Reported on 2024)           Allergies  Allergies   Allergen Reactions    Latex Hives     Other Reaction(s): itchy red rash/welts    Lentil Other (See Comments)       Social History  Social History     Socioeconomic History    Marital status:      Spouse name: Not on file    Number of children: Not on file    Years of education: Not on file    Highest education level: Not on file   Occupational History    Not on file   Tobacco Use    Smoking status: Former     Current packs/day: 0.00     Types: Cigarettes     Quit date: 1970     Years since quittin.9     Passive exposure: Past    Smokeless tobacco: Never   Substance and Sexual Activity    Alcohol use: Not " Currently    Drug use: Not Currently     Types: Psilocybin    Sexual activity: Not on file   Other Topics Concern    Not on file   Social History Narrative    Not on file     Social Drivers of Health     Financial Resource Strain: Not on file   Food Insecurity: No Food Insecurity (6/5/2024)    Received from [facility]    Hunger Vital Sign     Worried About Running Out of Food in the Last Year: Never true     Ran Out of Food in the Last Year: Never true   Transportation Needs: No Transportation Needs (9/9/2024)    [facility]    OASIS : Transportation     Lack of Transportation (Medical): No     Lack of Transportation (Non-Medical): No     Patient Unable or Declines to Respond: No   Physical Activity: Not on file   Stress: Not on file   Social Connections: Feeling Socially Integrated (9/9/2024)    Received from [facility] [facility]    OASIS : Social Isolation     Frequency of experiencing loneliness or isolation: Never   Interpersonal Safety: Not At Risk (11/14/2024)    [facility]    EH IP Custom IPV     Do you feel UNSAFE in any of your personal relationships with your family members or any other acquaintances?: No   Housing Stability: Low Risk  (6/5/2024)    Received from [facility]    Housing Stability Vital Sign     Unable to Pay for Housing in the Last Year: No     Number of Times Moved in the Last Year: 0     Homeless in the Last Year: No       Family History  Family History   Problem Relation Age of Onset    Heart Disease Mother         open heart surgery    Cancer Father     Anesthesia Reaction No family hx of     Bleeding Disorder No family hx of     Clotting Disorder No family hx of        The complete review of systems is negative other than noted in the HPI or here. Anesthesia Evaluation   Pt has had  prior anesthetic. Type: General.        ROS/MED HX  ENT/Pulmonary: Comment: - Multiple pulmonary nodules thought to be related to GPA. Bronchoscopy with biopsies negative for malignancy 4/2024. Noted to be stable on chest CT 10/16/24.   - Following with pulmonology for significant DEXTER.   - Home health nurse noted intermittent low O2 levels that usually come back up without O2. Currently using supplemental O2 - 2L HS.    (+)                tobacco use (quit 1970), Past use,                       Neurologic: Comment: - h/o concussion  - ataxia     (+)          CVA,                   (-) no TIA   Cardiovascular: Comment:       (+) Dyslipidemia hypertension- -  CAD - past MI - stent-11/6/23.  Drug Eluting Stent. Taking blood thinners   CHF  Last EF: 68 date: 3/2024  DEXTER.                   congenital heart disease PFO.  Previous cardiac testing   Echo: Date: 3/6/24 Results:  Interpretation Summary   Positive bubble study with Valsalva suggestive of small PFO.   Normal left ventricular chamber size, mild concentric hypertrophy and normal systolic function. LVEF 68%. No regional wall motion abnormalities.   Mild grade I diastolic dysfunction with normal filling pressures.   Normal right ventricular chamber size and systolic function. Normal right atrial pressures.   Valves: mild pulmonic regurgitation   No pericardial effusion.   Normal ascending aorta.   Compared to study 11/7/2023, no significant change.     Stress Test:  Date: Results:    ECG Reviewed:  Date: 7/15/24 Results:  Normal sinus rhythm   Low voltage QRS   Borderline ECG     Cath:  Date: 11/6/23 Results:     Indication for procedure - NSTEMI      Post procedure diagnosis - High grade mid LAD and mid RCA stenoses   treated with successful PCI using 3.5 x 16 mm and 4.0 x 20 mm Synergy JOSUE   respectively     RRA - Standard tricia catheters - EBU 3.5 and JR4 6 Fr guides      Recomend DAPT x 12 months     Coronary Findings Diagnostic Dominance: Right   Left  Anterior Descending: Mid LAD lesion is 99% stenosed.   Right Coronary Artery: Prox RCA to Mid RCA lesion is 90% stenosed.         METS/Exercise Tolerance: 1 - Eating, dressing Comment: Significant DEXTER requiring him to stop to catch his breath after about 50 ft   Hematologic: Comments: Latter-day - declines blood tranfusions, he is open to cell saver if needed    (+)      anemia,       (-) history of blood transfusion (Latter-day - declines blood tranfusions, he is open to cell saver if needed)   Musculoskeletal: Comment: - s/p right total shoulder  - spinal stenosis s/p L2-4 laminectomies   - RA vs psoriatic arthritis     - Follows with Dr. Gibbs in rheumatology for granulomatosis with polyangitis managed on rituximab infusions (started in June) and a prednisone taper  (+)  arthritis,             GI/Hepatic:     (+) GERD,            liver disease (hepatic steatosis),       Renal/Genitourinary: Comment: - Urinary retention requiring rubin catheter     (+) renal disease, type: ARF and CRI, Pt does not require dialysis,     BPH,      Endo:     (+)            Chronic steroid usage for  Date most recently used: GPA.        Psychiatric/Substance Use:     (+) psychiatric history other (comment) (insomia)       Infectious Disease:  - neg infectious disease ROS     Malignancy:  - neg malignancy ROS     Other:  - neg other ROS    (+)  , H/O Chronic Pain,           Virtual visit -  No vitals were obtained    Physical Exam  Constitutional: Awake, alert, cooperative, no apparent distress, and appears stated age.  Eyes: Pupils equal, glasses  HENT: Normocephalic, beard  Respiratory: non labored breathing   Neurologic: Awake, alert, oriented to name, place and time.   Neuropsychiatric: Calm, cooperative. Normal affect.      Labs / testing: (personally reviewed)  ontains abnormal data BASIC METABOLIC PANEL  Specimen: Blood - Blood specimen (specimen)  Component  Ref Range & Units 2 wk ago Comments   Sodium  134 -  143 mEq/L 139    Potassium  3.4 - 5.1 mEq/L 4.4    Chloride  99 - 110 mEq/L 105    Carbon Dioxide  19 - 29 mEq/L 26    Anion Gap  3.0 - 15.0 mEq/L 8    Blood Urea Nitrogen  5 - 24 mg/dL 22    Creatinine  0.70 - 1.20 mg/dL 1.83 High     Glomerular Filtration Rate  >60 mL/min/1.73 m*2 36 Low  Risk of cardiovascular disease increases when GFR is abnormal; persistently reduced GFR values are a specific indication of CKD. This calculation uses CKD-EPI 2021 equation without adjustment for race; it has not been validated in pregnant women.   Calcium  8.4 - 10.5 mg/dL 9.3    Glucose  70 - 99 mg/dL 124 High     Resulting Agency [facility]    Narrative  Performed by [facility]  Current ADA criteria for Glucose:      Normal: 70-99 mg/dL      Impaired Fasting Glucose: 100-125 mg/dL      Diabetes Mellitus: at or above 126 mg/dL  The diagnosis of diabetes must be confirmed on a subsequent day by measuring Fasting Plasma Glucose, 2-hr PG or random plasma glucose (if symptoms are present).  Specimen Collected: 11/12/24 10:21 AM    Performed by: [facility] Last Resulted: 11/12/24 11:04 AM   HEMOGRAM  Specimen: Blood - Blood specimen (specimen)  Component  Ref Range & Units 2 mo ago   WBC  3.2 - 11.0 10*9/L 13.1 High    RBC  4.14 - 5.76 10*12/L 4.17   HGB  12.9 - 16.9 g/dL 11.2 Low    HCT  38.4 - 49.7 % 36.4 Low    MCV  81.4 - 99.0 fL 87.3   MCH  26.7 - 33.1 pg 26.9   MCHC  31.6 - 35.5 g/dL 30.8 Low    RDW  11.3 - 14.6 % 15.9 High    PLT  130 - 375 10*9/L 314   Resulting Agency [facility]   Specimen Collected: 09/26/24 11:36 AM    Performed by: [facility] Last Resulted: 09/26/24 11:40 AM     PFTs 11/12/24  Results:   1. Spirometry: Normal.  No significant bronchodilator response.   2.  Lung volumes: Normal.   3.  Diffusing capacity: Moderately decreased.   4.  Flow loop: Normal volume.  Normal amplitude.   Normal/nonscooping   morphology.     CT Chest 10/16/24  IMPRESSION: Overall stable to mildly improved bilateral pulmonary nodules in both upper and lower lobes with the biggest decrease in size involving the left basilar nodule. No new nodules.     Echo 3/6/24  Interpretation Summary   Positive bubble study with Valsalva suggestive of small PFO.   Normal left ventricular chamber size, mild concentric hypertrophy and normal systolic function. LVEF 68%. No regional wall motion abnormalities.   Mild grade I diastolic dysfunction with normal filling pressures.   Normal right ventricular chamber size and systolic function. Normal right atrial pressures.   Valves: mild pulmonic regurgitation   No pericardial effusion.   Normal ascending aorta.   Compared to study 11/7/2023, no significant change.       EKG 7/15/24  Normal sinus rhythm   Low voltage QRS       Coronary angiogram 11/6/23    Indication for procedure - NSTEMI     Post procedure diagnosis - High grade mid LAD and mid RCA stenoses   treated with successful PCI using 3.5 x 16 mm and 4.0 x 20 mm Synergy JOSUE   respectively    RRA - Standard tricia catheters - EBU 3.5 and JR4 6 Fr guides     Recommend DAPT x 12 months         Outside records reviewed from:  Care Everywhere    Assessment    Guevara Jones is a 84 year old male seen as a PAC referral for risk assessment and optimization for anesthesia.    Plan/Recommendations  Pt will be optimized for the proposed procedure.  See below for details on the assessment, risk, and preoperative recommendations    NEUROLOGY  - History of CVA - ataxia - the patient feels that due to his TBI he had the CVA seen on imaging from 2019. There was never an acute event that brought him to the hospital for his CVA though. He continues to have ataxia and uses a walker/cane  ~ The patient has neurology as well and does have balance issues. He has had falls in the past. Fall precautions as indicated.   ~ TBI - history of concussions  2019  - Chronic Pain  On chronic opiates, morphine equivilant = 20 MME/Day   -Post Op delirium risk factors:  Age and High co-morbid index    ENT  - No current airway concerns.  Will need to be reassessed day of surgery.  Mallampati: Unable to assess  TM: Unable to assess    CARDIAC  - CAD  The patient had recent PCI with JOSUE on 11/6/23. The patient reports at that time he could only walk 10 feet and then would get worsening shortness of breath and pain. He went to the hospital and found to have a MI and underwent the PCI with JOSUE. He reports currently no chest pain, chest tightness and is able to walk about 100 feet before his dyspnea on exertion happens.   - CHF  Non- ischemic cardiomyopathy - With preserved EF - EF 68% from echo on 3/2024  - Hypertension  Well controlled  - Hyperlipidemia  Well controlled on home regimen  - Small PFO seen on recent echo  ~ Patient has chronic DEXTER. Seen by cardiology team with last visit by Audra RAMIREZ CNP on 11/6/24 and recommended adding torsemide.  ~ Per telephone note from 11/22/24 the plan going forward as follows:  Recommend increase torsemide to 20 mg twice daily, repeat BMP a week after med change. I think he has a home health nurse. If that doesn't help with dyspnea, then will check with GZ about RHC.  Thanks, Dorothea   The patient reports the increase in torsemide has helped his lower extremity edema greatly but he hasn't noticed any great improvement in his DEXTER. He will continue to follow up with his cardiology team.   - METS (Metabolic Equivalents)  Patient CANNOT perform 4 METS exercise without symptoms             Total Score: 1    Functional Capacity: Unable to complete 4 METS      RCRI-High risk: Class 4  >11% complication reate             Total Score: 3    RCRI: CAD    RCRI: CHF    RCRI: Cerebrovascular Disease         PULMONARY  DIANDRA Medium Risk             Total Score: 3    DIANDRA: Hypertension    DIANDRA: Over 50 ys old    DIANDRA: Male      - Multiple pulmonary  "nodules, GPA: nodularity likely related to his GPA  ~ Progressive dyspnea on exertion - PFTs stable. Patient remains on 2 LPM of O2 at night. Seen by Dr. Bello who discussed with cardiology and recommended follow as as below:  \"2. Progressive dyspnea on exertion: PFT stable and really only notable for reduced DLCO. Unfortunately he did not get the torsemide as prescribed by cardiology. Message sent to cardiology team. I think pending course on diuretics consideration of repeat echo to try to assess RV function versus RHC. Could consider CPX  -- case discussed with Dorothea Garrett NP from cardiology  -- pending improvement with diuretics consider repeat echo to eval RV function versus RHC (isolated low DLCO likely doesn't fully correct unless he has had significant worsening anemia, may be caused by pulmonary hypertension) \"    - Tobacco History    History   Smoking Status    Former    Types: Cigarettes   Smokeless Tobacco    Never   ~ Patient currently has URI. This will have to be resolved before anesthsia.     GI  - GERD  Controlled with digestive supplement.   - hepatic steatosis   PONV Medium Risk  Total Score: 2           1 AN PONV: Patient is not a current smoker    1 AN PONV: Intended Post Op Opioids        /RENAL  - Baseline Creatinine  1.8 - CKD stage 3. History of MALATHI  ~ BPH/urinary retention - attempt at prostate artery embolization which was delayed due to MALATHI and then due to difficult anatomy was not able to be completed. Currently scheduled with Dr. Gaines on 1/9/25 at the [facility]. The patient currently has chronic rubin catheter in place.     ENDOCRINE   - BMI: Estimated body mass index is 31.93 kg/m  as calculated from the following:    Height as of this encounter: 1.727 m (5' 8\").    Weight as of this encounter: 95.3 kg (210 lb).  Obesity (BMI >30)  - No history of Diabetes Mellitus  ~ The patient is on chronic prednisone for his granulomatosis polyangiitis. Will need to consideration for stress " dose steroids.     HEME  VTE Low Risk 0.5%             Total Score: 3    VTE: Greater than 59 yrs old    VTE: Male      - No history of abnormal bleeding or antiplatelet use.  ~ The patient is a [employer]. Not open to blood transfusion but will accept cell saver    MSK  Patient is NOT Frail             Total Score: 2    Frailty: Slower walking speed    Frailty: Overall lack of energy      ~ OA - s/p shoulder replacement  ~ Spinal stenosis -  s/p L2-4 laminectomies     PSYCH  - insomnia - the patient reports this is really only related to being in too much pain to get to sleep.     IMMUNOLOGY  ~ Following with rheumatology with granulomatosis polyangitis/ RA vs psoriatic arthritis. The patient remains on Rituximab every 6 months with last dose on 6/2024- plan for next dose in 1/2025 given prostate surgery. Last seen by rheumatology on 11/12/24 with recommendation for 3 month follow up.     Patient discussed with Dr. Coates    The patient is not yet optimized and acceptable candidate for proposed procedure. Will continue to follow up with the patient with iron studies for anemia. His outside cardiology team is following him closely per telephone notes will plan for repeat BMP and if no improvement in symptoms will then will discuss follow up RHC. Will await his outside cardiology team continued work up for his dyspnea and will await their recommendations on optimization for undergoing his currently scheduled procedure on 1/9/24.       Video-Visit Details    Type of service:  Video Visit    Provider received verbal consent for a Video Visit from the patient? Yes   Video Start Time:  10:20  Video End Time: 10:47    Originating Location (pt. Location): Home    Distant Location (provider location):  Off-site  Mode of Communication:  Video Conference via [employer]  On the day of service:     Prep time: 20 minutes  Visit time: 27 minutes  Documentation time: 24  minutes  ------------------------------------------  Total time: 71 minutes    Meenakshi Davies PA-C    Preoperative Assessment Center  [facility]  Office phone: [phone]  Fax: [phone]

## 2024-11-26 NOTE — PROGRESS NOTES
Flo is a 84 year old who is being evaluated via a billable video visit.    How would you like to obtain your AVS? MyChart  If the video visit is dropped, the invitation should be resent by: Text to cell phone: [phone]    Subjective   Flo is a 84 year old, presenting for the following health issues:  Pre-Op Exam      HPI       Physical Exam

## 2024-12-05 ENCOUNTER — TELEPHONE (OUTPATIENT)
Dept: SURGERY | Facility: CLINIC | Age: 84
End: 2024-12-05
Payer: COMMERCIAL

## 2024-12-05 NOTE — TELEPHONE ENCOUNTER
Per Dorothea Garrett, SELENA - He's at a slight increased risk for cerebral vascular event perioperatively given heart & lung history.  But no pre-procedure cardiac workup is needed.  The judicious use of intra-op fluids are recommended.  Updated MARKIE Tracey.  Hansa Chris RN

## 2024-12-12 PROBLEM — D50.9 IRON DEFICIENCY ANEMIA: Status: ACTIVE | Noted: 2024-12-12

## 2024-12-14 ENCOUNTER — HEALTH MAINTENANCE LETTER (OUTPATIENT)
Age: 84
End: 2024-12-14

## 2024-12-30 ENCOUNTER — TELEPHONE (OUTPATIENT)
Dept: UROLOGY | Facility: CLINIC | Age: 84
End: 2024-12-30
Payer: COMMERCIAL

## 2024-12-30 NOTE — TELEPHONE ENCOUNTER
Spoke with patient's daughter Cara. She notes he fell down his basement stairs and has been hospitalized at [facility] in [place]. He broke several ribs and was admitted to ICU given underlying COPD and increased O2 demand. He just transferred from ICU to med-surg today. The discharge plan is unclear, though he may be transferred to [place] soon.    Of note, prior to this patient was receiving outpatient treatment for anemia. He had 2 iron infusions and his hgb was up to 11, however this has dropped again to the 7's since his admission. Last recheck this AM was 7.9.    RN will send message to Dr. Gaines. His HoLEP will likely need to be delayed while he recovers. Pt's daughter is aware, but final plan TBD by Dr. Gaines.     ANTONIO Marroquin  Care Coordinator- Urology   [phone]

## 2025-01-23 ENCOUNTER — TELEPHONE (OUTPATIENT)
Dept: UROLOGY | Facility: CLINIC | Age: 85
End: 2025-01-23
Payer: COMMERCIAL

## 2025-01-28 ENCOUNTER — TELEPHONE (OUTPATIENT)
Dept: UROLOGY | Facility: CLINIC | Age: 85
End: 2025-01-28
Payer: COMMERCIAL

## 2025-02-27 NOTE — TELEPHONE ENCOUNTER
Reason for visit: HoLEP consult  - Procedure scheduled for 3/31/2025      Records/imaging/labs/orders: In EPIC     Pt called: No need for a call     At Rooming: Standard, AUFABRIZIO questionnaire sent    Dai Nichole on 2/27/2025 at 8:24 AM

## 2025-03-04 ENCOUNTER — PRE VISIT (OUTPATIENT)
Dept: UROLOGY | Facility: CLINIC | Age: 85
End: 2025-03-04

## 2025-03-04 ENCOUNTER — VIRTUAL VISIT (OUTPATIENT)
Dept: UROLOGY | Facility: CLINIC | Age: 85
End: 2025-03-04
Payer: COMMERCIAL

## 2025-03-04 DIAGNOSIS — R33.9 URINARY RETENTION: Primary | ICD-10-CM

## 2025-03-04 PROCEDURE — 98006 SYNCH AUDIO-VIDEO EST MOD 30: CPT | Performed by: UROLOGY

## 2025-03-04 PROCEDURE — 1126F AMNT PAIN NOTED NONE PRSNT: CPT | Mod: 95 | Performed by: UROLOGY

## 2025-03-04 NOTE — NURSING NOTE
Current patient location: [address] [facility] [address]    Is the patient currently in the state of [place]? YES    Visit mode: VIDEO    If the visit is dropped, the patient can be reconnected by:VIDEO VISIT: Text to cell phone:   Telephone Information:   Mobile [phone]       Will anyone else be joining the visit? NO  (If patient encounters technical issues they should call [phone]    Are changes needed to the allergy or medication list? No    Are refills needed on medications prescribed by this physician? NO    Rooming Documentation:  Not applicable    Reason for visit: AWILDA PARTIDA

## 2025-03-04 NOTE — PROGRESS NOTES
Virtual Visit Details    Type of service:  Video Visit   Video Start Time:  3:!5  Video End Time: 3:30    Originating Location (pt. Location): Home    Distant Location (provider location):  Off-site  Platform used for Video Visit: [facility]          UROLOGY OUTPATIENT VISIT      Chief Complaint:   Urinary retention      Synopsis    Guevara Jones is a very pleasant AGE: 85 year old year old person.  He is being seen in follow-up of urinary retention    His medical history is very complex.  He previously failed prostate artery embolization given difficult anatomy and MALATHI.  He is a Shinto and would not accept blood transfusion products if needed.  His prostate has been estimated at 200 g.  We have previously tentatively scheduled holmium laser enucleation however this is complicated by a fall which resulted in numerous rib injuries and respiratory failure as well as a prolonged hospitalization from which she became severely deconditioned.  He is now getting his strength back but unfortunately his hemoglobin had dropped and his baseline anemia had returned.  He has been on iron infusions in an effort to get his hemoglobin levels up.    He continues to struggle with his urinary catheter although he has not had a urinary infection several months.  It is somewhat uncomfortable to him and his preference would be to have the catheter removed although he has failed several voiding trials in the past.         Medications     Current Outpatient Medications   Medication Sig Dispense Refill    acetaminophen (TYLENOL) 325 MG tablet Take 2 tablets (650 mg) by mouth every 4 hours as needed for pain (Patient taking differently: Take 650 mg by mouth 3 times daily.) 60 tablet 0    Alfalfa 250 MG TABS 2 tablets (Patient not taking: Reported on 11/21/2024)      amLODIPine (NORVASC) 5 MG tablet Take 5 mg by mouth 2 times daily.      Ascorbic Acid (VITAMIN C) POWD Take 1 tablet by mouth 3 times daily.      Aspirin 81 MG CAPS  "Take 81 mg by mouth every morning.      cod liver oil CAPS capsule Take 1 capsule by mouth every morning. OMEGA 3 FISH OIL      Digestive Enzymes CAPS Take 1 capsule by mouth 2 times daily.      diphenhydrAMINE-acetaminophen (TYLENOL PM)  MG tablet Take 1 tablet by mouth at bedtime.      ezetimibe (ZETIA) 10 MG tablet Take 10 mg by mouth daily.      gabapentin (NEURONTIN) 600 MG tablet Take 600 mg by mouth 2 times daily 1 in the morning, two in the evening      gentamicin (GARAMYCIN) 0.3 % ophthalmic solution Place 1 drop into both eyes every 4 hours.      Glucosamine-Chondroitin--300-250 MG TABS Take by mouth every morning. (Patient not taking: Reported on 11/26/2024)      HYDROcodone-acetaminophen (NORCO) 5-325 MG tablet Take 1 tablet by mouth every 6 hours as needed for severe pain (decrease number of tablets as pain improves) 28 tablet 0    LIPITOR 40 MG tablet Take 40 mg by mouth every evening.      neomycin-polymyxin-dexAMETHasone (MAXITROL) 3.5-25386-6.1 SUSP ophthalmic susp Place 1 drop into both eyes 4 times daily.      nitroGLYcerin (NITROSTAT) 0.4 MG sublingual tablet Place 0.4 mg under the tongue every 5 minutes as needed for chest pain.      PHOSPHATIDYL CHOLINE PO Take by mouth as needed. (Patient not taking: Reported on 11/26/2024)      pregabalin (LYRICA) 75 MG capsule Take 150 mg by mouth 2 times daily.      red yeast rice 600 MG CAPS Take 600 mg by mouth daily (Patient not taking: Reported on 11/21/2024)      senna-docusate (SENOKOT-S/PERICOLACE) 8.6-50 MG tablet Take 1 tablet by mouth daily (Patient taking differently: Take 1 tablet by mouth 2 times daily.) 30 tablet 0    tamsulosin (FLOMAX) 0.4 MG capsule Take 0.4 mg by mouth 2 times daily.      torsemide (DEMADEX) 20 MG tablet Take 20 mg by mouth 2 times daily.      Turmeric (CURCUMIN 95) 500 MG CAPS Take 750 mg by mouth 2 times daily.      UNABLE TO FIND Take 1 tablet by mouth every morning. MEDICATION NAME: MICHAEL FOOD \"IRON BLOOD " "BUILDER\"  SUPPLEMENT PLANT BASED       No current facility-administered medications for this visit.                    Assessment/Plan   85 year old year old person with large prostate and urinary retention, Pentecostal  -We discussed the nature of bladder outlet obstruction procedures in the setting of a very large prostate and urinary retention.  I believe that he would be most well-suited for holmium laser enucleation should he pursue surgery however we had a detailed discussion regarding some of the potential morbidity of the procedure especially in light of him being Pentecostal and had a baseline anemia with exposure to anticoagulants.  We discussed that if he was to be in them 1 to 3% of patients with significant hematuria postoperatively he does not have a high tolerance for blood loss and could very adverse outcome from bleeding including possible death.  He would like to consider management options further and will get back to us as to whether or not he would like to proceed.  Otherwise it continues to look with catheter he has a local urologist with whom he can follow-up.  He will let us know how he would like to proceed in the coming days      CC:  Steve Ryder    "

## 2025-03-04 NOTE — LETTER
3/4/2025       RE: Guevara Jones  [address]  [facility] [address]     Dear Colleague,    Thank you for referring your patient, Guevara Jones, to the [facility] UROLOG[facility] [place] at [facility]. Please see a copy of my visit note below.    Virtual Visit Details    Type of service:  Video Visit   Video Start Time:  3:!5  Video End Time: 3:30    Originating Location (pt. Location): Home    Distant Location (provider location):  Off-site  Platform used for Video Visit: [facility]          UROLOGY OUTPATIENT VISIT      Chief Complaint:   Urinary retention      Synopsis    Guevara Jones is a very pleasant AGE: 85 year old year old person.  He is being seen in follow-up of urinary retention    His medical history is very complex.  He previously failed prostate artery embolization given difficult anatomy and MALATHI.  He is a Sikhism and would not accept blood transfusion products if needed.  His prostate has been estimated at 200 g.  We have previously tentatively scheduled holmium laser enucleation however this is complicated by a fall which resulted in numerous rib injuries and respiratory failure as well as a prolonged hospitalization from which she became severely deconditioned.  He is now getting his strength back but unfortunately his hemoglobin had dropped and his baseline anemia had returned.  He has been on iron infusions in an effort to get his hemoglobin levels up.    He continues to struggle with his urinary catheter although he has not had a urinary infection several months.  It is somewhat uncomfortable to him and his preference would be to have the catheter removed although he has failed several voiding trials in the past.         Medications     Current Outpatient Medications   Medication Sig Dispense Refill     acetaminophen (TYLENOL) 325 MG tablet Take 2 tablets (650 mg) by mouth every 4 hours as needed for pain (Patient  taking differently: Take 650 mg by mouth 3 times daily.) 60 tablet 0     Alfalfa 250 MG TABS 2 tablets (Patient not taking: Reported on 11/21/2024)       amLODIPine (NORVASC) 5 MG tablet Take 5 mg by mouth 2 times daily.       Ascorbic Acid (VITAMIN C) POWD Take 1 tablet by mouth 3 times daily.       Aspirin 81 MG CAPS Take 81 mg by mouth every morning.       cod liver oil CAPS capsule Take 1 capsule by mouth every morning. OMEGA 3 FISH OIL       Digestive Enzymes CAPS Take 1 capsule by mouth 2 times daily.       diphenhydrAMINE-acetaminophen (TYLENOL PM)  MG tablet Take 1 tablet by mouth at bedtime.       ezetimibe (ZETIA) 10 MG tablet Take 10 mg by mouth daily.       gabapentin (NEURONTIN) 600 MG tablet Take 600 mg by mouth 2 times daily 1 in the morning, two in the evening       gentamicin (GARAMYCIN) 0.3 % ophthalmic solution Place 1 drop into both eyes every 4 hours.       Glucosamine-Chondroitin--300-250 MG TABS Take by mouth every morning. (Patient not taking: Reported on 11/26/2024)       HYDROcodone-acetaminophen (NORCO) 5-325 MG tablet Take 1 tablet by mouth every 6 hours as needed for severe pain (decrease number of tablets as pain improves) 28 tablet 0     LIPITOR 40 MG tablet Take 40 mg by mouth every evening.       neomycin-polymyxin-dexAMETHasone (MAXITROL) 3.5-81057-3.1 SUSP ophthalmic susp Place 1 drop into both eyes 4 times daily.       nitroGLYcerin (NITROSTAT) 0.4 MG sublingual tablet Place 0.4 mg under the tongue every 5 minutes as needed for chest pain.       PHOSPHATIDYL CHOLINE PO Take by mouth as needed. (Patient not taking: Reported on 11/26/2024)       pregabalin (LYRICA) 75 MG capsule Take 150 mg by mouth 2 times daily.       red yeast rice 600 MG CAPS Take 600 mg by mouth daily (Patient not taking: Reported on 11/21/2024)       senna-docusate (SENOKOT-S/PERICOLACE) 8.6-50 MG tablet Take 1 tablet by mouth daily (Patient taking differently: Take 1 tablet by mouth 2 times  "daily.) 30 tablet 0     tamsulosin (FLOMAX) 0.4 MG capsule Take 0.4 mg by mouth 2 times daily.       torsemide (DEMADEX) 20 MG tablet Take 20 mg by mouth 2 times daily.       Turmeric (CURCUMIN 95) 500 MG CAPS Take 750 mg by mouth 2 times daily.       UNABLE TO FIND Take 1 tablet by mouth every morning. MEDICATION NAME: MICHAEL FOOD \"IRON BLOOD BUILDER\"  SUPPLEMENT PLANT BASED       No current facility-administered medications for this visit.                    Assessment/Plan   85 year old year old person with large prostate and urinary retention, Church  -We discussed the nature of bladder outlet obstruction procedures in the setting of a very large prostate and urinary retention.  I believe that he would be most well-suited for holmium laser enucleation should he pursue surgery however we had a detailed discussion regarding some of the potential morbidity of the procedure especially in light of him being Church and had a baseline anemia with exposure to anticoagulants.  We discussed that if he was to be in them 1 to 3% of patients with significant hematuria postoperatively he does not have a high tolerance for blood loss and could very adverse outcome from bleeding including possible death.  He would like to consider management options further and will get back to us as to whether or not he would like to proceed.  Otherwise it continues to look with catheter he has a local urologist with whom he can follow-up.  He will let us know how he would like to proceed in the coming days      CC:  Steve Ryder      Again, thank you for allowing me to participate in the care of your patient.      Sincerely,    Jorgito Gaines MD    "

## 2025-03-10 ENCOUNTER — TELEPHONE (OUTPATIENT)
Dept: UROLOGY | Facility: CLINIC | Age: 85
End: 2025-03-10
Payer: COMMERCIAL

## 2025-03-10 NOTE — TELEPHONE ENCOUNTER
Calling to confirm with patient that he is agreeable to hold off on surgery Monday March 31 2025     Patient wants to hold off and will reach back out when he can get his labs back up.     Case removed from OR schedule.

## 2025-03-25 ENCOUNTER — TELEPHONE (OUTPATIENT)
Dept: UROLOGY | Facility: CLINIC | Age: 85
End: 2025-03-25
Payer: COMMERCIAL

## 2025-03-25 NOTE — TELEPHONE ENCOUNTER
[facility] Call Center    Phone Message    May a detailed message be left on voicemail: yes     Reason for Call: Appointment Intake    Referring Provider Name: Writer forgot to ask name of doctor, sorry  Diagnosis and/or Symptoms: Patient was just released today from [facility] in [place] and doctor wanted him seen by urologist within 2 weeks for cystocele and Epididymitis. Please call Ha back from [facility] to schedule. Writer wasn't able to pull anything up for Liana until June. Thank you!    Action Taken: Message routed to:  [facility] Uro    Travel Screening: Not Applicable     Date of Service:

## 2025-03-26 NOTE — TELEPHONE ENCOUNTER
Spoke with patient's daughter Cara, as he had been discharged from [facility] at the time of this phone call. Relayed Dr. Gaines's recommendations as below. They will start with Dr. Camarillo's clinic in [place] as it is much closer to them. They will call back if an appointment with either Dr. Malik or Dr. Peuntes is needed.    ANTONIO Marroquin  Care Coordinator- Urology   [phone]

## 2025-04-09 ENCOUNTER — TRANSFERRED RECORDS (OUTPATIENT)
Dept: HEALTH INFORMATION MANAGEMENT | Facility: CLINIC | Age: 85
End: 2025-04-09
Payer: COMMERCIAL

## 2025-04-23 DIAGNOSIS — N40.0 ENLARGED PROSTATE: Primary | ICD-10-CM

## 2025-04-24 ENCOUNTER — TELEPHONE (OUTPATIENT)
Dept: UROLOGY | Facility: CLINIC | Age: 85
End: 2025-04-24
Payer: COMMERCIAL

## 2025-04-24 NOTE — TELEPHONE ENCOUNTER
HOLEP     Patient scheduled for a VV with Dr Mena to meet and discuss HOLEP Monday May 5 [id number]    Spoke with: patient and Patients daughter Cara       Date of surgery: Wednesday  May 14 2025 with Dr Mena       Location: [place]       Informed patient they will need a adult : 23 hr obs        H&P Scheduled in PAC- Patient scheduled for a VV PAC on 5/7/25 11am        Pre procedure covid :Not req      Additional imaging: NA        Surgery Packet : Sent via [facility]       Additional comments: Please call for surgery teaching

## 2025-04-29 NOTE — TELEPHONE ENCOUNTER
FUTURE VISIT INFORMATION      SURGERY INFORMATION:  Date: 2025   Location: [facility] OR   Surgeon:  Long Mena MD   Anesthesia Type:  Choice   Procedure: Holmium Laser Enucleation of the Prostate   Consult: 25    RECORDS REQUESTED FROM:       Primary Care Provider: Steve Ryder DO     Pertinent Medical History: Enlarged prostate; (HFpEF) heart failure with preserved ejection fraction (H); Anemia; BPH; CAD; CKD; DEXTER; Edema; Gastroesophageal reflux disease with esophagitis; Hepatic steatosis; History of CVA; HLD; HTN; PFO; Pulmonary nodules; Urinary retention; Iron deficiency anemia;     Most recent EKG+ Tracin/15/24    Most recent ECHO: 3/6/24    Most recent Cardiac Stress Test: 17    Most recent Coronary Angiogram: 23    Most recent PFT's: 24

## 2025-04-30 ENCOUNTER — TELEPHONE (OUTPATIENT)
Dept: UROLOGY | Facility: CLINIC | Age: 85
End: 2025-04-30
Payer: COMMERCIAL

## 2025-04-30 DIAGNOSIS — R33.9 URINARY RETENTION: Primary | ICD-10-CM

## 2025-04-30 DIAGNOSIS — Z22.39 VRE (VANCOMYCIN RESISTANT ENTEROCOCCUS) CULTURE POSITIVE: ICD-10-CM

## 2025-04-30 NOTE — CONFIDENTIAL NOTE
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

## 2025-05-01 ENCOUNTER — PRE VISIT (OUTPATIENT)
Dept: INFECTIOUS DISEASES | Facility: CLINIC | Age: 85
End: 2025-05-01
Payer: COMMERCIAL

## 2025-05-01 ENCOUNTER — OFFICE VISIT (OUTPATIENT)
Dept: INFECTIOUS DISEASES | Facility: CLINIC | Age: 85
End: 2025-05-01
Attending: INTERNAL MEDICINE
Payer: MEDICARE

## 2025-05-01 VITALS
HEIGHT: 68 IN | HEART RATE: 70 BPM | OXYGEN SATURATION: 91 % | SYSTOLIC BLOOD PRESSURE: 117 MMHG | DIASTOLIC BLOOD PRESSURE: 66 MMHG | WEIGHT: 205.56 LBS | BODY MASS INDEX: 31.15 KG/M2

## 2025-05-01 DIAGNOSIS — R33.9 URINARY RETENTION: ICD-10-CM

## 2025-05-01 DIAGNOSIS — Z22.39 VRE (VANCOMYCIN RESISTANT ENTEROCOCCUS) CULTURE POSITIVE: ICD-10-CM

## 2025-05-01 PROCEDURE — G0463 HOSPITAL OUTPT CLINIC VISIT: HCPCS | Performed by: INTERNAL MEDICINE

## 2025-05-01 ASSESSMENT — PAIN SCALES - GENERAL: PAINLEVEL_OUTOF10: MODERATE PAIN (6)

## 2025-05-01 NOTE — PROGRESS NOTES
"  [facility] INFECTIOUS DISEASE CLINIC [address] [address]  Phone: [phone]  Fax: [phone]    Patient:  Guevara Jones, Date of birth 1940  Date of Visit:  05/01/2025  Referring Provider Long Mena        Guevara Jones is here today for a referral for \"MDR VRE urinary colonization; all options resistant or intermediate. Need recommendations for perioperative antibiotics for endoscopic prostate enucleation\"      Assessment & Plan/Recommendations     ID problem list:  VRE colonization (E.faecium)  Based on last urine culture 4/9/25 [facility] that was sent off of a chronic indwelling rubin catheter  BPH, urinary retention  Has chronic rubin, gets exchanged monthly, last exchanged ~4/10 or 4/11 per patient report  Planned for holmium laser enucleation with urology    Discussion:   Given that extended susceptibilities were not run by [facility] micro lab, based on local antibiogram (for [facility]), linezolid would be a reasonable empiric option to cover for E.faecium/VRE coverage (97%) for empiric brent-op antibitotics in addition to routine perioperative antibiotics for usual GNR coverage. However, based on patient history, it does seem that this 4/9 urine culture was sent off of a old rubin catheter that had been in place for ~1 month, thus it is possible that the VRE is chronically colonizing the rubin and not clinically relevant in his case. If a more accurate urine sample was desired to detect bacteria colonizing the urinary tract (and not just colonizing the chronic rubin), ideally it would be sent off of a new rubin catheter. He otherwise has no current signs of UTI so no antibiotics indicated now.        Recs:  1.  If desiring to cover prior 4/9 urine culture E.faecium (VRE) for brent-operative antibiotics, then PO linezolid would be a reasonable choice (in addition to routine perioperative antibiotics for routine GNR coverage)  2.  " Since his 4/9 cultures were sent off of an old rubin catheter, if more accurate urine culture results are desired (and to decrease likelihood of results representing chronic rubin catheter colonization), then ideally would collect urine sample after exchanging rubin if indicated (or alternatively off of straight catheterization sample)  3.  Follow up with urology for remaining management         I communicated with the patient and his friend at this visit.      Patient was seen on 05/01/2025 in ID clinic.    60 minutes spent by me on the date of the encounter doing chart review, history and exam, documentation and further activities per the note         Ilan Jaimes MD  Infectious Diseases      Subjective       HPI:  Guevara Jones is a 85 year old male with PMH including granulomatosis with polyangiitis, CKD, BPH, urinary retention, chronic rubin, who presents to ID clinic due to VRE colonization with planned upcoming urologic procedure as referral from his urologist for recommendations on brent-op antibiotics.       Planned for upcoming urologic procedure 5/14. Has chronic indwelling rubin catheter and he has been following in [facility] for past year with multiple different antibiotic courses over past year for multiple prior MDR organisms growing in urine cultuers there (he has seen [facility] as well in recent past for this). No irritation around rubin currently, no pain of bladder or kidneys. Last rubin exchange ~4/10 or 4/11 and gets it exchanged 1x/month. He thinks that the urine cultures checked 4/9 were done off of the old rubin catheter prior to it getting changed later that same week. Currently not on any antibiotics, but previously was on Bactrim + Cipro 14-day course (3/25-4/8) as was reportedly prescribed by his [facility] providers due to concern for UTI at that time. No fevers/chills, no GI complaints.             PAST MEDICAL HISTORY  Past Medical History:   Diagnosis Date    (HFpEF) heart  "failure with preserved ejection fraction (H)     Anemia     Ataxia     BPH (benign prostatic hyperplasia)     CAD (coronary artery disease)     CKD (chronic kidney disease) stage 3, GFR 30-59 ml/min (H)     DEXTER (dyspnea on exertion)     Edema     Gastroesophageal reflux disease with esophagitis     Granulomatosis with polyangiitis (H)     Hepatic steatosis     History of concussion     History of CVA (cerebrovascular accident)     HLD (hyperlipidemia)     HTN (hypertension)     Insomnia     Obesity     Osteoarthritis     PFO (patent foramen ovale)     Pulmonary nodules     Spinal stenosis of lumbar region with neurogenic claudication     Urinary retention      Otherwise as per HPI    PAST SURGICAL HISTORY  Past Surgical History:   Procedure Laterality Date    APPENDECTOMY      LAMINECTOMY LUMBAR POSTERIOR MICROSCOPIC TWO LEVELS N/A 2021    Procedure: Open laminectomies lumbar 2-4, Repair of Dural Tear.  ;  Surgeon: Shaka Manriquez MD;  Location: [facility] OR    TOTAL SHOULDER ARTHROPLASTY Right      Otherwise as per HPI    ALLERGIES AND DRUG REACTIONS  Allergies   Allergen Reactions    Latex Hives     Other Reaction(s): itchy red rash/welts    Lentil Other (See Comments)       FAMILY HISTORY  No known immunocompromising conditions or infections unless listed below  family history includes Cancer in his father; Heart Disease in his mother.    SOCIAL AND FUNCTIONAL HISTORY  Social History     Tobacco Use    Smoking status: Former     Current packs/day: 0.00     Types: Cigarettes     Quit date:      Years since quittin.3     Passive exposure: Past    Smokeless tobacco: Never   Substance Use Topics    Alcohol use: Not Currently    Drug use: Not Currently     Types: Psilocybin     Otherwise as per HPI    CURRENT ANTIMICROBIALS  Other medications reviewed in EPIC  N/a    REVIEW OF SYSTEMS  ROS as above.      Objective   PHYSICAL EXAMINATION     height is 1.727 m (5' 8\") and weight is 93.2 kg (205 lb 9 " oz). His blood pressure is 117/66 and his pulse is 70. His oxygen saturation is 91% (abnormal).     Constitutional:  appearance not in acute distress, sitting in wheelchair  Eyes: no conjunctival injection   ENT: no nasal discharge, membranes moist-appearing  Pulmonary: unlabored breathing on room air  Gastrointestinal: abdomen non-distended, soft, non-tender   Genitourinary: no suprapubic tenderness, no CVA tenderness, rubin with clear/yellow urine  Skin: no rashes visible on exposed skin  Neurologic: awake, alert and interactive    Other Significant Information (Labs, cultures, radiology, etc)      Recent micro reviewed:

## 2025-05-01 NOTE — NURSING NOTE
"Chief Complaint   Patient presents with    Consult     Urinary Retention , VRE culture positive,      /66 (BP Location: Left arm, Cuff Size: Adult Large)   Pulse 70   Ht 1.727 m (5' 8\")   Wt 93.2 kg (205 lb 9 oz)   SpO2 (!) 91%   BMI 31.26 kg/m    Sherri Ashford on 5/1/2025 at 12:54 PM    "

## 2025-05-01 NOTE — LETTER
"5/1/2025       RE: Guevara Jones  [address]  [facility] [address]     Dear Colleague,    Thank you for referring your patient, Guevara Jones, to the [facility] [place] at [facility]. Please see a copy of my visit note below.      [facility] [place]  [address] [address]  Phone: [phone]  Fax: [phone]    Patient:  Guevara Jones, Date of birth 1940  Date of Visit:  05/01/2025  Referring Provider Long Mena        Guevara Jones is here today for a referral for \"MDR VRE urinary colonization; all options resistant or intermediate. Need recommendations for perioperative antibiotics for endoscopic prostate enucleation\"      Assessment & Plan/Recommendations     ID problem list:  VRE colonization (E.faecium)  Based on last urine culture 4/9/25 from [facility] ([facility]) that was sent off of a chronic indwelling rubin catheter  BPH, urinary retention  Has chronic rubin, gets exchanged monthly, last exchanged ~4/10 or 4/11 per patient report  Planned for holmium laser enucleation with urology    Discussion:   Given that extended susceptibilities were not run by [facility] micro lab, based on local antibiogram (for [facility]), linezolid would be a reasonable empiric option to cover for E.faecium/VRE coverage (97%) for empiric brent-op antibitotics in addition to routine perioperative antibiotics for usual GNR coverage. However, based on patient history, it does seem that this 4/9 urine culture was sent off of a old rubin catheter that had been in place for ~1 month, thus it is possible that the VRE is chronically colonizing the rubin and not clinically relevant in his case. If a more accurate urine sample was desired to detect bacteria colonizing the urinary tract (and not just colonizing the chronic rubin), ideally it would be sent off of a new rubin " catheter. He otherwise has no current signs of UTI so no antibiotics indicated now.        Recs:  1.  If desiring to cover prior 4/9 urine culture E.faecium (VRE) for brent-operative antibiotics, then PO linezolid would be a reasonable choice (in addition to routine perioperative antibiotics for routine GNR coverage)  2.  Since his 4/9 cultures were sent off of an old rubin catheter, if more accurate urine culture results are desired (and to decrease likelihood of results representing chronic rubin catheter colonization), then ideally would collect urine sample after exchanging rubin if indicated (or alternatively off of straight catheterization sample)  3.  Follow up with urology for remaining management         I communicated with the patient and his friend at this visit.      Patient was seen on 05/01/2025 in ID clinic.    60 minutes spent by me on the date of the encounter doing chart review, history and exam, documentation and further activities per the note         Ilan Jaimes MD  Infectious Diseases      Subjective       HPI:  Guevara Jones is a 85 year old male with PMH including granulomatosis with polyangiitis, CKD, BPH, urinary retention, chronic rubin, who presents to ID clinic due to VRE colonization with planned upcoming urologic procedure as referral from his urologist for recommendations on brent-op antibiotics.       Planned for upcoming urologic procedure 5/14. Has chronic indwelling rubin catheter and he has been following in [facility] for past year with multiple different antibiotic courses over past year for multiple prior MDR organisms growing in urine cultuers there (he has seen [facility] ID as well in recent past for this). No irritation around rubin currently, no pain of bladder or kidneys. Last rubin exchange ~4/10 or 4/11 and gets it exchanged 1x/month. He thinks that the urine cultures checked 4/9 were done off of the old rubin catheter prior to it getting changed later that same  week. Currently not on any antibiotics, but previously was on Bactrim + Cipro 14-day course (3/25-) as was reportedly prescribed by his [facility] providers due to concern for UTI at that time. No fevers/chills, no GI complaints.             PAST MEDICAL HISTORY  Past Medical History:   Diagnosis Date     (HFpEF) heart failure with preserved ejection fraction (H)      Anemia      Ataxia      BPH (benign prostatic hyperplasia)      CAD (coronary artery disease)      CKD (chronic kidney disease) stage 3, GFR 30-59 ml/min (H)      DEXTER (dyspnea on exertion)      Edema      Gastroesophageal reflux disease with esophagitis      Granulomatosis with polyangiitis (H)      Hepatic steatosis      History of concussion      History of CVA (cerebrovascular accident)      HLD (hyperlipidemia)      HTN (hypertension)      Insomnia      Obesity      Osteoarthritis      PFO (patent foramen ovale)      Pulmonary nodules      Spinal stenosis of lumbar region with neurogenic claudication      Urinary retention      Otherwise as per HPI    PAST SURGICAL HISTORY  Past Surgical History:   Procedure Laterality Date     APPENDECTOMY       LAMINECTOMY LUMBAR POSTERIOR MICROSCOPIC TWO LEVELS N/A 2021    Procedure: Open laminectomies lumbar 2-4, Repair of Dural Tear.  ;  Surgeon: Shaka Manriquez MD;  Location: [facility] OR     TOTAL SHOULDER ARTHROPLASTY Right      Otherwise as per HPI    ALLERGIES AND DRUG REACTIONS  Allergies   Allergen Reactions     Latex Hives     Other Reaction(s): itchy red rash/welts     Lentil Other (See Comments)       FAMILY HISTORY  No known immunocompromising conditions or infections unless listed below  family history includes Cancer in his father; Heart Disease in his mother.    SOCIAL AND FUNCTIONAL HISTORY  Social History     Tobacco Use     Smoking status: Former     Current packs/day: 0.00     Types: Cigarettes     Quit date: 1970     Years since quittin.3     Passive exposure: Past      "Smokeless tobacco: Never   Substance Use Topics     Alcohol use: Not Currently     Drug use: Not Currently     Types: Psilocybin     Otherwise as per HPI    CURRENT ANTIMICROBIALS  Other medications reviewed in EPIC  N/a    REVIEW OF SYSTEMS  ROS as above.      Objective   PHYSICAL EXAMINATION     height is 1.727 m (5' 8\") and weight is 93.2 kg (205 lb 9 oz). His blood pressure is 117/66 and his pulse is 70. His oxygen saturation is 91% (abnormal).     Constitutional:  appearance not in acute distress, sitting in wheelchair  Eyes: no conjunctival injection   ENT: no nasal discharge, membranes moist-appearing  Pulmonary: unlabored breathing on room air  Gastrointestinal: abdomen non-distended, soft, non-tender   Genitourinary: no suprapubic tenderness, no CVA tenderness, rubin with clear/yellow urine  Skin: no rashes visible on exposed skin  Neurologic: awake, alert and interactive    Other Significant Information (Labs, cultures, radiology, etc)      Recent micro reviewed:           Again, thank you for allowing me to participate in the care of your patient.      Sincerely,    Ilan Jaimes MD    "

## 2025-05-05 ENCOUNTER — VIRTUAL VISIT (OUTPATIENT)
Dept: UROLOGY | Facility: CLINIC | Age: 85
End: 2025-05-05
Payer: COMMERCIAL

## 2025-05-05 DIAGNOSIS — N40.1 BENIGN PROSTATIC HYPERPLASIA WITH WEAK URINARY STREAM: Primary | ICD-10-CM

## 2025-05-05 DIAGNOSIS — D50.8 OTHER IRON DEFICIENCY ANEMIA: ICD-10-CM

## 2025-05-05 DIAGNOSIS — R39.12 BENIGN PROSTATIC HYPERPLASIA WITH WEAK URINARY STREAM: Primary | ICD-10-CM

## 2025-05-05 ASSESSMENT — PAIN SCALES - GENERAL: PAINLEVEL_OUTOF10: MODERATE PAIN (4)

## 2025-05-05 NOTE — PROGRESS NOTES
Virtual Visit Details    Type of service:  Video Visit   Video Start Time: 12:30 PM  Video End Time:12:48PM    Originating Location (pt. Location): Home    Distant Location (provider location):  On-site  Platform used for Video Visit: Unable to complete video visit; patient could not get video to work    Patient is 85-year-old male with a history of a enlarged prostate greater than 200 cm with catheter dependent urinary retention. He has seen Dr. Nuñez in the past, most recently March 4, 2025.  He has a medical history significant for hypertension, hyperlipidemia, MALATHI, polyangiitis on immunosuppression, coronary artery disease and anemia.  He was set up for HoLEP in March, but he sustained a fall and dropped his hemoglobin significantly.  Since that time, he has been receiving IV iron and per patient's notation his last hemoglobin was 14.  He has recovered well from the fall.    He was recently admitted from March 17 through March 25 for left epididymitis likely due to his catheter.  Urine culture from that time was Enterobacter cloacae and Pseudomonas aeruginosa.  He recently had a urine culture performed which was VRE.  He saw infectious disease last week who recommended linezolid if repeat urine culture review catheter demonstrates this.    He is also a [employer] and does not consent to any blood transfusion.    I discussed with the patient regarding HoLEP.  Discussed risks including bleeding, infection, damage to urethra or bladder.  For him, infection and bleeding are the largest considerations given recent urinary tract infection, VRE colonization of the bladder/catheter.  Regarding bleeding, will avoid blood transfusion.  A 1 to 2% chance of his hemoglobin dropping below the level where a blood transfusion would ordinarily be indicated.  Will use tranexamic acid to mitigate his risk of intraoperative and postoperative blood loss.    Also discussed options of chronic indwelling catheterization,  intermittent catheterization or consideration for PAE the [facility].  He would like to proceed with HoLEP.    Plan:  - Proceed with HoLEP next week May 14, 2025  - Catheter changed this week at PCP with a repeat urine culture    I discussed the plan with patient as called his daughter separately to go over the plan as well.  Both understanding and agreement.

## 2025-05-05 NOTE — NURSING NOTE
Current patient location: [address]  [facility] [address]    Is the patient currently in the state of [place]? YES    Visit mode: VIDEO    If the visit is dropped, the patient can be reconnected by:VIDEO VISIT: Text to cell phone:   Telephone Information:   Mobile [phone]       Will anyone else be joining the visit? NO  (If patient encounters technical issues they should call [phone]    Are changes needed to the allergy or medication list? No    Are refills needed on medications prescribed by this physician? NO    Rooming Documentation:  Questionnaire(s) completed    Reason for visit: RECHECK    Anabell BRIGHTF

## 2025-05-06 ENCOUNTER — TELEPHONE (OUTPATIENT)
Dept: UROLOGY | Facility: CLINIC | Age: 85
End: 2025-05-06
Payer: COMMERCIAL

## 2025-05-06 DIAGNOSIS — N39.0 URINARY TRACT INFECTION: Primary | ICD-10-CM

## 2025-05-06 NOTE — CONFIDENTIAL NOTE
OC to pt and to the local pcp office to coordinate a cath change and sterile urine collection prior to surgery on  05/14/2025.  Pt updated and uc orders faxed to [phone].  ANTONIO Ivey  Care Coordinator Urology  [phone]

## 2025-05-07 ENCOUNTER — ANESTHESIA EVENT (OUTPATIENT)
Dept: SURGERY | Facility: CLINIC | Age: 85
End: 2025-05-07
Payer: MEDICARE

## 2025-05-07 ENCOUNTER — VIRTUAL VISIT (OUTPATIENT)
Dept: SURGERY | Facility: CLINIC | Age: 85
End: 2025-05-07
Payer: COMMERCIAL

## 2025-05-07 ENCOUNTER — PRE VISIT (OUTPATIENT)
Dept: SURGERY | Facility: CLINIC | Age: 85
End: 2025-05-07

## 2025-05-07 VITALS — HEIGHT: 68 IN | BODY MASS INDEX: 31.07 KG/M2 | WEIGHT: 205 LBS

## 2025-05-07 DIAGNOSIS — D50.9 IRON DEFICIENCY ANEMIA, UNSPECIFIED IRON DEFICIENCY ANEMIA TYPE: ICD-10-CM

## 2025-05-07 DIAGNOSIS — N40.0 ENLARGED PROSTATE: ICD-10-CM

## 2025-05-07 DIAGNOSIS — Z01.818 PRE-OP EVALUATION: Primary | ICD-10-CM

## 2025-05-07 PROCEDURE — 98007 SYNCH AUDIO-VIDEO EST HI 40: CPT | Performed by: PHYSICIAN ASSISTANT

## 2025-05-07 RX ORDER — OMEGA-3 FATTY ACIDS/FISH OIL 300-1000MG
CAPSULE ORAL DAILY
COMMUNITY

## 2025-05-07 RX ORDER — ERGOCALCIFEROL (VITAMIN D2) 10 MCG
TABLET ORAL DAILY
COMMUNITY

## 2025-05-07 RX ORDER — PANTOPRAZOLE SODIUM 40 MG/1
40 TABLET, DELAYED RELEASE ORAL EVERY MORNING
COMMUNITY

## 2025-05-07 ASSESSMENT — LIFESTYLE VARIABLES: TOBACCO_USE: 1

## 2025-05-07 ASSESSMENT — ENCOUNTER SYMPTOMS: SEIZURES: 0

## 2025-05-07 NOTE — PROGRESS NOTES
Flo is a 85 year old who is being evaluated via a billable video visit.      How would you like to obtain your AVS? Steven Ryder   Flo is a 85 year old, presenting for the following health issues:  Pre-Op Exam        JOY Collado LPN

## 2025-05-07 NOTE — H&P
Pre-Operative H & P     CC:  Preoperative exam to assess for increased cardiopulmonary risk while undergoing surgery and anesthesia.    Date of Encounter: 5/7/2025  Primary Care Physician:  Steve Ryder     Reason for visit:   Encounter Diagnoses   Name Primary?    Pre-op evaluation Yes    Enlarged prostate     Iron deficiency anemia, unspecified iron deficiency anemia type        HPI  Guevara Jones is a 85 year old male who presents for pre-operative H & P in preparation for  Procedure Information       Case: [id number] Date/Time: 05/14/25 1000    Procedure: Holmium Laser Enucleation of the Prostate (Urethra)    Anesthesia type: Choice    Diagnosis: Enlarged prostate [N40.0]    Pre-op diagnosis: Enlarged prostate [N40.0]    Location: [facility]    Providers: Long Mena MD            Patient is being evaluated for comorbid conditions of hypertension, hyperlipidemia, HFpEF, CAD, NSTEMI status post JOSUE x 2 (11/2023), PFO, former tobacco use, granulomatosis with polyangitis, on chronic immunosuppression, pulmonary nodules, dyspnea on exertion, on supplemental O2, history of CVA, history of concussion, ataxia, OA, spinal stenosis status post laminectomies, GERD, and CKD.     He has a history of urinary retention secondary to prostatomegaly. He has required a rubin catheter for management but this is poorly tolerated. He has been evaluated by urology and the above surgery was recommended for further management.     History is obtained from the patient and chart review. He is accompanied by a family friend and his daughter today.     Hx of abnormal bleeding or anti-platelet use: Denies      Past Medical History  Past Medical History:   Diagnosis Date    (HFpEF) heart failure with preserved ejection fraction (H)     Anemia     Ataxia     BPH (benign prostatic hyperplasia)     CAD (coronary artery disease)     CKD (chronic kidney disease) stage 3, GFR 30-59 ml/min (H)     DEXTER (dyspnea on exertion)      Edema     Gastroesophageal reflux disease with esophagitis     Granulomatosis with polyangiitis (H)     Hepatic steatosis     History of concussion     History of CVA (cerebrovascular accident)     HLD (hyperlipidemia)     HTN (hypertension)     Insomnia     Obesity     Osteoarthritis     PFO (patent foramen ovale)     Pulmonary nodules     Spinal stenosis of lumbar region with neurogenic claudication     Urinary retention        Past Surgical History  Past Surgical History:   Procedure Laterality Date    APPENDECTOMY      LAMINECTOMY LUMBAR POSTERIOR MICROSCOPIC TWO LEVELS N/A 11/1/2021    Procedure: Open laminectomies lumbar 2-4, Repair of Dural Tear.  ;  Surgeon: Shaka Manriquez MD;  Location: [facility]    TOTAL SHOULDER ARTHROPLASTY Right        Prior to Admission Medications  Current Outpatient Medications   Medication Sig Dispense Refill    acetaminophen (TYLENOL) 325 MG tablet Take 2 tablets (650 mg) by mouth every 4 hours as needed for pain (Patient taking differently: Take 650 mg by mouth 3 times daily.) 60 tablet 0    amLODIPine (NORVASC) 5 MG tablet Take 5 mg by mouth 2 times daily.      Ascorbic Acid (VITAMIN C) POWD Take 1 tablet by mouth 3 times daily.      Aspirin 81 MG CAPS Take 81 mg by mouth every morning.      Cranberry 125 MG TABS Take by mouth every morning.      Desiccated Beef Liver POWD every morning.      Digestive Enzymes CAPS Take 1 capsule by mouth 2 times daily.      ezetimibe (ZETIA) 10 MG tablet Take 10 mg by mouth every morning.      LIPITOR 40 MG tablet Take 40 mg by mouth every morning.      nitroGLYcerin (NITROSTAT) 0.4 MG sublingual tablet Place 0.4 mg under the tongue every 5 minutes as needed for chest pain.      omega 3 1000 MG CAPS Take by mouth daily.      pantoprazole (PROTONIX) 40 MG EC tablet Take 40 mg by mouth every morning.      PREBIOTIC PRODUCT PO Take by mouth daily.      pregabalin (LYRICA) 75 MG capsule Take 150 mg by mouth 2 times daily.      senna-docusate  "(SENOKOT-S/PERICOLACE) 8.6-50 MG tablet Take 1 tablet by mouth daily (Patient taking differently: Take 1 tablet by mouth 2 times daily.) 30 tablet 0    tamsulosin (FLOMAX) 0.4 MG capsule Take 0.8 mg by mouth every morning.      torsemide (DEMADEX) 20 MG tablet Take 20 mg by mouth every morning.      Turmeric (CURCUMIN 95) 500 MG CAPS Take 750 mg by mouth 2 times daily.      UNABLE TO FIND daily. MEDICATION NAME: Lion's nikita      UNABLE TO FIND Take 1 tablet by mouth every morning. MEDICATION NAME: MICHAEL FOOD \"IRON BLOOD BUILDER\"  SUPPLEMENT PLANT BASED      vitamin B complex with vitamin C (VITAMIN  B COMPLEX) tablet Take 1 tablet by mouth daily.      Vitamin D, Cholecalciferol, 10 MCG (400 UNIT) TABS Take by mouth daily.      Alfalfa 250 MG TABS 2 tablets (Patient not taking: Reported on 2025)      cod liver oil CAPS capsule Take 1 capsule by mouth every morning. OMEGA 3 FISH OIL      diphenhydrAMINE-acetaminophen (TYLENOL PM)  MG tablet Take 1 tablet by mouth at bedtime.      gentamicin (GARAMYCIN) 0.3 % ophthalmic solution Place 1 drop into both eyes every 4 hours. (Patient not taking: Reported on 2025)      neomycin-polymyxin-dexAMETHasone (MAXITROL) 3.5-50015-4.1 SUSP ophthalmic susp Place 1 drop into both eyes 4 times daily.      red yeast rice 600 MG CAPS Take 600 mg by mouth daily (Patient not taking: Reported on 2025)         Allergies  Allergies   Allergen Reactions    Latex Hives     Other Reaction(s): itchy red rash/welts    Lentil Other (See Comments)       Social History  Social History     Socioeconomic History    Marital status:      Spouse name: Not on file    Number of children: Not on file    Years of education: Not on file    Highest education level: Not on file   Occupational History    Not on file   Tobacco Use    Smoking status: Former     Current packs/day: 0.00     Types: Cigarettes     Quit date: 1970     Years since quittin.3     Passive exposure: Past    " Smokeless tobacco: Never   Substance and Sexual Activity    Alcohol use: Yes     Comment: 1 drink 4 times a year    Drug use: Not Currently     Types: Psilocybin    Sexual activity: Not on file   Other Topics Concern    Not on file   Social History Narrative    Not on file     Social Drivers of Health     Financial Resource Strain: Not on file   Food Insecurity: No Food Insecurity (3/17/2025)    Received from [facility]    Hunger Vital Sign     Worried About Running Out of Food in the Last Year: Never true     Ran Out of Food in the Last Year: Never true   Transportation Needs: No Transportation Needs (3/17/2025)    Received from [facility]    PRAPARE - Transportation     Lack of Transportation (Medical): No     Lack of Transportation (Non-Medical): No   Physical Activity: Not on file   Stress: Not on file   Social Connections: Feeling Socially Integrated (9/9/2024)    Received from [facility]    OASIS : Social Isolation     Frequency of experiencing loneliness or isolation: Never   Interpersonal Safety: Not At Risk (3/17/2025)    Received from [facility]    EH IP Custom IPV     Do you feel UNSAFE in any of your personal relationships with your family members or any other acquaintances?: No   Housing Stability: Low Risk  (3/17/2025)    Received from [facility]    Housing Stability Vital Sign     Unable to Pay for Housing in the Last Year: No     Number of Times Moved in the Last Year: 0     Homeless in the Last Year: No       Family History  Family History   Problem Relation Age of Onset    Heart Disease Mother         open heart surgery    Cancer Father     Anesthesia Reaction No family hx of     Bleeding Disorder No family hx of     Clotting Disorder No family hx of        Review of Systems  The complete review of systems is  negative other than noted in the HPI or here.   Anesthesia Evaluation   Pt has had prior anesthetic.     No history of anesthetic complications       ROS/MED HX  ENT/Pulmonary: Comment: Pulmonary nodules    Chronic supplemental O2 >> 2L at night    (+)     DIANDRA risk factors,  hypertension,         tobacco use, Past use,                    (-) asthma and recent URI   Neurologic: Comment: H/o concussion    Ataxia    (+)          CVA,                   (-) no seizures   Cardiovascular: Comment: PFO    (+) Dyslipidemia hypertension- -  CAD - past MI - stent-11/2023. 2 Drug Eluting Stent.    CHF     DEXTER.                      Previous cardiac testing   Echo: Date: 3/6/24 Results:  Interpretation Summary   Positive bubble study with Valsalva suggestive of small PFO.   Normal left ventricular chamber size, mild concentric hypertrophy and normal systolic function. LVEF 68%. No regional wall motion abnormalities.   Mild grade I diastolic dysfunction with normal filling pressures.   Normal right ventricular chamber size and systolic function. Normal right atrial pressures.   Valves: mild pulmonic regurgitation   No pericardial effusion.   Normal ascending aorta.   Compared to study 11/7/2023, no significant change.     Stress Test:  Date: Results:    ECG Reviewed:  Date: 7/15/24 Results:  NSR  Cath:  Date: 11/6/23 Results:     Indication for procedure - NSTEMI      Post procedure diagnosis - High grade mid LAD and mid RCA stenoses   treated with successful PCI using 3.5 x 16 mm and 4.0 x 20 mm Synergy JOSUE   respectively     RRA - Standard tricia catheters - EBU 3.5 and JR4 6 Fr guides      Recomend DAPT x 12 months      Coronary Findings Diagnostic Dominance: Right   Left Anterior Descending: Mid LAD lesion is 99% stenosed.   Right Coronary Artery: Prox RCA to Mid RCA lesion is 90% stenosed.      METS/Exercise Tolerance: 1 - Eating, dressing    Hematologic: Comments: Judaism      Musculoskeletal: Comment: Spinal  stenosis s/p L2-4 laminectomies    S/p right total shoulder arthroplasty  (+)  arthritis,             GI/Hepatic:     (+) GERD, Asymptomatic on medication,           liver disease (hepatic steatosis),       Renal/Genitourinary: Comment: Chronic rubin    Admitted March 17 through March 25 for left epididymitis likely due to his catheter    (+) renal disease,             Endo:     (+)               Obesity,    (-) Type II DM and thyroid disease   Psychiatric/Substance Use:  - neg psychiatric ROS     Infectious Disease:     (+)    VRE,     (-) Recent Fever   Malignancy:       Other: Comment: granulomatosis with polyangitis           Virtual visit -  No vitals were obtained    Physical Exam  Constitutional: Pleasant, no apparent distress, and appears stated age.  Eyes: Pupils equal  HENT: Normocephalic and atraumatic  Respiratory: Non labored breathing on room air  Neurologic: Awake, alert, oriented to name, place and time.   Neuropsychiatric: Calm, cooperative. Normal affect.     Prior Labs/Diagnostic Studies   All labs and imaging personally reviewed     PFTs 11/12/24  Results:   1. Spirometry: Normal.  No significant bronchodilator response.   2.  Lung volumes: Normal.   3.  Diffusing capacity: Moderately decreased.   4.  Flow loop: Normal volume.  Normal amplitude.  Normal/nonscooping   morphology.     EKG/ stress test - if available please see in ROS above   No results found.       No data to display                  The patient's records and results personally reviewed by this provider.     Outside records reviewed from: Care Everywhere      Assessment    Guevara Jones is a 85 year old male seen as a PAC referral for risk assessment and optimization for anesthesia.    Plan/Recommendations  Pt will be optimized for the proposed procedure.  See below for details on the assessment, risk, and preoperative recommendations    NEUROLOGY  - History of CVA  Patient has residual ataxia, uses a walker or cane for  ambulation. Has had falls in the past. Consider fall risk precautions.   Patient reports that there was never an acute event that brought him to the hospital for CVA. This was noted on imaging completed for a TBI in 2019.  - History of TBI/concussion (2019)    -Post Op delirium risk factors:  Age and High co-morbid index    ENT  - No current airway concerns.  Will need to be reassessed day of surgery.  Mallampati: Unable to assess  TM: Unable to assess    CARDIAC  - CAD, CHF, hypertension, hyperlipidemia  S/p PCI to JOSUE in November 2023.  Now medically managed with amlodipine, aspirin 81 mg, ezetimibe, Lipitor, and torsemide.  Most recent cardiac testing as above.  - PFO  Noted on echo completed in March 2024  - Follows with outside cardiology.  Per chart review, telephone encounter dated 12/3/2024, cardiology indicated patient was okay to proceed with surgery without additional cardiac evaluation prior.  - Today patient reports that weight has been stable, no edema, denies chest pain/pressure, orthopnea.  He remains compliant with his medications.    - METS (Metabolic Equivalents)  Patient CANNOT perform 4 METS exercise without symptoms             Total Score: 1    Functional Capacity: Unable to complete 4 METS      RCRI-High risk: Class 4  >11% complication reate             Total Score: 3    RCRI: CAD    RCRI: CHF    RCRI: Cerebrovascular Disease         PULMONARY  - Multiple pulmonary nodules thought to be related to GPA. Bronchoscopy with biopsies negative for malignancy 4/2024. Noted to be stable on chest CT 10/16/24.   - Following with pulmonology (Dr. Bello) for significant DEXTER.  Last visit 4/21/2025 where he was felt to be stable from a pulmonology standpoint.  - On chronic supplemental O2, 2 L at night      DIANDRA Medium Risk             Total Score: 3    DIANDRA: Hypertension    DIANDRA: Over 50 ys old    DIANDRA: Male      - Denies asthma or inhaler use  - Tobacco History    History   Smoking Status    Former     "Types: Cigarettes   Smokeless Tobacco    Never       GI  - GERD  Controlled on medications: Proton Pump Inhibitor  - Hepatic steatosis    PONV Medium Risk  Total Score: 2           1 AN PONV: Patient is not a current smoker    1 AN PONV: Intended Post Op Opioids        /RENAL  - BPH,  urinary retention  Above surgery planned for further management  -Recent admission for epididymitis in March  - CKD  Baseline Creatinine  ~1.6-1.7    ENDOCRINE    - BMI: Estimated body mass index is 31.17 kg/m  as calculated from the following:    Height as of this encounter: 1.727 m (5' 8\").    Weight as of this encounter: 93 kg (205 lb).  Obesity (BMI >30)  - No history of Diabetes Mellitus    HEME  VTE Low Risk 0.5%             Total Score: 3    VTE: Greater than 59 yrs old    VTE: Male      - Platelet dysfunction secondary to Aspirin (Abraham, many others)  - Iron deficiency anemia  On oral iron supplement and status post IV iron infusions  Patient reports that his most recent hemoglobin was 14, will update prior to surgery  - Pentecostal - declines blood tranfusions, he is open to cell saver or manufactured blood if needed.  Patient states he has had this discussion with his surgeon.    MSK  -  Follows with Dr. 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  - Last rheumatology visit 2/11/25    Patient is NOT Frail             Total Score: 1    Frailty: Increased exhaustion        Different anesthesia methods/types have been discussed with the patient, but they are aware that the final plan will be decided by the assigned anesthesia provider on the date of service.      The patient is optimized for their procedure. AVS with information on surgery time/arrival time, meds and NPO status given by nursing staff. No further diagnostic testing indicated.    Please refer to the " physical examination documented by the anesthesiologist in the anesthesia record on the day of surgery.    Video-Visit Details    Type of service:  Video Visit    Provider received verbal consent for a Video Visit from the patient? Yes   Video Start Time: 11:19 AM  Video End Time:11:42 AM    Originating Location (pt. Location): Home    Distant Location (provider location):  Off-site  Mode of Communication:  Video Conference via [employer]  On the day of service:     Prep time: 25 minutes  Visit time: 23 minutes  Documentation time: 20 minutes  ------------------------------------------  Total time: 68 minutes      Alissa Solorzano PA-C  Preoperative Assessment Center  [facility]  Clinic and Surgery Center  Phone: [phone]  Fax: [phone]

## 2025-05-07 NOTE — PATIENT INSTRUCTIONS
Preparing for Your Surgery      Name:  Guevara Jones   MRN:  [id number]   :  1940   Today's Date:  2025     The [place] [employer] [address] Construction Project                                Timeline 2025 -2025    This project will affect travel to the [facility] and [facility], as well as the [facility] and Surgery Center.      Please check the [facility] [address] project website for the most up to date information and give yourself additional time to reach your destination.        Arriving for surgery:  Surgery date:  2025  Arrival time:  7:30 am    Please come to:         [facility] [address]   [address] [place]  [address]     The Green Ramp for patients and visitors is beneath the [facility]. The parking facility entrance is at the intersection of [address] [place] [address] and [address]. Patients and visitors who self-park will receive the reduced hospital parking rate (no ticket validation needed).     Untangle parking, located at the [facility] main entrance on [address] [place] [address], is available Monday - Friday from 7 am to 3:30 pm.     Discounted parking pass options can be purchased from  attendants during business hours.     -Check in at the security desk in the [facility] ([facility])   Lobby. They will direct you to the correct elevators.   -Proceed to the 3rd floor, Adult Surgery Waiting Lounge. [phone]     If you need directions, a wheelchair or escort please stop at the Information Desk in the lobby.  Inform the information person you are here for surgery; a wheelchair and escort to [address] will be provided.   An escort to the Adult Surgery Waiting Lounge will be provided. .    What can I eat or drink?  -  You may eat and drink normally up to 8 hours prior to arrival time. (Until Midnight)  -   You may have clear liquids until 2 hours prior to arrival time. (Until 5:30 am)    Examples of clear liquids:  Water  Clear broth  Juices (apple, white grape, white cranberry  and cider) without pulp  Noncarbonated, powder based beverages  (lemonade and Vicente-Aid)  Sodas (Sprite, 7-Up, ginger ale and seltzer)  Coffee or tea (without milk or cream)  Gatorade    -  No Alcohol or cannabis products for at least 24 hours before surgery.     Which medicines can I take?    Hold Aspirin for 7 days before surgery.   Hold Multivitamins for 7 days before surgery.  Hold Supplements for 7 days before surgery.  (Alfalfa, cod liver oil,Cranberry, Iron supplement   Dessicated Beef liver, Omega 3, Red yeast rice,Turmeric)  Hold Ibuprofen (Advil, Motrin) for 1 day(s) before surgery--unless otherwise directed by surgeon.  Hold Naproxen (Aleve) for 4 days before surgery.    -  DO NOT take these medications the day of surgery:  Vitamin C  Vitamin D  Vitamin B  Digestive enzymes  Pre biotic  Tamsulosin  Torsemide  Ezetimibe   Senna      -  PLEASE TAKE these medications the day of surgery:  Tylenol if needed  Amlodipine  Lipitor  Pantoprazole  Pregabalin       How do I prepare myself?  - Please take 2 showers (one the night prior to surgery and one the morning of surgery) using Scrubcare or Hibiclens soap.    Use this soap only from the neck to your toes. Avoid genital area      Leave the soap on your skin for one minute--then rinse thoroughly.      You may use your own shampoo and conditioner. No other hair products.   - Please remove all jewelry and body piercings.  - No lotions, deodorants or fragrance.  - No makeup or fingernail polish.   - Bring your ID and insurance card.    -For patients being admitted to the [facility]  Family members are to take the patient belongings with them and place them in the lockers provided in the Family Lounge.  Please limit the items you bring to 1 bag as the lockers are small.      -If you use  a CPAP machine, please bring the CPAP machine, tubing, and mask to hospital.    -If you have a Deep Brain Stimulator, Spinal Cord Stimulator, or any Neuro Stimulator device---you must bring the remote control to the hospital.      ALL PATIENTS GOING HOME THE SAME DAY OF SURGERY ARE REQUIRED TO HAVE A RESPONSIBLE ADULT TO DRIVE AND BE IN ATTENDANCE WITH THEM FOR 24 HOURS FOLLOWING SURGERY.    Covid testing policy as of 12/06/2022  Your surgeon will notify and schedule you for a COVID test if one is needed before surgery--please direct any questions or COVID symptoms to your surgeon      Questions or Concerns:    - For any questions regarding the day of surgery or your hospital stay, please contact the Pre Admission Nursing Office at [phone].       - If you have health changes between today and your surgery, please call your surgeon.       - For questions after surgery, please call your surgeons office.           Current Visitor Guidelines    2 adult visitors for adult patients in the pre op area    If additional visitors come (beyond a patient care attendant or a group home caregiver), the additional visitors will be asked to wait in the main lobby of the hospital    Visiting hours: 8 a.m. to 8:30 p.m.    Patients confirmed or suspected to have symptoms of COVID 19 or flu:     No visitors allowed for adult patients.   Children (under age 18) can have 1 named visitor.     People who are sick or showing symptoms of COVID 19 or flu:    Are not allowed to visit patients--we can only make exceptions in special situations.       Please follow these guidelines for your visit:          Please maintain social distance          Masking is optional--however at times you may be asked to wear a mask for the safety of yourself and others     Clean your hands with alcohol hand . Do this when you arrive at and leave the building and patient room,    And again after you touch your mask or anything in the room.     Go  directly to and from the room you are visiting.     Stay in the patient s room during your visit. Limit going to other places in the hospital as much as possible     Leave bags and jackets at home or in the car.     For everyone s health, please don t come and go during your visit. That includes for smoking   during your visit.

## 2025-05-12 ENCOUNTER — TELEPHONE (OUTPATIENT)
Dept: UROLOGY | Facility: CLINIC | Age: 85
End: 2025-05-12
Payer: COMMERCIAL

## 2025-05-12 DIAGNOSIS — N39.0 URINARY TRACT INFECTION: Primary | ICD-10-CM

## 2025-05-12 RX ORDER — LINEZOLID 600 MG/1
600 TABLET, FILM COATED ORAL 2 TIMES DAILY
Qty: 10 TABLET | Refills: 0 | Status: ON HOLD | OUTPATIENT
Start: 2025-05-12 | End: 2025-05-17

## 2025-05-12 NOTE — CONFIDENTIAL NOTE
LM on  for a call back about UC results and next steps. Direct line given  ANTONIO Ivey  Care Coordinator Urology  [phone]

## 2025-05-12 NOTE — CONFIDENTIAL NOTE
Called pt and advised on abx per Dr Mena linezolid 600 mg starting today x 5 days. Pt agreed and verbalized understanding. Will start today and call writer if any questions or concerns  ANTONIO Ivey  Care Coordinator Urology  [phone]

## 2025-05-12 NOTE — CONFIDENTIAL NOTE
Called Mindy back with Dr Sheridan office and lm to confirm UC results are what we need and if they can call me once results are in.  Direct line left for RNCC-message to provider if we start abx now or wait.  Surgery on 05/14/2025  ANTONIO Ivey  Care Coordinator Urology  [phone]

## 2025-05-12 NOTE — CONFIDENTIAL NOTE
MILI from [facility] she will fax results to [phone] to clinic location once they have them.  Loni RN  Care Coordinator Urology  [phone]

## 2025-05-14 ENCOUNTER — HOSPITAL ENCOUNTER (INPATIENT)
Facility: CLINIC | Age: 85
DRG: 717 | End: 2025-05-14
Attending: UROLOGY | Admitting: UROLOGY
Payer: MEDICARE

## 2025-05-14 ENCOUNTER — ANESTHESIA (OUTPATIENT)
Dept: SURGERY | Facility: CLINIC | Age: 85
End: 2025-05-14
Payer: MEDICARE

## 2025-05-14 DIAGNOSIS — N40.1 BENIGN PROSTATIC HYPERPLASIA WITH LOWER URINARY TRACT SYMPTOMS, SYMPTOM DETAILS UNSPECIFIED: Primary | ICD-10-CM

## 2025-05-14 PROBLEM — N40.0 BENIGN PROSTATIC HYPERPLASIA: Status: ACTIVE | Noted: 2025-05-14

## 2025-05-14 LAB
CREAT SERPL-MCNC: 1.96 MG/DL (ref 0.67–1.17)
EGFRCR SERPLBLD CKD-EPI 2021: 33 ML/MIN/1.73M2
GLUCOSE BLDC GLUCOMTR-MCNC: 108 MG/DL (ref 70–99)
HGB BLD-MCNC: 12.2 G/DL (ref 13.3–17.7)
MCV RBC AUTO: 87 FL (ref 78–100)

## 2025-05-14 PROCEDURE — 250N000013 HC RX MED GY IP 250 OP 250 PS 637

## 2025-05-14 PROCEDURE — 85018 HEMOGLOBIN: CPT | Performed by: UROLOGY

## 2025-05-14 PROCEDURE — 0VC08ZZ EXTIRPATION OF MATTER FROM PROSTATE, VIA NATURAL OR ARTIFICIAL OPENING ENDOSCOPIC: ICD-10-PCS | Performed by: UROLOGY

## 2025-05-14 PROCEDURE — 250N000011 HC RX IP 250 OP 636: Performed by: UROLOGY

## 2025-05-14 PROCEDURE — 272N000001 HC OR GENERAL SUPPLY STERILE: Performed by: UROLOGY

## 2025-05-14 PROCEDURE — 82565 ASSAY OF CREATININE: CPT | Performed by: UROLOGY

## 2025-05-14 PROCEDURE — 999N000141 HC STATISTIC PRE-PROCEDURE NURSING ASSESSMENT: Performed by: UROLOGY

## 2025-05-14 PROCEDURE — 250N000009 HC RX 250

## 2025-05-14 PROCEDURE — 258N000003 HC RX IP 258 OP 636: Performed by: ANESTHESIOLOGY

## 2025-05-14 PROCEDURE — 250N000025 HC SEVOFLURANE, PER MIN: Performed by: UROLOGY

## 2025-05-14 PROCEDURE — 250N000009 HC RX 250: Performed by: NURSE ANESTHETIST, CERTIFIED REGISTERED

## 2025-05-14 PROCEDURE — 258N000003 HC RX IP 258 OP 636

## 2025-05-14 PROCEDURE — 250N000011 HC RX IP 250 OP 636: Mod: JW | Performed by: ANESTHESIOLOGY

## 2025-05-14 PROCEDURE — 250N000011 HC RX IP 250 OP 636: Mod: JZ | Performed by: NURSE ANESTHETIST, CERTIFIED REGISTERED

## 2025-05-14 PROCEDURE — 258N000001 HC RX 258

## 2025-05-14 PROCEDURE — 52649 PROSTATE LASER ENUCLEATION: CPT | Mod: GC | Performed by: UROLOGY

## 2025-05-14 PROCEDURE — C1758 CATHETER, URETERAL: HCPCS | Performed by: UROLOGY

## 2025-05-14 PROCEDURE — 360N000077 HC SURGERY LEVEL 4, PER MIN: Performed by: UROLOGY

## 2025-05-14 PROCEDURE — 82962 GLUCOSE BLOOD TEST: CPT

## 2025-05-14 PROCEDURE — 88305 TISSUE EXAM BY PATHOLOGIST: CPT | Mod: TC | Performed by: UROLOGY

## 2025-05-14 PROCEDURE — 710N000010 HC RECOVERY PHASE 1, LEVEL 2, PER MIN: Performed by: UROLOGY

## 2025-05-14 PROCEDURE — 120N000002 HC R&B MED SURG/OB UMMC

## 2025-05-14 PROCEDURE — 370N000017 HC ANESTHESIA TECHNICAL FEE, PER MIN: Performed by: UROLOGY

## 2025-05-14 PROCEDURE — 36415 COLL VENOUS BLD VENIPUNCTURE: CPT | Performed by: UROLOGY

## 2025-05-14 PROCEDURE — 88305 TISSUE EXAM BY PATHOLOGIST: CPT | Mod: 26 | Performed by: PATHOLOGY

## 2025-05-14 RX ORDER — FENTANYL CITRATE 50 UG/ML
25 INJECTION, SOLUTION INTRAMUSCULAR; INTRAVENOUS EVERY 5 MIN PRN
Status: DISCONTINUED | OUTPATIENT
Start: 2025-05-14 | End: 2025-05-14 | Stop reason: HOSPADM

## 2025-05-14 RX ORDER — TOLTERODINE 4 MG/1
4 CAPSULE, EXTENDED RELEASE ORAL DAILY PRN
Status: DISCONTINUED | OUTPATIENT
Start: 2025-05-14 | End: 2025-05-16 | Stop reason: HOSPADM

## 2025-05-14 RX ORDER — LIDOCAINE HYDROCHLORIDE 20 MG/ML
INJECTION, SOLUTION INFILTRATION; PERINEURAL PRN
Status: DISCONTINUED | OUTPATIENT
Start: 2025-05-14 | End: 2025-05-14

## 2025-05-14 RX ORDER — POLYETHYLENE GLYCOL 3350 17 G/17G
17 POWDER, FOR SOLUTION ORAL DAILY PRN
Status: DISCONTINUED | OUTPATIENT
Start: 2025-05-15 | End: 2025-05-16 | Stop reason: HOSPADM

## 2025-05-14 RX ORDER — LINEZOLID 2 MG/ML
600 INJECTION, SOLUTION INTRAVENOUS EVERY 12 HOURS
Status: DISCONTINUED | OUTPATIENT
Start: 2025-05-14 | End: 2025-05-14 | Stop reason: HOSPADM

## 2025-05-14 RX ORDER — AMOXICILLIN 250 MG
1 CAPSULE ORAL 2 TIMES DAILY PRN
Status: DISCONTINUED | OUTPATIENT
Start: 2025-05-14 | End: 2025-05-16 | Stop reason: HOSPADM

## 2025-05-14 RX ORDER — PIPERACILLIN SODIUM, TAZOBACTAM SODIUM 2; .25 G/10ML; G/10ML
2.25 INJECTION, POWDER, LYOPHILIZED, FOR SOLUTION INTRAVENOUS EVERY 6 HOURS
Status: DISCONTINUED | OUTPATIENT
Start: 2025-05-14 | End: 2025-05-14 | Stop reason: HOSPADM

## 2025-05-14 RX ORDER — ONDANSETRON 2 MG/ML
4 INJECTION INTRAMUSCULAR; INTRAVENOUS EVERY 30 MIN PRN
Status: DISCONTINUED | OUTPATIENT
Start: 2025-05-14 | End: 2025-05-14 | Stop reason: HOSPADM

## 2025-05-14 RX ORDER — OXYCODONE HYDROCHLORIDE 5 MG/1
5 TABLET ORAL EVERY 4 HOURS PRN
Status: DISCONTINUED | OUTPATIENT
Start: 2025-05-14 | End: 2025-05-16 | Stop reason: HOSPADM

## 2025-05-14 RX ORDER — SODIUM CHLORIDE, SODIUM LACTATE, POTASSIUM CHLORIDE, CALCIUM CHLORIDE 600; 310; 30; 20 MG/100ML; MG/100ML; MG/100ML; MG/100ML
INJECTION, SOLUTION INTRAVENOUS CONTINUOUS
Status: DISCONTINUED | OUTPATIENT
Start: 2025-05-14 | End: 2025-05-14 | Stop reason: HOSPADM

## 2025-05-14 RX ORDER — PANTOPRAZOLE SODIUM 40 MG/1
40 TABLET, DELAYED RELEASE ORAL EVERY MORNING
Status: DISCONTINUED | OUTPATIENT
Start: 2025-05-15 | End: 2025-05-16 | Stop reason: HOSPADM

## 2025-05-14 RX ORDER — LIDOCAINE 40 MG/G
CREAM TOPICAL
Status: DISCONTINUED | OUTPATIENT
Start: 2025-05-14 | End: 2025-05-14 | Stop reason: HOSPADM

## 2025-05-14 RX ORDER — NEOMYCIN SULFATE, POLYMYXIN B SULFATE AND DEXAMETHASONE 3.5; 10000; 1 MG/ML; [USP'U]/ML; MG/ML
1 SUSPENSION/ DROPS OPHTHALMIC 4 TIMES DAILY
Status: DISCONTINUED | OUTPATIENT
Start: 2025-05-14 | End: 2025-05-16 | Stop reason: HOSPADM

## 2025-05-14 RX ORDER — BISACODYL 10 MG
10 SUPPOSITORY, RECTAL RECTAL DAILY PRN
Status: DISCONTINUED | OUTPATIENT
Start: 2025-05-17 | End: 2025-05-16 | Stop reason: HOSPADM

## 2025-05-14 RX ORDER — ONDANSETRON 2 MG/ML
4 INJECTION INTRAMUSCULAR; INTRAVENOUS EVERY 6 HOURS PRN
Status: DISCONTINUED | OUTPATIENT
Start: 2025-05-14 | End: 2025-05-16 | Stop reason: HOSPADM

## 2025-05-14 RX ORDER — ACETAMINOPHEN 325 MG/1
975 TABLET ORAL ONCE
Status: DISCONTINUED | OUTPATIENT
Start: 2025-05-14 | End: 2025-05-14 | Stop reason: HOSPADM

## 2025-05-14 RX ORDER — NALOXONE HYDROCHLORIDE 0.4 MG/ML
0.2 INJECTION, SOLUTION INTRAMUSCULAR; INTRAVENOUS; SUBCUTANEOUS
Status: DISCONTINUED | OUTPATIENT
Start: 2025-05-14 | End: 2025-05-16 | Stop reason: HOSPADM

## 2025-05-14 RX ORDER — ASPIRIN 81 MG/1
81 TABLET ORAL EVERY MORNING
Status: DISCONTINUED | OUTPATIENT
Start: 2025-05-15 | End: 2025-05-16 | Stop reason: HOSPADM

## 2025-05-14 RX ORDER — ACETAMINOPHEN 325 MG/1
975 TABLET ORAL ONCE
Status: COMPLETED | OUTPATIENT
Start: 2025-05-14 | End: 2025-05-14

## 2025-05-14 RX ORDER — ATORVASTATIN CALCIUM 40 MG/1
40 TABLET, FILM COATED ORAL EVERY MORNING
Status: DISCONTINUED | OUTPATIENT
Start: 2025-05-15 | End: 2025-05-16 | Stop reason: HOSPADM

## 2025-05-14 RX ORDER — PROPOFOL 10 MG/ML
INJECTION, EMULSION INTRAVENOUS CONTINUOUS PRN
Status: DISCONTINUED | OUTPATIENT
Start: 2025-05-14 | End: 2025-05-14

## 2025-05-14 RX ORDER — TORSEMIDE 20 MG/1
20 TABLET ORAL EVERY MORNING
Status: DISCONTINUED | OUTPATIENT
Start: 2025-05-15 | End: 2025-05-16 | Stop reason: HOSPADM

## 2025-05-14 RX ORDER — ATROPA BELLADONNA AND OPIUM 16.2; 3 MG/1; MG/1
30 SUPPOSITORY RECTAL EVERY 12 HOURS PRN
Status: DISCONTINUED | OUTPATIENT
Start: 2025-05-14 | End: 2025-05-16 | Stop reason: HOSPADM

## 2025-05-14 RX ORDER — FENTANYL CITRATE 50 UG/ML
INJECTION, SOLUTION INTRAMUSCULAR; INTRAVENOUS PRN
Status: DISCONTINUED | OUTPATIENT
Start: 2025-05-14 | End: 2025-05-14

## 2025-05-14 RX ORDER — PROCHLORPERAZINE MALEATE 5 MG/1
5 TABLET ORAL EVERY 6 HOURS PRN
Status: DISCONTINUED | OUTPATIENT
Start: 2025-05-14 | End: 2025-05-16 | Stop reason: HOSPADM

## 2025-05-14 RX ORDER — ONDANSETRON 4 MG/1
4 TABLET, ORALLY DISINTEGRATING ORAL EVERY 30 MIN PRN
Status: DISCONTINUED | OUTPATIENT
Start: 2025-05-14 | End: 2025-05-14 | Stop reason: HOSPADM

## 2025-05-14 RX ORDER — DEXAMETHASONE SODIUM PHOSPHATE 4 MG/ML
4 INJECTION, SOLUTION INTRA-ARTICULAR; INTRALESIONAL; INTRAMUSCULAR; INTRAVENOUS; SOFT TISSUE
Status: DISCONTINUED | OUTPATIENT
Start: 2025-05-14 | End: 2025-05-14 | Stop reason: HOSPADM

## 2025-05-14 RX ORDER — EZETIMIBE 10 MG/1
10 TABLET ORAL EVERY MORNING
Status: DISCONTINUED | OUTPATIENT
Start: 2025-05-15 | End: 2025-05-16 | Stop reason: HOSPADM

## 2025-05-14 RX ORDER — HYDROMORPHONE HYDROCHLORIDE 1 MG/ML
0.4 INJECTION, SOLUTION INTRAMUSCULAR; INTRAVENOUS; SUBCUTANEOUS EVERY 5 MIN PRN
Status: DISCONTINUED | OUTPATIENT
Start: 2025-05-14 | End: 2025-05-14 | Stop reason: HOSPADM

## 2025-05-14 RX ORDER — ONDANSETRON 4 MG/1
4 TABLET, ORALLY DISINTEGRATING ORAL EVERY 6 HOURS PRN
Status: DISCONTINUED | OUTPATIENT
Start: 2025-05-14 | End: 2025-05-16 | Stop reason: HOSPADM

## 2025-05-14 RX ORDER — IBUPROFEN 400 MG/1
400 TABLET, FILM COATED ORAL EVERY 6 HOURS PRN
Status: DISCONTINUED | OUTPATIENT
Start: 2025-05-14 | End: 2025-05-14

## 2025-05-14 RX ORDER — GENTAMICIN SULFATE 3 MG/ML
1 SOLUTION/ DROPS OPHTHALMIC EVERY 4 HOURS
Status: DISCONTINUED | OUTPATIENT
Start: 2025-05-14 | End: 2025-05-16 | Stop reason: HOSPADM

## 2025-05-14 RX ORDER — NALOXONE HYDROCHLORIDE 0.4 MG/ML
0.4 INJECTION, SOLUTION INTRAMUSCULAR; INTRAVENOUS; SUBCUTANEOUS
Status: DISCONTINUED | OUTPATIENT
Start: 2025-05-14 | End: 2025-05-16 | Stop reason: HOSPADM

## 2025-05-14 RX ORDER — LIDOCAINE 40 MG/G
CREAM TOPICAL
Status: DISCONTINUED | OUTPATIENT
Start: 2025-05-14 | End: 2025-05-16 | Stop reason: HOSPADM

## 2025-05-14 RX ORDER — HYDROMORPHONE HYDROCHLORIDE 1 MG/ML
0.2 INJECTION, SOLUTION INTRAMUSCULAR; INTRAVENOUS; SUBCUTANEOUS EVERY 5 MIN PRN
Status: DISCONTINUED | OUTPATIENT
Start: 2025-05-14 | End: 2025-05-14 | Stop reason: HOSPADM

## 2025-05-14 RX ORDER — ACETAMINOPHEN 650 MG/1
650 SUPPOSITORY RECTAL ONCE
Status: COMPLETED | OUTPATIENT
Start: 2025-05-14 | End: 2025-05-14

## 2025-05-14 RX ORDER — FUROSEMIDE 10 MG/ML
INJECTION INTRAMUSCULAR; INTRAVENOUS PRN
Status: DISCONTINUED | OUTPATIENT
Start: 2025-05-14 | End: 2025-05-14

## 2025-05-14 RX ORDER — DIPHENHYDRAMINE HYDROCHLORIDE 50 MG/ML
12.5 INJECTION, SOLUTION INTRAMUSCULAR; INTRAVENOUS EVERY 6 HOURS PRN
Status: DISCONTINUED | OUTPATIENT
Start: 2025-05-14 | End: 2025-05-16 | Stop reason: HOSPADM

## 2025-05-14 RX ORDER — DIPHENHYDRAMINE HCL 12.5MG/5ML
12.5 LIQUID (ML) ORAL EVERY 6 HOURS PRN
Status: DISCONTINUED | OUTPATIENT
Start: 2025-05-14 | End: 2025-05-16 | Stop reason: HOSPADM

## 2025-05-14 RX ORDER — ACETAMINOPHEN 325 MG/1
975 TABLET ORAL EVERY 8 HOURS
Status: DISCONTINUED | OUTPATIENT
Start: 2025-05-14 | End: 2025-05-16 | Stop reason: HOSPADM

## 2025-05-14 RX ORDER — EPHEDRINE SULFATE 50 MG/ML
INJECTION, SOLUTION INTRAMUSCULAR; INTRAVENOUS; SUBCUTANEOUS PRN
Status: DISCONTINUED | OUTPATIENT
Start: 2025-05-14 | End: 2025-05-14

## 2025-05-14 RX ORDER — FENTANYL CITRATE 50 UG/ML
50 INJECTION, SOLUTION INTRAMUSCULAR; INTRAVENOUS EVERY 5 MIN PRN
Status: DISCONTINUED | OUTPATIENT
Start: 2025-05-14 | End: 2025-05-14 | Stop reason: HOSPADM

## 2025-05-14 RX ORDER — ONDANSETRON 8 MG/1
8 TABLET, ORALLY DISINTEGRATING ORAL 2 TIMES DAILY PRN
COMMUNITY

## 2025-05-14 RX ORDER — LINEZOLID 600 MG/1
600 TABLET, FILM COATED ORAL 2 TIMES DAILY
Status: DISCONTINUED | OUTPATIENT
Start: 2025-05-14 | End: 2025-05-16 | Stop reason: HOSPADM

## 2025-05-14 RX ORDER — NALOXONE HYDROCHLORIDE 0.4 MG/ML
0.1 INJECTION, SOLUTION INTRAMUSCULAR; INTRAVENOUS; SUBCUTANEOUS
Status: DISCONTINUED | OUTPATIENT
Start: 2025-05-14 | End: 2025-05-14 | Stop reason: HOSPADM

## 2025-05-14 RX ORDER — AMLODIPINE BESYLATE 5 MG/1
5 TABLET ORAL 2 TIMES DAILY
Status: DISCONTINUED | OUTPATIENT
Start: 2025-05-15 | End: 2025-05-16 | Stop reason: HOSPADM

## 2025-05-14 RX ORDER — SODIUM CHLORIDE 9 MG/ML
INJECTION, SOLUTION INTRAVENOUS CONTINUOUS
Status: ACTIVE | OUTPATIENT
Start: 2025-05-14 | End: 2025-05-15

## 2025-05-14 RX ORDER — PREGABALIN 75 MG/1
150 CAPSULE ORAL 2 TIMES DAILY
Status: DISCONTINUED | OUTPATIENT
Start: 2025-05-14 | End: 2025-05-16 | Stop reason: HOSPADM

## 2025-05-14 RX ADMIN — LINEZOLID 600 MG: 600 TABLET, FILM COATED ORAL at 19:52

## 2025-05-14 RX ADMIN — FUROSEMIDE 20 MG: 10 INJECTION, SOLUTION INTRAVENOUS at 13:23

## 2025-05-14 RX ADMIN — FENTANYL CITRATE 25 MCG: 50 INJECTION INTRAMUSCULAR; INTRAVENOUS at 11:39

## 2025-05-14 RX ADMIN — SODIUM CHLORIDE, SODIUM LACTATE, POTASSIUM CHLORIDE, AND CALCIUM CHLORIDE: .6; .31; .03; .02 INJECTION, SOLUTION INTRAVENOUS at 10:11

## 2025-05-14 RX ADMIN — EPHEDRINE SULFATE 10 MG: 5 INJECTION INTRAVENOUS at 10:36

## 2025-05-14 RX ADMIN — FENTANYL CITRATE 50 MCG: 50 INJECTION INTRAMUSCULAR; INTRAVENOUS at 13:59

## 2025-05-14 RX ADMIN — PREGABALIN 150 MG: 75 CAPSULE ORAL at 19:52

## 2025-05-14 RX ADMIN — ACETAMINOPHEN 975 MG: 325 TABLET ORAL at 14:53

## 2025-05-14 RX ADMIN — NEOMYCIN SULFATE, POLYMYXIN B SULFATE AND DEXAMETHASONE 1 DROP: 3.5; 10000; 1 SUSPENSION/ DROPS OPHTHALMIC at 19:52

## 2025-05-14 RX ADMIN — EPHEDRINE SULFATE 5 MG: 5 INJECTION INTRAVENOUS at 12:06

## 2025-05-14 RX ADMIN — PROPOFOL 200 MG: 10 INJECTION, EMULSION INTRAVENOUS at 10:22

## 2025-05-14 RX ADMIN — EPHEDRINE SULFATE 10 MG: 5 INJECTION INTRAVENOUS at 12:12

## 2025-05-14 RX ADMIN — SODIUM CHLORIDE FOR IRRIGATION 3000 ML: 0.9 SOLUTION IRRIGATION at 15:44

## 2025-05-14 RX ADMIN — ACETAMINOPHEN 975 MG: 325 TABLET ORAL at 22:08

## 2025-05-14 RX ADMIN — SODIUM CHLORIDE, PRESERVATIVE FREE: 5 INJECTION INTRAVENOUS at 15:45

## 2025-05-14 RX ADMIN — GENTAMICIN SULFATE 1 DROP: 3 SOLUTION OPHTHALMIC at 19:52

## 2025-05-14 RX ADMIN — PROPOFOL 50 MCG/KG/MIN: 10 INJECTION, EMULSION INTRAVENOUS at 10:20

## 2025-05-14 RX ADMIN — LIDOCAINE HYDROCHLORIDE 100 MG: 20 INJECTION, SOLUTION INFILTRATION; PERINEURAL at 10:20

## 2025-05-14 RX ADMIN — SODIUM CHLORIDE, SODIUM LACTATE, POTASSIUM CHLORIDE, AND CALCIUM CHLORIDE: .6; .31; .03; .02 INJECTION, SOLUTION INTRAVENOUS at 13:19

## 2025-05-14 RX ADMIN — HYDROMORPHONE HYDROCHLORIDE 0.4 MG: 1 INJECTION, SOLUTION INTRAMUSCULAR; INTRAVENOUS; SUBCUTANEOUS at 14:07

## 2025-05-14 RX ADMIN — FENTANYL CITRATE 50 MCG: 50 INJECTION INTRAMUSCULAR; INTRAVENOUS at 10:28

## 2025-05-14 RX ADMIN — PIPERACILLIN AND TAZOBACTAM 2.25 G: 2; .25 INJECTION, POWDER, FOR SOLUTION INTRAVENOUS at 10:03

## 2025-05-14 RX ADMIN — FENTANYL CITRATE 25 MCG: 50 INJECTION INTRAMUSCULAR; INTRAVENOUS at 12:28

## 2025-05-14 ASSESSMENT — ENCOUNTER SYMPTOMS: SEIZURES: 0

## 2025-05-14 ASSESSMENT — ACTIVITIES OF DAILY LIVING (ADL)
ADLS_ACUITY_SCORE: 49
ADLS_ACUITY_SCORE: 50
ADLS_ACUITY_SCORE: 50
ADLS_ACUITY_SCORE: 41
ADLS_ACUITY_SCORE: 50
ADLS_ACUITY_SCORE: 49
ADLS_ACUITY_SCORE: 41
ADLS_ACUITY_SCORE: 46
ADLS_ACUITY_SCORE: 41
ADLS_ACUITY_SCORE: 50
ADLS_ACUITY_SCORE: 41
ADLS_ACUITY_SCORE: 50
ADLS_ACUITY_SCORE: 41
ADLS_ACUITY_SCORE: 50
ADLS_ACUITY_SCORE: 41
ADLS_ACUITY_SCORE: 50

## 2025-05-14 ASSESSMENT — LIFESTYLE VARIABLES: TOBACCO_USE: 1

## 2025-05-14 NOTE — ANESTHESIA POSTPROCEDURE EVALUATION
Patient: Guevara Jones    Procedure: Procedure(s):  Holmium Laser Enucleation of the Prostate       Anesthesia Type:  General    Note:  Disposition: Inpatient   Postop Pain Control: Uneventful            Sign Out: Well controlled pain   PONV: No   Neuro/Psych: Uneventful            Sign Out: Acceptable/Baseline neuro status   Airway/Respiratory: Uneventful            Sign Out: Acceptable/Baseline resp. status   CV/Hemodynamics: Uneventful            Sign Out: Acceptable CV status; No obvious hypovolemia; No obvious fluid overload   Other NRE:    DID A NON-ROUTINE EVENT OCCUR?        Last vitals:  Vitals Value Taken Time   /50 05/14/25 14:30   Temp 36.9  C (98.4  F) 05/14/25 13:29   Pulse 64 05/14/25 14:37   Resp 8 05/14/25 14:37   SpO2 91 % 05/14/25 14:37   Vitals shown include unfiled device data.    Electronically Signed By: Madan Laurent MD  May 14, 2025  2:38 PM

## 2025-05-14 NOTE — PHARMACY-ADMISSION MEDICATION HISTORY
"Pharmacist Admission Medication History    Admission medication history is complete. The information provided in this note is only as accurate as the sources available at the time of the update.    Information Source(s): Patient and CareEverywhere/SureScripts via in-person    Pertinent Information:   -Patient has been filling Plavix consistently. Per cardiology note 11/2024 in care everywhere, \"Plavix: ok to stop after current bottle is used up. Would definitely stop before end of December.\" I did NOT add this medication to our list.   -Gentamicin and Maxitrol eye drops last filled 11/2024. Per patient, eye issue has been persistent and he continues to use these.     Changes made to PTA medication list:  Added:   Ondansetron 8mg BID prn    Deleted:   Alfafa supplement  Cod liver (on other fish oil)  Changed: None    Allergies reviewed with patient and updates made in EHR: yes    Medication History Completed By: Armando Page [facility] 5/14/2025 6:28 PM    PTA Med List   Medication Sig Last Dose/Taking    acetaminophen (TYLENOL) 325 MG tablet Take 2 tablets (650 mg) by mouth every 4 hours as needed for pain (Patient taking differently: Take 650 mg by mouth 3 times daily.) 5/14/2025 at  4:30 AM    amLODIPine (NORVASC) 5 MG tablet Take 5 mg by mouth 2 times daily. 5/14/2025 at  4:00 AM    Ascorbic Acid (VITAMIN C) POWD Take 1 tablet by mouth 3 times daily. Past Week    Aspirin 81 MG CAPS Take 81 mg by mouth every morning. Past Week    Cranberry 125 MG TABS Take by mouth every morning. Past Week    Desiccated Beef Liver POWD every morning. Past Week    Digestive Enzymes CAPS Take 1 capsule by mouth 2 times daily. Past Week    diphenhydrAMINE-acetaminophen (TYLENOL PM)  MG tablet Take 1 tablet by mouth at bedtime. Past Week    ezetimibe (ZETIA) 10 MG tablet Take 10 mg by mouth every morning. Past Week    gentamicin (GARAMYCIN) 0.3 % ophthalmic solution Place 1 drop into both eyes every 4 hours. 5/14/2025 at  4:00 AM    " "linezolid (ZYVOX) 600 MG tablet Take 1 tablet (600 mg) by mouth 2 times daily for 5 days. 5/14/2025 at  4:00 AM    LIPITOR 40 MG tablet Take 40 mg by mouth every morning. 5/14/2025 at  4:00 AM    neomycin-polymyxin-dexAMETHasone (MAXITROL) 3.5-15899-5.1 SUSP ophthalmic susp Place 1 drop into both eyes 4 times daily. 5/14/2025 at  4:00 AM    omega 3 1000 MG CAPS Take by mouth daily. Past Week    ondansetron (ZOFRAN ODT) 8 MG ODT tab Take 8 mg by mouth 2 times daily as needed for nausea. Past Month    pantoprazole (PROTONIX) 40 MG EC tablet Take 40 mg by mouth every morning. 5/14/2025 at  4:00 AM    PREBIOTIC PRODUCT PO Take 1 capsule by mouth 2 times daily. Past Week    pregabalin (LYRICA) 75 MG capsule Take 150 mg by mouth 2 times daily. 5/14/2025 at  4:00 AM    senna-docusate (SENOKOT-S/PERICOLACE) 8.6-50 MG tablet Take 1 tablet by mouth daily (Patient taking differently: Take 1 tablet by mouth 2 times daily.) Past Week    torsemide (DEMADEX) 20 MG tablet Take 20 mg by mouth every morning. Past Week    Turmeric (CURCUMIN 95) 500 MG CAPS Take 750 mg by mouth 2 times daily. Past Week    UNABLE TO FIND daily. MEDICATION NAME: Lion's nikita Past Week    UNABLE TO FIND Take 1 tablet by mouth every morning. MEDICATION NAME: MICHAEL FOOD \"IRON BLOOD BUILDER\"  SUPPLEMENT PLANT BASED Past Week    vitamin B complex with vitamin C (VITAMIN  B COMPLEX) tablet Take 1 tablet by mouth daily. Past Week    Vitamin D, Cholecalciferol, 10 MCG (400 UNIT) TABS Take by mouth daily. Past Week      "

## 2025-05-14 NOTE — ANESTHESIA CARE TRANSFER NOTE
Patient: Guevara Jones    Procedure: Procedure(s):  Holmium Laser Enucleation of the Prostate       Diagnosis: Enlarged prostate [N40.0]  Diagnosis Additional Information: No value filed.    Anesthesia Type:   General     Note:    Oropharynx: oropharynx clear of all foreign objects  Level of Consciousness: drowsy  Oxygen Supplementation: face mask  Level of Supplemental Oxygen (L/min / FiO2): 8  Independent Airway: airway patency satisfactory and stable  Dentition: dentition unchanged  Vital Signs Stable: post-procedure vital signs reviewed and stable  Report to RN Given: handoff report given  Patient transferred to: PACU    Handoff Report: Identifed the Patient, Identified the Reponsible Provider, Reviewed the pertinent medical history, Discussed the surgical course, Reviewed Intra-OP anesthesia mangement and issues during anesthesia, Set expectations for post-procedure period and Allowed opportunity for questions and acknowledgement of understanding      Vitals:  Vitals Value Taken Time   /63 05/14/25 13:30   Temp     Pulse 77 05/14/25 13:32   Resp 8 05/14/25 13:32   SpO2 97 % 05/14/25 13:32   Vitals shown include unfiled device data.    Electronically Signed By: JAMES Ford CRNA  May 14, 2025  1:33 PM   Statement Selected

## 2025-05-14 NOTE — BRIEF OP NOTE
[facility]    Brief Operative Note    Pre-operative diagnosis: Enlarged prostate [N40.0]  Post-operative diagnosis Same as pre-operative diagnosis    Procedure: Holmium Laser Enucleation of the Prostate, N/A - Urethra    Surgeon: Surgeons and Role:     * Long Mena MD - Primary     * Tamiko Winter MD - Resident - Assisting  Anesthesia: Choice   Estimated Blood Loss: Less than 50 ml    Drains: 22 Nepali 3-way catheter   Specimens:   ID Type Source Tests Collected by Time Destination   1 :  Tissue Prostate SURGICAL PATHOLOGY EXAM Long Mena MD 5/14/2025 12:38 PM      Findings: None  Complications: None.  Implants: * No implants in log *      Post-Op Plan  - Admit to urology  - continue CBI clamped on POD#1, TOV on POD#2  - continue linezolid x 7 days     Tamiko Winter MD  Urology Resident PGY-2

## 2025-05-14 NOTE — OR NURSING
"PACU to Inpatient Nursing Handoff    Patient Guevara Jones is a 85 year old male who speaks English.   Procedure Procedure(s):  Holmium Laser Enucleation of the Prostate   Surgeon(s) Primary: Long Mena MD  Resident - Assisting: Tamiko Winter MD     Allergies   Allergen Reactions    Latex Hives     Other Reaction(s): itchy red rash/welts    Lentil Other (See Comments)       Isolation  [unfilled]     Past Medical History   has a past medical history of (HFpEF) heart failure with preserved ejection fraction (H), Anemia, Ataxia, BPH (benign prostatic hyperplasia), CAD (coronary artery disease), CKD (chronic kidney disease) stage 3, GFR 30-59 ml/min (H), DEXTER (dyspnea on exertion), Edema, Gastroesophageal reflux disease with esophagitis, Granulomatosis with polyangiitis (H), Hepatic steatosis, History of concussion, History of CVA (cerebrovascular accident), HLD (hyperlipidemia), HTN (hypertension), Insomnia, Obesity, Osteoarthritis, PFO (patent foramen ovale), Pulmonary nodules, Spinal stenosis of lumbar region with neurogenic claudication, and Urinary retention.    Anesthesia Choice   Dermatome Level     Preop Meds acetaminophen (Tylenol) - time given: patient took 650mg at home @ 0430 this morning   Nerve block Not applicable   Intraop Meds fentanyl (Sublimaze): 100 mcg total  Lasix 20mg @ 1323   Local Meds No   Antibiotics piperacillin-tazobactam (Zosyn) - last given at 1003     Pain Patient Currently in Pain: yes   PACU meds  fentanyl (Sublimaze): 50 mcg (total dose) last given at 1359   hydromorphone (Dilaudid): 0.4 mg (total dose) last given at 1407    PCA / epidural No   Capnography     Telemetry ECG Rhythm: Normal sinus rhythm   Inpatient Telemetry Monitor Ordered? No        Labs Glucose Lab Results   Component Value Date     05/14/2025     10/11/2021       Hgb Lab Results   Component Value Date    HGB 12.2 05/14/2025       INR No results found for: \"INR\"   PACU Imaging Not applicable "     Wound/Incision Incision/Surgical Site 05/14/25 Urethral meatus (Active)   Number of days: 0      CMS        Equipment CBI   Other LDA       IV Access Peripheral IV 05/14/25 Left;Posterior Hand (Active)   Site Assessment WDL 05/14/25 1329   Line Status Infusing 05/14/25 1329   Dressing Transparent 05/14/25 1329   Dressing Status clean;dry;intact 05/14/25 1329   Dressing Intervention New dressing  05/14/25 0910   Line Intervention Flushed 05/14/25 0910   Line Necessity Yes, meets criteria 05/14/25 0910   Phlebitis Scale 0-->no symptoms 05/14/25 1329   Infiltration? no 05/14/25 0910   Number of days: 0      Blood Products Not applicable EBL <50 mL   Intake/Output Date 05/14/25 0700 - 05/15/25 0659   Shift 9409-8688 4552-3927 9844-0989 24 Hour Total   INTAKE   I.V. 1000   1000   Shift Total(mL/kg) 1000(10.44)   1000(10.44)   OUTPUT   Urine 300   300   Shift Total(mL/kg) 300(3.13)   300(3.13)   Weight (kg) 95.8 95.8 95.8 95.8      Drains / [place] Urinary Drain 05/14/25 Urethral Catheter Double-lumen;Non-latex;Straight-tip;Triple-lumen 22 fr (Active)   Collection Container Standard 05/14/25 1329   Securement Method Commercial securement device 05/14/25 1329   Number of days: 0      Time of void PreOp Time of Void Prior to Procedure: 0915 (home catheter) (05/14/25 0916)    PostOp      Diapered? No   Bladder Scan     PO    Nothing by mouth at present.     Vitals    B/P: (!) 140/63  T: 98.4  F (36.9  C)    Temp src: Axillary  P:  Pulse: 73 (05/14/25 1400)          R: 10  O2:  SpO2: 97 %    O2 Device: Simple face mask (05/14/25 1329)    Oxygen Delivery: 7 LPM (05/14/25 1329)         Family/support present Babita/daughter in law   Patient belongings     Patient transported on cart   DC meds/scripts (obs/outpt) Not applicable   Inpatient Pain Meds Released? Yes       Special needs/considerations [place], bilateral hearing aids   Tasks needing completion Continue to monitor CBI.       ANTONIO Maldonado

## 2025-05-14 NOTE — PLAN OF CARE
5MS Admission Note    Reason for admission: prostate enucleation  Primary team notified of pt arrival.  Admitted from: PACU  Via: cart  Accompanied by: daughter in law  Belongings: Placed in closet; valuables sent home with family  Admission Required Doc Completed: Yes  Mobility Devices Utilized by Patient Provided (i.e. walker, wheelchair, etc.): Yes  Teaching: Orientation to unit and call light- call light within reach, use of console, meal times, when to call for the RN, and enforced importance of safety.  IV Access: L PIV  Telemetry: No  Ht./Wt.: Completed  Code Status verified on armband: Yes  2 RN Skin Assessment Completed with: Nisha COCHRAN RN  Suction/Ambu bag/Flowmeter at bedside: Yes    Pt status: Acute stable    Goal Outcome Evaluation:      Plan of Care Reviewed With: patient    Overall Patient Progress: improvingOverall Patient Progress: improving    End of shift Summary: See flowsheet for VS and detail assessments.     Changes this Shift: Arrived from PACU 1515. A&Ox4, VSS, sats maintained on 2LNC.  Denies chest pain, SOB, cough.  CBI rubin catheter. Per pt care order, can disconnect and cap CBI when current bags run out. Passed along to overnight RN.  Surigcal incision on meatus, URIEL. Skin otherwise intact.  Pain 3-4/10. Patient says is tolerable and would like to try to stick to only tylenol if able.  CMS intact.  L PIV .  Reg diet. Call light within reach, able to make needs known.     Plan: Cont POC. Possible discharge Friday.

## 2025-05-14 NOTE — ANESTHESIA PROCEDURE NOTES
Airway       Patient location during procedure: OR  Staff -        CRNA: Sylwia Morris APRN CRNA       Performed By: CRNA  Consent for Airway        Urgency: elective  Indications and Patient Condition       Indications for airway management: brent-procedural       Induction type:intravenous       Mask difficulty assessment: 0 - not attempted    Final Airway Details       Final airway type: supraglottic airway    Supraglottic Airway Details        Type: LMA       Brand: Air-Q       LMA size: 4    Post intubation assessment        Placement verified by: capnometry, equal breath sounds and chest rise        Number of attempts at approach: 1       Secured with: silk tape       Ease of procedure: easy       Dentition: Intact and Unchanged

## 2025-05-14 NOTE — OP NOTE
OPERATIVE REPORT    PREOPERATIVE DIAGNOSIS: Benign Prostatic Hyperplasia     POSTOPERATIVE DIAGNOSIS: Same    PROCEDURES PERFORMED:   Holmium Laser Enucleation of the Prostate (HoLEP)    STAFF SURGEON: Long Mena MD  ASSISTANTS: Tamiko Winter MD    Modified 22: 45 additional minutes of operative time due to prostate size    ANESTHESIA: General  ESTIMATED BLOOD LOSS: 50 ml  COMPLICATIONS: None.   SPECIMEN:   ID Type Source Tests Collected by Time Destination   1 :  Tissue Prostate SURGICAL PATHOLOGY EXAM Long Mena MD 5/14/2025 12:38 PM      SIGNIFICANT FINDINGS:   Enucleation time: 96 min  Morcellation time: 21 min  Tissue weight: 137 g  Total energy: 289.4 kJ    BRIEF OPERATIVE INDICATIONS: Guevara Jones is a 85 year old year old male who presented with urinary retention and enlarged prostate of approximately 200 grams.  After a discussion of all risks, benefits, and alternatives, the patient elected to proceed with HoLEP.    DESCRIPTION OF PROCEDURE:  After informed consent was obtained, the patient was transported to the operating room & placed supine on the table. After adequate anesthesia was induced, the patient was placed in lithotomy and prepped and draped in the usual sterile fashion. A timeout was taken to confirm correct patient, procedure and laterality. Pre-operative IV antibiotics were administered.     The urethra was injected with lubricant jelly and was calibrated with sounds from 22 fr to 30 fr sequentially in 2 fr increments.  These passed easily, and then 28 fr sheath was placed with the obturator.  The bladder was unremarkable.  The ureteral orifices were orthotopic.  The prostate had Bilobar anatomy with outlet obstruction.    The dissection was started at the apex by demarcating a line circumferentially just proximal to the urinary sphincter.    Incisions were made just proximal to the verumontanum in an inverted U shape to demarcate the distal extent of the incision. We started  at the bladder neck and made incision at the 6 clock position and took the incision to the verumontanum. This incision was deepened until we reached the capsule.     Incisions were then made just lateral to the verumontanum to find the transition zone enucleation plane. The right plane was dissected a combination of laser and blunt dissection.  As the plane was developed, the previously incised mucosa was continually checked to ensure detachment. This plane was taken up to the 12 o' clock position. The anterior enucleation was carried to the bladder neck.  After ensuring careful hemostasis as the bladder is entered through the bladder neck, the 10-2 o'clock bladder neck attachments were dissected.  The right side was then further dissected, carrying the dissection from the bladder neck down to the posterior side, and then working the tissue forward. We next identified the mucosal strip anteriorly and transected it with the laser.  We then made incision working from apex to the bladder neck in the midline  the right and the left lobes. The posterior and lateral plan was joined and the adenoma was enucleated to the bladder neck.  The bladder neck mucosa was then cut and then the right lateral lobe adenoma was liberated into the bladder.    Incisions were then made just lateral on the left of the verumontanum to find the transition zone enucleation plane. As the plane was developed, the previously incised mucosa was continually checked to ensure detachment. This plane was taken up to the 12 o' clock position. The left side was then further dissected, carrying the dissection from the bladder neck down to the posterior side, and then working the tissue forward. We next identified the mucosal strip anteriorly and transected it with the laser and ensured the adenoma was completely free from the apex. We then proceeded to take down the remaining lateral and posterior attachments which allowed us to push the  adenoma into the bladder. The bladder neck mucosa was then cut and then the left lateral lobe adenoma was liberated into the bladder.      The prostatic fossa was examined and meticulous hemostasis was achieved with the laser, with special attention to the bladder neck and apex.    The laserscope was removed and the extra long offset nephroscope was inserted and the [place] morcellator was introduced.  After attaching two inflows and confirming they were open and bladder was distended, morcellation was carried out at a rate of 1500  oscillations/min.  After the adenoma was morcellated, the fossa was inspected again and any areas of bleeding were lasered for hemostasis.     A 22 fr 3 way rubin catheter was inserted with 60 ml in the balloon.   Continuous bladder irrigation was started and patient was undraped.  They were awakened from anesthesia and transferred to the PACU.       POSTOP PLAN:  Patient will be admitted postoperatively and catheter will be removed on POD2  Labs CBC/BMP in AM  Continue travis Winter MD  Urology Resident PGY-2      I, Long Mena, was present for the entire case on 05/14/25.

## 2025-05-14 NOTE — ANESTHESIA PREPROCEDURE EVALUATION
Pre-Operative H & P     CC:  Preoperative exam to assess for increased cardiopulmonary risk while undergoing surgery and anesthesia.    Date of Encounter: 5/7/2025  Primary Care Physician:  Steve Ryder     Reason for visit:   No diagnosis found.      HPI  Guevara Jones is a 85 year old male who presents for pre-operative H & P in preparation for  Procedure Information       Case: [id number] Date/Time: 05/14/25 1000    Procedure: Holmium Laser Enucleation of the Prostate (Urethra)    Anesthesia type: Choice    Diagnosis: Enlarged prostate [N40.0]    Pre-op diagnosis: Enlarged prostate [N40.0]    [facility]: [facility]    Providers: Long Mena MD            Patient is being evaluated for comorbid conditions of hypertension, hyperlipidemia, HFpEF, CAD, NSTEMI status post JOSUE x 2 (11/2023), PFO, former tobacco use, granulomatosis with polyangitis, on chronic immunosuppression, pulmonary nodules, dyspnea on exertion, on supplemental O2, history of CVA, history of concussion, ataxia, OA, spinal stenosis status post laminectomies, GERD, and CKD.     He has a history of urinary retention secondary to prostatomegaly. He has required a rubin catheter for management but this is poorly tolerated. He has been evaluated by urology and the above surgery was recommended for further management.     History is obtained from the patient and chart review. He is accompanied by a family friend and his daughter today.     Hx of abnormal bleeding or anti-platelet use: Denies      Past Medical History  Past Medical History:   Diagnosis Date    (HFpEF) heart failure with preserved ejection fraction (H)     Anemia     Ataxia     BPH (benign prostatic hyperplasia)     CAD (coronary artery disease)     CKD (chronic kidney disease) stage 3, GFR 30-59 ml/min (H)     DEXTER (dyspnea on exertion)     Edema     Gastroesophageal reflux disease with esophagitis     Granulomatosis with polyangiitis (H)     Hepatic steatosis      History of concussion     History of CVA (cerebrovascular accident)     HLD (hyperlipidemia)     HTN (hypertension)     Insomnia     Obesity     Osteoarthritis     PFO (patent foramen ovale)     Pulmonary nodules     Spinal stenosis of lumbar region with neurogenic claudication     Urinary retention        Past Surgical History  Past Surgical History:   Procedure Laterality Date    APPENDECTOMY      LAMINECTOMY LUMBAR POSTERIOR MICROSCOPIC TWO LEVELS N/A 2021    Procedure: Open laminectomies lumbar 2-4, Repair of Dural Tear.  ;  Surgeon: Shaka Manriquez MD;  Location[facility]    TOTAL SHOULDER ARTHROPLASTY Right        Prior to Admission Medications  No current outpatient medications on file.       Allergies  Allergies   Allergen Reactions    Latex Hives     Other Reaction(s): itchy red rash/welts    Lentil Other (See Comments)       Social History  Social History     Socioeconomic History    Marital status:      Spouse name: Not on file    Number of children: Not on file    Years of education: Not on file    Highest education level: Not on file   Occupational History    Not on file   Tobacco Use    Smoking status: Former     Current packs/day: 0.00     Types: Cigarettes     Quit date: 1970     Years since quittin.4     Passive exposure: Past    Smokeless tobacco: Never   Substance and Sexual Activity    Alcohol use: Yes     Comment: 1 drink 4 times a year    Drug use: Not Currently     Types: Psilocybin    Sexual activity: Not on file   Other Topics Concern    Not on file   Social History Narrative    Not on file     Social Drivers of Health     Financial Resource Strain: Not on file   Food Insecurity: No Food Insecurity (3/17/2025)    Received from [facility]    Hunger Vital Sign     Worried About Running Out of Food in the Last Year: Never true     Ran Out of Food in the Last Year: Never true   Transportation Needs: No Transportation Needs (3/17/2025)     Received from [facility]    PRAPARE - Transportation     Lack of Transportation (Medical): No     Lack of Transportation (Non-Medical): No   Physical Activity: Not on file   Stress: Not on file   Social Connections: Feeling Socially Integrated (9/9/2024)    Received from [facility]    OASIS : Social Isolation     Frequency of experiencing loneliness or isolation: Never   Interpersonal Safety: Low Risk  (5/14/2025)    Interpersonal Safety     Do you feel physically and emotionally safe where you currently live?: Yes     Within the past 12 months, have you been hit, slapped, kicked or otherwise physically hurt by someone?: No     Within the past 12 months, have you been humiliated or emotionally abused in other ways by your partner or ex-partner?: No   Housing Stability: Low Risk  (3/17/2025)    Received from [facility]    Housing Stability Vital Sign     Unable to Pay for Housing in the Last Year: No     Number of Times Moved in the Last Year: 0     Homeless in the Last Year: No       Family History  Family History   Problem Relation Age of Onset    Heart Disease Mother         open heart surgery    Cancer Father     Anesthesia Reaction No family hx of     Bleeding Disorder No family hx of     Clotting Disorder No family hx of        Review of Systems  The complete review of systems is negative other than noted in the HPI or here.   Anesthesia Evaluation   Pt has had prior anesthetic.     No history of anesthetic complications       ROS/MED HX  ENT/Pulmonary: Comment: Pulmonary nodules    Chronic supplemental O2 >> 2L at night    (+)     DIANDRA risk factors,  hypertension,         tobacco use, Past use,                    (-) asthma and recent URI   Neurologic: Comment: H/o concussion    Ataxia    (+)          CVA,                   (-) no seizures   Cardiovascular: Comment: PFO    (+) Dyslipidemia  hypertension- -  CAD - past MI - stent-11/2023. 2 Drug Eluting Stent.    CHF     DEXTER.                      Previous cardiac testing   Echo: Date: 3/6/24 Results:  Interpretation Summary   Positive bubble study with Valsalva suggestive of small PFO.   Normal left ventricular chamber size, mild concentric hypertrophy and normal systolic function. LVEF 68%. No regional wall motion abnormalities.   Mild grade I diastolic dysfunction with normal filling pressures.   Normal right ventricular chamber size and systolic function. Normal right atrial pressures.   Valves: mild pulmonic regurgitation   No pericardial effusion.   Normal ascending aorta.   Compared to study 11/7/2023, no significant change.     Stress Test:  Date: Results:    ECG Reviewed:  Date: 7/15/24 Results:  NSR  Cath:  Date: 11/6/23 Results:     Indication for procedure - NSTEMI      Post procedure diagnosis - High grade mid LAD and mid RCA stenoses   treated with successful PCI using 3.5 x 16 mm and 4.0 x 20 mm Synergy JOSUE   respectively     RRA - Standard tricia catheters - EBU 3.5 and JR4 6 Fr guides      Recomend DAPT x 12 months      Coronary Findings Diagnostic Dominance: Right   Left Anterior Descending: Mid LAD lesion is 99% stenosed.   Right Coronary Artery: Prox RCA to Mid RCA lesion is 90% stenosed.      METS/Exercise Tolerance: 1 - Eating, dressing    Hematologic: Comments: Oriental orthodox      Musculoskeletal: Comment: Spinal stenosis s/p L2-4 laminectomies    S/p right total shoulder arthroplasty  (+)  arthritis,             GI/Hepatic:     (+) GERD, Asymptomatic on medication,           liver disease (hepatic steatosis),       Renal/Genitourinary: Comment: Chronic rubin    Admitted March 17 through March 25 for left epididymitis likely due to his catheter    (+) renal disease,             Endo:     (+)               Obesity,    (-) Type II DM and thyroid disease   Psychiatric/Substance Use:  - neg psychiatric ROS     Infectious Disease:      (+)    VRE,     (-) Recent Fever   Malignancy:       Other: Comment: granulomatosis with polyangitis           Virtual visit -  No vitals were obtained    Physical Exam  Constitutional: Pleasant, no apparent distress, and appears stated age.  Eyes: Pupils equal  HENT: Normocephalic and atraumatic  Respiratory: Non labored breathing on room air  Neurologic: Awake, alert, oriented to name, place and time.   Neuropsychiatric: Calm, cooperative. Normal affect.     Prior Labs/Diagnostic Studies   All labs and imaging personally reviewed     PFTs 11/12/24  Results:   1. Spirometry: Normal.  No significant bronchodilator response.   2.  Lung volumes: Normal.   3.  Diffusing capacity: Moderately decreased.   4.  Flow loop: Normal volume.  Normal amplitude.  Normal/nonscooping   morphology.     EKG/ stress test - if available please see in ROS above   No results found.       No data to display                  The patient's records and results personally reviewed by this provider.     Outside records reviewed from: Care Everywhere      Assessment    Guevara Jones is a 85 year old male seen as a PAC referral for risk assessment and optimization for anesthesia.    Plan/Recommendations  Pt will be optimized for the proposed procedure.  See below for details on the assessment, risk, and preoperative recommendations    NEUROLOGY  - History of CVA  Patient has residual ataxia, uses a walker or cane for ambulation. Has had falls in the past. Consider fall risk precautions.   Patient reports that there was never an acute event that brought him to the hospital for CVA. This was noted on imaging completed for a TBI in 2019.  - History of TBI/concussion (2019)    -Post Op delirium risk factors:  Age and High co-morbid index    ENT  - No current airway concerns.  Will need to be reassessed day of surgery.  Mallampati: Unable to assess  TM: Unable to assess    CARDIAC  - CAD, CHF, hypertension, hyperlipidemia  S/p PCI to JOSUE in  November 2023.  Now medically managed with amlodipine, aspirin 81 mg, ezetimibe, Lipitor, and torsemide.  Most recent cardiac testing as above.  - PFO  Noted on echo completed in March 2024  - Follows with outside cardiology.  Per chart review, telephone encounter dated 12/3/2024, cardiology indicated patient was okay to proceed with surgery without additional cardiac evaluation prior.  - Today patient reports that weight has been stable, no edema, denies chest pain/pressure, orthopnea.  He remains compliant with his medications.    - METS (Metabolic Equivalents)  Patient CANNOT perform 4 METS exercise without symptoms             Total Score: 1    Functional Capacity: Unable to complete 4 METS      RCRI-High risk: Class 4  >11% complication reate             Total Score: 3    RCRI: CAD    RCRI: CHF    RCRI: Cerebrovascular Disease         PULMONARY  - Multiple pulmonary nodules thought to be related to GPA. Bronchoscopy with biopsies negative for malignancy 4/2024. Noted to be stable on chest CT 10/16/24.   - Following with pulmonology (Dr. Bello) for significant DEXTER.  Last visit 4/21/2025 where he was felt to be stable from a pulmonology standpoint.  - On chronic supplemental O2, 2 L at night      DIANDRA Medium Risk             Total Score: 3    DIANDRA: Hypertension    DIANDRA: Over 50 ys old    DIANDRA: Male      - Denies asthma or inhaler use  - Tobacco History    History   Smoking Status    Former    Types: Cigarettes   Smokeless Tobacco    Never       GI  - GERD  Controlled on medications: Proton Pump Inhibitor  - Hepatic steatosis    PONV Low Risk  Total Score: 1           1 AN PONV: Patient is not a current smoker        /RENAL  - BPH,  urinary retention  Above surgery planned for further management  -Recent admission for epididymitis in March  - CKD  Baseline Creatinine  ~1.6-1.7    ENDOCRINE    - BMI: Estimated body mass index is 32.11 kg/m  as calculated from the following:    Height as of this encounter: 1.727 m  "(5' 8\").    Weight as of this encounter: 95.8 kg (211 lb 3.2 oz).  Obesity (BMI >30)  - No history of Diabetes Mellitus    HEME  VTE Low Risk 0.5%             Total Score: 3    VTE: Greater than 59 yrs old    VTE: Male      - Platelet dysfunction secondary to Aspirin (Abraham, many others)  - Iron deficiency anemia  On oral iron supplement and status post IV iron infusions  Patient reports that his most recent hemoglobin was 14, will update prior to surgery  - Mandaen - declines blood tranfusions, he is open to cell saver or manufactured blood if needed.  Patient states he has had this discussion with his surgeon.    MSK  -  Follows with Dr. 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  - Last rheumatology visit 2/11/25    Patient is NOT Frail             Total Score: 1    Frailty: Increased exhaustion        Different anesthesia methods/types have been discussed with the patient, but they are aware that the final plan will be decided by the assigned anesthesia provider on the date of service.      The patient is optimized for their procedure. AVS with information on surgery time/arrival time, meds and NPO status given by nursing staff. No further diagnostic testing indicated.    Please refer to the physical examination documented by the anesthesiologist in the anesthesia record on the day of surgery.    Video-Visit Details    Type of service:  Video Visit    Provider received verbal consent for a Video Visit from the patient? Yes   Video Start Time: 11:19 AM  Video End Time:11:42 AM    Originating Location (pt. Location): Home    Distant Location (provider location):  Off-site  Mode of Communication:  Video Conference via [employer] [place]  On the day of service:     Prep time: 25 minutes  Visit time: 23 minutes  Documentation time: 20 " minutes  ------------------------------------------  Total time: 68 minutes      Alissa Solorzano PA-C  Preoperative Assessment Center  [facility]  [facility]  Phone: [phone]  Fax: [phone]    [Addendum] patient completed labs through local clinic on 5/9/25, see encounter from that date for details.  Madan Laurent MD on 5/13/2025 at 8:19 AM      Physical Exam  Airway  Mallampati: II  Neck ROM: full  Mouth opening: >= 4 cm    Cardiovascular - normal exam Comments: PFO  Dental     Pulmonary - normal exam      Neurological   Other Findings     Anesthesia Plan    ASA Status:  3       Anesthesia Type: General.   Induction: intravenous.   Techniques and Equipment:     - Airway:   Planned airway equipment includes supraglottic airway.     Consents    Anesthesia Plan(s) and associated risks, benefits, and realistic alternatives discussed. Questions answered and patient/representative(s) expressed understanding.     - Discussed:     - Discussed with:  Patient            Postoperative Care            Comments:

## 2025-05-15 VITALS
WEIGHT: 211.2 LBS | BODY MASS INDEX: 32.01 KG/M2 | TEMPERATURE: 98.1 F | OXYGEN SATURATION: 94 % | HEART RATE: 74 BPM | HEIGHT: 68 IN | SYSTOLIC BLOOD PRESSURE: 145 MMHG | RESPIRATION RATE: 18 BRPM | DIASTOLIC BLOOD PRESSURE: 73 MMHG

## 2025-05-15 LAB
ANION GAP SERPL CALCULATED.3IONS-SCNC: 11 MMOL/L (ref 7–15)
BUN SERPL-MCNC: 19.6 MG/DL (ref 8–23)
CALCIUM SERPL-MCNC: 8.5 MG/DL (ref 8.8–10.4)
CHLORIDE SERPL-SCNC: 109 MMOL/L (ref 98–107)
CREAT SERPL-MCNC: 1.51 MG/DL (ref 0.67–1.17)
EGFRCR SERPLBLD CKD-EPI 2021: 45 ML/MIN/1.73M2
ERYTHROCYTE [DISTWIDTH] IN BLOOD BY AUTOMATED COUNT: 15.9 % (ref 10–15)
GLUCOSE BLDC GLUCOMTR-MCNC: 111 MG/DL (ref 70–99)
GLUCOSE SERPL-MCNC: 94 MG/DL (ref 70–99)
HCO3 SERPL-SCNC: 22 MMOL/L (ref 22–29)
HCT VFR BLD AUTO: 35.2 % (ref 40–53)
HGB BLD-MCNC: 11.4 G/DL (ref 13.3–17.7)
MCH RBC QN AUTO: 28.4 PG (ref 26.5–33)
MCHC RBC AUTO-ENTMCNC: 32.4 G/DL (ref 31.5–36.5)
MCV RBC AUTO: 88 FL (ref 78–100)
PLATELET # BLD AUTO: 201 10E3/UL (ref 150–450)
POTASSIUM SERPL-SCNC: 3.8 MMOL/L (ref 3.4–5.3)
RBC # BLD AUTO: 4.01 10E6/UL (ref 4.4–5.9)
SODIUM SERPL-SCNC: 142 MMOL/L (ref 135–145)
WBC # BLD AUTO: 9 10E3/UL (ref 4–11)

## 2025-05-15 PROCEDURE — 250N000013 HC RX MED GY IP 250 OP 250 PS 637

## 2025-05-15 PROCEDURE — 85027 COMPLETE CBC AUTOMATED: CPT

## 2025-05-15 PROCEDURE — 120N000002 HC R&B MED SURG/OB UMMC

## 2025-05-15 PROCEDURE — 80048 BASIC METABOLIC PNL TOTAL CA: CPT

## 2025-05-15 PROCEDURE — 36415 COLL VENOUS BLD VENIPUNCTURE: CPT

## 2025-05-15 RX ADMIN — ACETAMINOPHEN 975 MG: 325 TABLET ORAL at 22:05

## 2025-05-15 RX ADMIN — NEOMYCIN SULFATE, POLYMYXIN B SULFATE AND DEXAMETHASONE 1 DROP: 3.5; 10000; 1 SUSPENSION/ DROPS OPHTHALMIC at 08:28

## 2025-05-15 RX ADMIN — AMLODIPINE BESYLATE 5 MG: 5 TABLET ORAL at 08:28

## 2025-05-15 RX ADMIN — GENTAMICIN SULFATE 1 DROP: 3 SOLUTION OPHTHALMIC at 18:32

## 2025-05-15 RX ADMIN — GENTAMICIN SULFATE 1 DROP: 3 SOLUTION OPHTHALMIC at 22:06

## 2025-05-15 RX ADMIN — ATORVASTATIN CALCIUM 40 MG: 40 TABLET, FILM COATED ORAL at 08:27

## 2025-05-15 RX ADMIN — PREGABALIN 150 MG: 75 CAPSULE ORAL at 20:30

## 2025-05-15 RX ADMIN — NEOMYCIN SULFATE, POLYMYXIN B SULFATE AND DEXAMETHASONE 1 DROP: 3.5; 10000; 1 SUSPENSION/ DROPS OPHTHALMIC at 17:09

## 2025-05-15 RX ADMIN — GENTAMICIN SULFATE 1 DROP: 3 SOLUTION OPHTHALMIC at 13:58

## 2025-05-15 RX ADMIN — PREGABALIN 150 MG: 75 CAPSULE ORAL at 08:27

## 2025-05-15 RX ADMIN — AMLODIPINE BESYLATE 5 MG: 5 TABLET ORAL at 20:30

## 2025-05-15 RX ADMIN — NEOMYCIN SULFATE, POLYMYXIN B SULFATE AND DEXAMETHASONE 1 DROP: 3.5; 10000; 1 SUSPENSION/ DROPS OPHTHALMIC at 13:58

## 2025-05-15 RX ADMIN — ASPIRIN 81 MG: 81 TABLET ORAL at 08:27

## 2025-05-15 RX ADMIN — GENTAMICIN SULFATE 1 DROP: 3 SOLUTION OPHTHALMIC at 10:26

## 2025-05-15 RX ADMIN — GENTAMICIN SULFATE 1 DROP: 3 SOLUTION OPHTHALMIC at 06:05

## 2025-05-15 RX ADMIN — ACETAMINOPHEN 975 MG: 325 TABLET ORAL at 06:05

## 2025-05-15 RX ADMIN — LINEZOLID 600 MG: 600 TABLET, FILM COATED ORAL at 20:30

## 2025-05-15 RX ADMIN — ACETAMINOPHEN 975 MG: 325 TABLET ORAL at 13:57

## 2025-05-15 RX ADMIN — PANTOPRAZOLE SODIUM 40 MG: 40 TABLET, DELAYED RELEASE ORAL at 08:27

## 2025-05-15 RX ADMIN — TORSEMIDE 20 MG: 20 TABLET ORAL at 08:27

## 2025-05-15 RX ADMIN — LINEZOLID 600 MG: 600 TABLET, FILM COATED ORAL at 08:28

## 2025-05-15 RX ADMIN — NEOMYCIN SULFATE, POLYMYXIN B SULFATE AND DEXAMETHASONE 1 DROP: 3.5; 10000; 1 SUSPENSION/ DROPS OPHTHALMIC at 20:29

## 2025-05-15 RX ADMIN — EZETIMIBE 10 MG: 10 TABLET ORAL at 08:28

## 2025-05-15 ASSESSMENT — ACTIVITIES OF DAILY LIVING (ADL)
ADLS_ACUITY_SCORE: 46
ADLS_ACUITY_SCORE: 46
ADLS_ACUITY_SCORE: 50
ADLS_ACUITY_SCORE: 48
ADLS_ACUITY_SCORE: 50
ADLS_ACUITY_SCORE: 48
ADLS_ACUITY_SCORE: 48
ADLS_ACUITY_SCORE: 50
ADLS_ACUITY_SCORE: 50
ADLS_ACUITY_SCORE: 48
ADLS_ACUITY_SCORE: 50
ADLS_ACUITY_SCORE: 50
ADLS_ACUITY_SCORE: 48
ADLS_ACUITY_SCORE: 46
ADLS_ACUITY_SCORE: 46

## 2025-05-15 NOTE — PLAN OF CARE
"/61   Pulse 66   Temp 97.7  F (36.5  C) (Oral)   Resp 17   Ht 1.727 m (5' 8\")   Wt 95.8 kg (211 lb 3.2 oz)   SpO2 96%   BMI 32.11 kg/m       No acute events overnight    Pain minimal-pt wanted to stay off narcotics so pain has being managed with scheduled tylenol .  CBI discontinued once the bags ran out per order. Gaines in place with clear output.   SBA/walker to bathroom . Continent of BB.LBM 5/15/24 .   PIV SL   "

## 2025-05-15 NOTE — PLAN OF CARE
Goal Outcome Evaluation:      Plan of Care Reviewed With: patient    Overall Patient Progress: improving    End of shift Summary: See flowsheet for VS and detail assessments.     Changes this Shift: No acute changes. A&Ox4, VSS, sats maintained on RA.  Pt reports he wears 2LNC overnight while sleeping at baseline. Passed to overnight RN.  Dyspnea with exertion, otherwise no chest pain or cough.  Gaines patent, clear yellow output. TOV tomorrow 5/16 per urology.  SBA with walker. Showered. In chair for most of shift.  Pain well managed with tylenol.  No PIV.  Call light within reach, able to make needs known.    Plan: Cont POC. Possible discharge home tomorrow.

## 2025-05-15 NOTE — PROGRESS NOTES
"Urology  Progress Note    24 hour events/Subjective:     - No acute events overnight   - Pain well controlled on current regimen   - Tolerating regular diet ; no nausea or vomiting   - OOB and ambulating    Exam  /61   Pulse 66   Temp 97.7  F (36.5  C) (Oral)   Resp 17   Ht 1.727 m (5' 8\")   Wt 95.8 kg (211 lb 3.2 oz)   SpO2 96%   BMI 32.11 kg/m    No acute distress  Unlabored breathing on RA  Abdomen soft, non tender, nondistended  Rubin with clear yellow drainage with CBI clamped    Intake/Output Summary (Last 24 hours) at 5/15/2025 0526  Last data filed at 5/14/2025 2226  Gross per 24 hour   Intake 1000 ml   Output 4800 ml   Net -3800 ml       UOP CBI     Labs    AM labs pending     Assessment/Plan  85 year old y/o male POD#1 s/p HOLEP.     Note - plan changes for today are in bold below.    Neuro: pain control: PRN oxycodone 5-10 mg q3h  and scheduled tylenol   CV: hemodynamically stable, continue to monitor   -PTA meds resumed: amlodipine, atorvastatin, ezetemibe   Pulm: incentive spirometry while awake  FEN/GI: Reg diet, no IVFs   Endo: No acute issues  : CBI discontinued, continue rubin catheter today, plan for TOV tomorrow prior to discharge; Detrol and B&O suppositories prn for bladder spasms   Heme/ID: Monitor s/s infection, F/u morning labs   -Linezolid x 7 days   Activity: Up as tolerated  Ambulate at least TID   Ppx: SCDs, ambulation  Dispo: Anticipate discharge home tomorrow following TOV    Seen and examined with the chief resident. Will discuss with Long Mena MD.    aTmiko Winter MD  Urology Resident PGY-2        Contacting the urology team: Please see [facility] and page on-call clinician with any questions or concerns regarding this patient. Note writer may be unavailable.     To access [employer] from intranet: under \"Applications\" --> \"Business Applications\" select \"[employer]\" and search \"Urology Adult & Pediatric/[facility].\" Please note that any question about a urology inpatient, " [place] or [place], should go to job code [id number].

## 2025-05-16 VITALS
DIASTOLIC BLOOD PRESSURE: 72 MMHG | HEART RATE: 68 BPM | RESPIRATION RATE: 16 BRPM | WEIGHT: 211.2 LBS | SYSTOLIC BLOOD PRESSURE: 133 MMHG | HEIGHT: 68 IN | OXYGEN SATURATION: 97 % | BODY MASS INDEX: 32.01 KG/M2 | TEMPERATURE: 98 F

## 2025-05-16 PROCEDURE — 250N000013 HC RX MED GY IP 250 OP 250 PS 637

## 2025-05-16 RX ORDER — LINEZOLID 600 MG/1
600 TABLET, FILM COATED ORAL 2 TIMES DAILY
Qty: 14 TABLET | Refills: 0 | Status: SHIPPED | OUTPATIENT
Start: 2025-05-16 | End: 2025-05-23

## 2025-05-16 RX ADMIN — ATORVASTATIN CALCIUM 40 MG: 40 TABLET, FILM COATED ORAL at 08:02

## 2025-05-16 RX ADMIN — AMLODIPINE BESYLATE 5 MG: 5 TABLET ORAL at 08:02

## 2025-05-16 RX ADMIN — GENTAMICIN SULFATE 1 DROP: 3 SOLUTION OPHTHALMIC at 10:52

## 2025-05-16 RX ADMIN — GENTAMICIN SULFATE 1 DROP: 3 SOLUTION OPHTHALMIC at 02:24

## 2025-05-16 RX ADMIN — NEOMYCIN SULFATE, POLYMYXIN B SULFATE AND DEXAMETHASONE 1 DROP: 3.5; 10000; 1 SUSPENSION/ DROPS OPHTHALMIC at 08:02

## 2025-05-16 RX ADMIN — ASPIRIN 81 MG: 81 TABLET ORAL at 08:02

## 2025-05-16 RX ADMIN — ACETAMINOPHEN 975 MG: 325 TABLET ORAL at 05:43

## 2025-05-16 RX ADMIN — TORSEMIDE 20 MG: 20 TABLET ORAL at 08:02

## 2025-05-16 RX ADMIN — PANTOPRAZOLE SODIUM 40 MG: 40 TABLET, DELAYED RELEASE ORAL at 08:02

## 2025-05-16 RX ADMIN — EZETIMIBE 10 MG: 10 TABLET ORAL at 08:02

## 2025-05-16 RX ADMIN — GENTAMICIN SULFATE 1 DROP: 3 SOLUTION OPHTHALMIC at 05:43

## 2025-05-16 RX ADMIN — PREGABALIN 150 MG: 75 CAPSULE ORAL at 08:02

## 2025-05-16 RX ADMIN — NEOMYCIN SULFATE, POLYMYXIN B SULFATE AND DEXAMETHASONE 1 DROP: 3.5; 10000; 1 SUSPENSION/ DROPS OPHTHALMIC at 12:46

## 2025-05-16 RX ADMIN — LINEZOLID 600 MG: 600 TABLET, FILM COATED ORAL at 08:02

## 2025-05-16 ASSESSMENT — ACTIVITIES OF DAILY LIVING (ADL)
ADLS_ACUITY_SCORE: 52
ADLS_ACUITY_SCORE: 52
ADLS_ACUITY_SCORE: 50
ADLS_ACUITY_SCORE: 52
ADLS_ACUITY_SCORE: 50
ADLS_ACUITY_SCORE: 52
ADLS_ACUITY_SCORE: 52
ADLS_ACUITY_SCORE: 50
ADLS_ACUITY_SCORE: 52

## 2025-05-16 NOTE — PLAN OF CARE
"Goal Outcome Evaluation:      Plan of Care Reviewed With: patient    Overall Patient Progress: improvingOverall Patient Progress: improving    Outcome Evaluation: cleared for discharge after PVRs    Nursing Assessment:  VT: /72   Pulse 68   Temp 98  F (36.7  C) (Oral)   Resp 16   Ht 1.727 m (5' 8\")   Wt 95.8 kg (211 lb 3.2 oz)   SpO2 97%   BMI 32.11 kg/m       Activity: Ind w/ walker     GI/: Urology team removed rubin in AM. 2 voids and PVRs completed. Provider notified    Pain Management:  Denied pain    Discharge Disposition:  Pt. discharged at 1422 to home, and left with personal belongings. Pt. received complete discharge paperwork and 1 medications as filled by discharge pharmacy. Pt. was given times of last dose for all discharge medications in writing on discharge medication sheets. Discharge teaching included pain management, activity restrictions, and signs and symptoms of infection. Pt. to follow up with PCP in 2 weeks. Pt. had no further questions at the time of discharge and no unmet needs were identified.     "

## 2025-05-16 NOTE — PROGRESS NOTES
"Urology  Progress Note    24 hour events/Subjective:     - No acute events overnight   - Pain well controlled on current regimen   - Tolerating regular diet ; no nausea or vomiting   - OOB and ambulating    Exam  /63 (BP Location: Right arm)   Pulse 68   Temp 98  F (36.7  C) (Oral)   Resp 16   Ht 1.727 m (5' 8\")   Wt 95.8 kg (211 lb 3.2 oz)   SpO2 97%   BMI 32.11 kg/m    No acute distress  Unlabored breathing on RA  Abdomen soft, non tender, nondistended  Rubin with clear yellow drainage with CBI clamped    Intake/Output Summary (Last 24 hours) at 5/15/2025 0526  Last data filed at 5/14/2025 2226  Gross per 24 hour   Intake 1000 ml   Output 4800 ml   Net -3800 ml       UOP 3375    Labs    AM labs pending     Assessment/Plan  85 year old y/o male POD#2 s/p HOLEP.     Note - plan changes for today are in bold below.    Neuro: pain control: PRN oxycodone 5-10 mg q3h  and scheduled tylenol   CV: hemodynamically stable, continue to monitor   -PTA meds resumed: amlodipine, atorvastatin, ezetemibe   Pulm: incentive spirometry while awake  FEN/GI: Reg diet, no IVFs   Endo: No acute issues  : CBI discontinued, rubin catheter removed for trial of void, page urology with voided volume/PVR, Detrol and B&O suppositories prn for bladder spasms   Heme/ID: Monitor s/s infection, F/u morning labs   -Linezolid x 7 days   Activity: Up as tolerated, Ambulate at least TID   Ppx: SCDs, ambulation  Dispo: Anticipate discharge home following TOV    Seen and examined with the chief resident. Will discuss with Long Mena MD.    Tamiko Winter MD  Urology Resident PGY-2        Contacting the urology team: Please see [employer] and page on-call clinician with any questions or concerns regarding this patient. Note writer may be unavailable.     To access [employer] from intranet: under \"Applications\" --> \"Business Applications\" select \"[employer]\" and search \"Urology Adult & Pediatric/[facility].\" Please note that any question about a " urology inpatient, [place] or [place], should go to job code [id number].

## 2025-05-16 NOTE — PLAN OF CARE
Goal Outcome Evaluation:    Received alert and oriented x4, in RA.  Complained of mild pain, Tylenol given.  Denies N/V, SOB and chest discomfort.  Baseline: intermittent numbness/tingling sensation on B/L [place] & [place] b/l ears  Due oral meds given.   Gaines is draining well.  Ambulated to bathroom using walker. Independent.  Reminded to use call light for assistance.  Discharge plan to home today.

## 2025-05-16 NOTE — DISCHARGE SUMMARY
[facility] UroDischarge Summary    Patient: Guevara Jones    MRN: [id number]   : 1940         Date of Admission:  2025   Date of Discharge::  2025  Admitting Physician:  Long Mena MD  Discharge Physician:  Tamiko Winter MD             Admission Diagnoses:   Enlarged prostate [N40.0]  Benign prostatic hyperplasia [N40.0]  Past Medical History:   Diagnosis Date    (HFpEF) heart failure with preserved ejection fraction (H)     Anemia     Ataxia     BPH (benign prostatic hyperplasia)     CAD (coronary artery disease)     CKD (chronic kidney disease) stage 3, GFR 30-59 ml/min (H)     DEXTER (dyspnea on exertion)     Edema     Gastroesophageal reflux disease with esophagitis     Granulomatosis with polyangiitis (H)     Hepatic steatosis     History of concussion     History of CVA (cerebrovascular accident)     HLD (hyperlipidemia)     HTN (hypertension)     Insomnia     Obesity     Osteoarthritis     PFO (patent foramen ovale)     Pulmonary nodules     Spinal stenosis of lumbar region with neurogenic claudication     Urinary retention              Discharge Diagnosis:     Enlarged prostate [N40.0]  Past Medical History:   Diagnosis Date    (HFpEF) heart failure with preserved ejection fraction (H)     Anemia     Ataxia     BPH (benign prostatic hyperplasia)     CAD (coronary artery disease)     CKD (chronic kidney disease) stage 3, GFR 30-59 ml/min (H)     DEXTER (dyspnea on exertion)     Edema     Gastroesophageal reflux disease with esophagitis     Granulomatosis with polyangiitis (H)     Hepatic steatosis     History of concussion     History of CVA (cerebrovascular accident)     HLD (hyperlipidemia)     HTN (hypertension)     Insomnia     Obesity     Osteoarthritis     PFO (patent foramen ovale)     Pulmonary nodules     Spinal stenosis of lumbar region with neurogenic claudication     Urinary retention               Procedures:     Procedure(s): CO LASER ENUCLEATION PROSTATE W  MORCELLATION [06587] (Holmium Laser Enucleation of the Prostate)               Medications Prior to Admission:     Medications Prior to Admission   Medication Sig Dispense Refill Last Dose/Taking    acetaminophen (TYLENOL) 325 MG tablet Take 2 tablets (650 mg) by mouth every 4 hours as needed for pain (Patient taking differently: Take 650 mg by mouth 3 times daily.) 60 tablet 0 5/14/2025 at  4:30 AM    amLODIPine (NORVASC) 5 MG tablet Take 5 mg by mouth 2 times daily.   5/14/2025 at  4:00 AM    Ascorbic Acid (VITAMIN C) POWD Take 1 tablet by mouth 3 times daily.   Past Week    Aspirin 81 MG CAPS Take 81 mg by mouth every morning.   Past Week    Cranberry 125 MG TABS Take by mouth every morning.   Past Week    Desiccated Beef Liver POWD every morning.   Past Week    Digestive Enzymes CAPS Take 1 capsule by mouth 2 times daily.   Past Week    diphenhydrAMINE-acetaminophen (TYLENOL PM)  MG tablet Take 1 tablet by mouth at bedtime.   Past Week    ezetimibe (ZETIA) 10 MG tablet Take 10 mg by mouth every morning.   Past Week    gentamicin (GARAMYCIN) 0.3 % ophthalmic solution Place 1 drop into both eyes every 4 hours.   5/14/2025 at  4:00 AM    linezolid (ZYVOX) 600 MG tablet Take 1 tablet (600 mg) by mouth 2 times daily for 5 days. 10 tablet 0 5/14/2025 at  4:00 AM    LIPITOR 40 MG tablet Take 40 mg by mouth every morning.   5/14/2025 at  4:00 AM    neomycin-polymyxin-dexAMETHasone (MAXITROL) 3.5-24573-6.1 SUSP ophthalmic susp Place 1 drop into both eyes 4 times daily.   5/14/2025 at  4:00 AM    omega 3 1000 MG CAPS Take 1 capsule by mouth daily.   Past Week    ondansetron (ZOFRAN ODT) 8 MG ODT tab Take 8 mg by mouth 2 times daily as needed for nausea.   Past Month    pantoprazole (PROTONIX) 40 MG EC tablet Take 40 mg by mouth every morning.   5/14/2025 at  4:00 AM    PREBIOTIC PRODUCT PO Take 1 capsule by mouth 2 times daily.   Past Week    pregabalin (LYRICA) 75 MG capsule Take 150 mg by mouth 2 times daily.    "5/14/2025 at  4:00 AM    senna-docusate (SENOKOT-S/PERICOLACE) 8.6-50 MG tablet Take 1 tablet by mouth daily (Patient taking differently: Take 1 tablet by mouth 2 times daily.) 30 tablet 0 Past Week    torsemide (DEMADEX) 20 MG tablet Take 20 mg by mouth every morning.   Past Week    Turmeric (CURCUMIN 95) 500 MG CAPS Take 750 mg by mouth 2 times daily.   Past Week    UNABLE TO FIND daily. MEDICATION NAME: Lion's nikita   Past Week    UNABLE TO FIND Take 1 tablet by mouth every morning. MEDICATION NAME: MICHAEL FOOD \"IRON BLOOD BUILDER\"  SUPPLEMENT PLANT BASED   Past Week    vitamin B complex with vitamin C (VITAMIN  B COMPLEX) tablet Take 1 tablet by mouth daily.   Past Week    Vitamin D, Cholecalciferol, 10 MCG (400 UNIT) TABS Take by mouth daily.   Past Week    nitroGLYcerin (NITROSTAT) 0.4 MG sublingual tablet Place 0.4 mg under the tongue every 5 minutes as needed for chest pain.   Unknown    red yeast rice 600 MG CAPS Take 600 mg by mouth daily (Patient not taking: Reported on 5/7/2025)                Discharge Medications:     Current Discharge Medication List        START taking these medications    Details   !! linezolid (ZYVOX) 600 MG tablet Take 1 tablet (600 mg) by mouth 2 times daily for 7 days.  Qty: 14 tablet, Refills: 0    Associated Diagnoses: Benign prostatic hyperplasia with lower urinary tract symptoms, symptom details unspecified       !! - Potential duplicate medications found. Please discuss with provider.        CONTINUE these medications which have NOT CHANGED    Details   acetaminophen (TYLENOL) 325 MG tablet Take 2 tablets (650 mg) by mouth every 4 hours as needed for pain  Qty: 60 tablet, Refills: 0    Associated Diagnoses: Spinal stenosis of lumbar region with neurogenic claudication      amLODIPine (NORVASC) 5 MG tablet Take 5 mg by mouth 2 times daily.      Ascorbic Acid (VITAMIN C) POWD Take 1 tablet by mouth 3 times daily.      Aspirin 81 MG CAPS Take 81 mg by mouth every morning.    " "  Cranberry 125 MG TABS Take by mouth every morning.      Desiccated Beef Liver POWD every morning.      Digestive Enzymes CAPS Take 1 capsule by mouth 2 times daily.      diphenhydrAMINE-acetaminophen (TYLENOL PM)  MG tablet Take 1 tablet by mouth at bedtime.      ezetimibe (ZETIA) 10 MG tablet Take 10 mg by mouth every morning.      gentamicin (GARAMYCIN) 0.3 % ophthalmic solution Place 1 drop into both eyes every 4 hours.      !! linezolid (ZYVOX) 600 MG tablet Take 1 tablet (600 mg) by mouth 2 times daily for 5 days.  Qty: 10 tablet, Refills: 0    Comments: Do not take medication on surgery day 05/14/2025  Associated Diagnoses: Urinary tract infection      LIPITOR 40 MG tablet Take 40 mg by mouth every morning.      neomycin-polymyxin-dexAMETHasone (MAXITROL) 3.5-29012-2.1 SUSP ophthalmic susp Place 1 drop into both eyes 4 times daily.      omega 3 1000 MG CAPS Take 1 capsule by mouth daily.      ondansetron (ZOFRAN ODT) 8 MG ODT tab Take 8 mg by mouth 2 times daily as needed for nausea.      pantoprazole (PROTONIX) 40 MG EC tablet Take 40 mg by mouth every morning.      PREBIOTIC PRODUCT PO Take 1 capsule by mouth 2 times daily.      pregabalin (LYRICA) 75 MG capsule Take 150 mg by mouth 2 times daily.      senna-docusate (SENOKOT-S/PERICOLACE) 8.6-50 MG tablet Take 1 tablet by mouth daily  Qty: 30 tablet, Refills: 0    Associated Diagnoses: Spinal stenosis of lumbar region with neurogenic claudication      torsemide (DEMADEX) 20 MG tablet Take 20 mg by mouth every morning.      Turmeric (CURCUMIN 95) 500 MG CAPS Take 750 mg by mouth 2 times daily.      !! UNABLE TO FIND daily. MEDICATION NAME: Lion's nikita      !! UNABLE TO FIND Take 1 tablet by mouth every morning. MEDICATION NAME: MICHAEL FOOD \"IRON BLOOD BUILDER\"  SUPPLEMENT PLANT BASED      vitamin B complex with vitamin C (VITAMIN  B COMPLEX) tablet Take 1 tablet by mouth daily.      Vitamin D, Cholecalciferol, 10 MCG (400 UNIT) TABS Take by mouth " "daily.      nitroGLYcerin (NITROSTAT) 0.4 MG sublingual tablet Place 0.4 mg under the tongue every 5 minutes as needed for chest pain.      red yeast rice 600 MG CAPS Take 600 mg by mouth daily       !! - Potential duplicate medications found. Please discuss with provider.                Consultations:   Consultation during this admission received:   None          Brief History of Illness:    Guevara Jones is a 85 year old year old male who presented with urinary retention and enlarged prostate of approximately 200 grams.  After a discussion of all risks, benefits, and alternatives, the patient elected to proceed with HoLEP.            Hospital Course:   The patient's hospital course was unremarkable.  Guevara Jones recovered as anticipated and experienced no post-operative complications.      On POD #2 he was ambulating without assitance, tolerating the discharge diet, had pain controlled with PO medications to go home with, and was requiring no IV medications or fluids. On POD#2 he underwent a successful trial of void and discharged without a catheter. He was discharged to home with appropriate contact information, follow-up and instructions as seen below in the discharge paperwork.         Discharge Labs:     No results found for: \"PSA\"  Recent Labs   Lab 05/15/25  0605 05/14/25  0856   WBC 9.0  --    HGB 11.4* 12.2*     --        Recent Labs   Lab 05/15/25  1657 05/15/25  0605 05/14/25  0856   NA  --  142  --    POTASSIUM  --  3.8  --    CHLORIDE  --  109*  --    CO2  --  22  --    BUN  --  19.6  --    CR  --  1.51* 1.96*   * 94  --    TALHA  --  8.5*  --      No lab results found in last 7 days.    Invalid input(s): \"URINEBLOOD\"  No results found for this or any previous visit.         Discharge Instructions and Follow-Up:    HoLEP    Activity  - No strenuous exercise for 2 weeks.  - No lifting, pushing, pulling more than 20 pounds for 2 weeks.  - Do not strain with bowel movements.  - Do not " "drive until you can press the brake pedal quickly and fully without pain.  - Do not operate a motor vehicle while taking narcotic pain medications.    Medications  - You should finish the antibiotic you were prescribed prior to the surgery. Continue linezolid for 7 days  - Take over the counter pain medications for post-operative pain control.  Wean yourself off all pain medications as you are able.  - Some pain medications contain both tylenol (acetaminophen) and a narcotic (Norco, vicodin, percocet), do not take more than 4,000mg of Tylenol (acetaminophen) from all sources in any 24 hour period.  - Narcotics can make you constipated.  Take over the counter fiber (metamucil or benefiber) and stool softeners (miralax, docusate or senna) while taking narcotic pain medications, but stop if you develop diarrhea.  - No driving or operating machinery while taking narcotic pain medications    Follow-Up:   - Schedule an appointment to be seen by your primary care provider within 7-10 days of discharge for a postoperative checkup.   - The urology clinic will reach out to you to arrange your follow up appointment  - Call or return sooner than your regularly scheduled visit if you develop any of the following:  Fever (greater than 101.3F), uncontrolled pain, uncontrolled nausea or vomiting, concerns about bowel function, as well as increased redness, swelling, drainage from your wound or any concerns about urinating or urinary catheter drainage.      Phone numbers:   - Monday through Friday 8am to 4:30pm: Call [phone] with questions, requests for medication refills, or to schedule or confirm an appointment.  - Nights, weekends, or holidays: call the after hours emergency pager - [phone] and tell the  \"I would like to page the Urology Resident on call.\" Typically, the on-call provider should return your call within 30 minutes.  Please page the on-call provider again if you haven't been contacted as expected.  " "Rarely, the on-call provider will be unable to promptly return a call due to a hospital emergency.  If you have paged twice and are still not contacted, ask the hospital  to page the \"urology CHIEF-RESIDENT on call\".   - Please note that due to prescribing laws, resident physicians are unable to prescribe narcotics after-hours. If you feel as though you will need a refill of a narcotic pain medication, you will need to call the clinic during business hours OR seek emergency care.  - For emergencies, call 911         Discharge Disposition:     Discharged to home      Attestation: I have reviewed today's vital signs, notes, medications, labs and imaging.    Candy Francis MD  Urology Resident, PGY2          "

## 2025-05-19 ENCOUNTER — PATIENT OUTREACH (OUTPATIENT)
Dept: CARE COORDINATION | Facility: CLINIC | Age: 85
End: 2025-05-19
Payer: COMMERCIAL

## 2025-05-19 LAB
PATH REPORT.COMMENTS IMP SPEC: NORMAL
PATH REPORT.COMMENTS IMP SPEC: NORMAL
PATH REPORT.FINAL DX SPEC: NORMAL
PATH REPORT.GROSS SPEC: NORMAL
PATH REPORT.RELEVANT HX SPEC: NORMAL
PHOTO IMAGE: NORMAL

## 2025-05-19 NOTE — PROGRESS NOTES
Clinic Care Coordination Contact  Transitions of Care Outreach  Chief Complaint   Patient presents with    Clinic Care Coordination - Post Hospital       Most Recent Admission Date: 5/14/2025   Most Recent Admission Diagnosis: Enlarged prostate - N40.0  Benign prostatic hyperplasia - N40.0     Most Recent Discharge Date: 5/16/2025   Most Recent Discharge Diagnosis: Benign prostatic hyperplasia with lower urinary tract symptoms, symptom details unspecified - N40.1     Transitions of Care Assessment    Discharge Assessment  How are you doing now that you are home?: Patient shares that he is doing well. He is aware that Urology left a message to schedule appt and will call back. Patient shares that he has some help in the home and transportation right now, but may need additional help in the future. We discussed how agencies can assist with this for future needs. Writer encourages patient to reach out to PCP or Senior services when he gets to this point. No other questions/concerns or needs at this time.  How are your symptoms? (Red Flag symptoms escalate to triage hotline per guidelines): Improved  Do you know how to contact your clinic care team if you have future questions or changes to your health status? : Yes  Does the patient have their discharge instructions? : Yes  Does the patient have questions regarding their discharge instructions? : No  Were you started on any new medications or were there changes to any of your previous medications? : Yes  Does the patient have all of their medications?: Yes  Do you have questions regarding any of your medications? : No  Do you have all of your needed medical supplies or equipment (DME)?  (i.e. oxygen tank, CPAP, cane, etc.): Yes    Post-op ([facility] Only)  If the patient had a surgery or procedure, do they have any questions for a nurse?: No    Post-op (Clinicians Only)  Did the patient have surgery or a procedure: Yes  Incision: closed  Fever: No  Chills: No  Redness:  No  Warmth: No  Swelling: No  Incision site pain: No        Follow up Plan     Discharge Follow-Up  Discharge follow up appointment scheduled in alignment with recommended follow up timeframe or Transitions of Risk Category? (Low = within 30 days; Moderate= within 14 days; High= within 7 days): No  Patient's follow up appointment not scheduled: Patient declined scheduling support. Education on the importance of transitions of care follow up. Provided scheduling phone number. (Patient will call Urology back to schedule follow up)    No future appointments.    Outpatient Plan as outlined on AVS reviewed with patient.    For any urgent concerns, please contact our 24 hour nurse triage line: [phone] ([phone])       EDOUARD Dick

## 2025-05-29 ENCOUNTER — DOCUMENTATION ONLY (OUTPATIENT)
Dept: OTHER | Facility: CLINIC | Age: 85
End: 2025-05-29
Payer: COMMERCIAL

## 2025-07-02 ENCOUNTER — TELEPHONE (OUTPATIENT)
Dept: UROLOGY | Facility: CLINIC | Age: 85
End: 2025-07-02
Payer: COMMERCIAL

## 2025-07-02 NOTE — CONFIDENTIAL NOTE
OC to pt to follow up post op and check in.  Pt did not read mychart sent on 06/09 from Dr Mena.  LM with details and that he can call me or send mychart to communicate.  Upated provider as well  Loni RN  Care Coordinator Urology  [phone]

## 2025-08-19 ENCOUNTER — VIRTUAL VISIT (OUTPATIENT)
Dept: UROLOGY | Facility: CLINIC | Age: 85
End: 2025-08-19
Payer: COMMERCIAL

## 2025-08-19 DIAGNOSIS — R33.9 URINARY RETENTION: Primary | ICD-10-CM

## 2025-08-19 PROCEDURE — 98005 SYNCH AUDIO-VIDEO EST LOW 20: CPT | Performed by: UROLOGY

## 2025-08-19 PROCEDURE — 1126F AMNT PAIN NOTED NONE PRSNT: CPT | Mod: 95 | Performed by: UROLOGY

## (undated) DEVICE — DRSG TEGADERM 4X4 3/4" 1626W

## (undated) DEVICE — SYR 50ML LL W/O NDL 309653

## (undated) DEVICE — SOL WATER IRRIG 1000ML BOTTLE 2F7114

## (undated) DEVICE — SU PROLENE 5-0 RB-2DA 30" 8710H

## (undated) DEVICE — DRAPE MICROSCOPE MICRO-KOVER LEICA 48"X120" 09-MK651

## (undated) DEVICE — Device

## (undated) DEVICE — NDL ANGIOCATH 14GA 1.25" 4048

## (undated) DEVICE — SOL NACL 0.9% IRRIG 1000ML BOTTLE 2F7124

## (undated) DEVICE — TUBING SUCTION MEDI-VAC 1/4"X20' N620A

## (undated) DEVICE — STRAP POSITIONING 60X31" BODY KNEE KBS 01

## (undated) DEVICE — DRSG DRAIN 4X4" 7086

## (undated) DEVICE — CATH FOLEY 14FR 5ML SILICONE LUBRI-SIL 175814

## (undated) DEVICE — SU MONOCRYL 3-0 PS-2 18" UND Y497G

## (undated) DEVICE — SOLUTION WATER 1000ML BOTTLE R5000-01

## (undated) DEVICE — POSITIONER ARMBOARD FOAM 1PAIR LF FP-ARMB1

## (undated) DEVICE — SOLUTION IRRIGATION 0.9% NACL 1000ML BOTTLE R5200-01

## (undated) DEVICE — SYR BULB IRRIG 50ML LATEX FREE 0035280

## (undated) DEVICE — TUBING SET THERMEDX UROLOGY SGL USE LL0006

## (undated) DEVICE — SU VICRYL 1 CT-1 CR 8X18" J741D

## (undated) DEVICE — SOLUTION IV IRRIGATION 0.9% NACL 3L R8206

## (undated) DEVICE — LASER FIBER HOLMIUM MOSES 550 D/F/L AC-10030120

## (undated) DEVICE — DRAIN HEMOVAC BAG 00-1500-050-10

## (undated) DEVICE — ADH SKIN CLOSURE PREMIERPRO EXOFIN 1.0ML 3470

## (undated) DEVICE — NDL SPINAL 20GA 3.5" 405182

## (undated) DEVICE — SYR PISTON IRRIGATION 60 ML DYND20325

## (undated) DEVICE — SU VICRYL 2-0 CT-2 27" UND J269H

## (undated) DEVICE — DECANTER TRANSFER DEVICE 2008S

## (undated) DEVICE — LINEN TOWEL PACK X5 5464

## (undated) DEVICE — GLOVE BIOGEL PI MICRO SZ 7.0 48570

## (undated) DEVICE — CATH FOLEY 3WAY 22FR 30ML SIL 73022SI

## (undated) DEVICE — BLADE MORCELLATOR WOLF PIRANHA 4.75X385MM 49700103

## (undated) DEVICE — TOOL DISSECT MIDAS MR8 14CM MATCH HEAD 3MM MR8-14MH30

## (undated) DEVICE — DEVICE CATH STABILIZATION STATLOCK FOLEY 3-WAY FOL0105

## (undated) DEVICE — LINEN GOWN X4 5410

## (undated) DEVICE — GLOVE PROTEXIS BLUE W/NEU-THERA 8.5  2D73EB85

## (undated) DEVICE — TUBING SET PIRANHA 41702208

## (undated) DEVICE — DRAPE MAYO STAND 23X54 8337

## (undated) DEVICE — FILTER PIRANHA DISP 2228.901

## (undated) DEVICE — TUBING SUCTION MEDI-VAC SOFT 3/16"X20' N520A

## (undated) DEVICE — SUCTION MANIFOLD NEPTUNE 2 SYS 4 PORT 0702-020-000

## (undated) DEVICE — PREP DYNA-HEX 4% CHG SCRUB 4OZ BOTTLE MDS098710

## (undated) DEVICE — CATH LASER URETERAL 7.1FRX40CM G17797  022403-7.1-40

## (undated) DEVICE — SUCTION FRAZIER 12FR W/HANDLE K73

## (undated) DEVICE — PREP CHLORAPREP 26ML TINTED HI-LITE ORANGE 930815

## (undated) DEVICE — SU VICRYL 0 CT-1 27" J340H

## (undated) DEVICE — SOL ISOPROPYL RUBBING ALCOHOL USP 70% 4OZ HDX-20 I0020

## (undated) DEVICE — BAG URINARY DRAIN 4000ML LF 153509

## (undated) DEVICE — PAD FLOOR WATERPROOF 24"X40" FM0102P

## (undated) DEVICE — GLOVE PROTEXIS MICRO 8.0  2D73PM80

## (undated) DEVICE — LINEN BACK PACK 5440

## (undated) DEVICE — SPONGE SURGIFOAM 12 1972

## (undated) DEVICE — DRSG PRIMAPORE 03 1/8X6" 66000318

## (undated) DEVICE — SPECIMEN TRAP TISSUE CONTAINER PIRANHA 2208120

## (undated) RX ORDER — HYDROMORPHONE HYDROCHLORIDE 1 MG/ML
INJECTION, SOLUTION INTRAMUSCULAR; INTRAVENOUS; SUBCUTANEOUS
Status: DISPENSED
Start: 2021-11-01

## (undated) RX ORDER — FENTANYL CITRATE 50 UG/ML
INJECTION, SOLUTION INTRAMUSCULAR; INTRAVENOUS
Status: DISPENSED
Start: 2021-11-01

## (undated) RX ORDER — ACETAMINOPHEN 325 MG/1
TABLET ORAL
Status: DISPENSED
Start: 2021-11-01

## (undated) RX ORDER — FENTANYL CITRATE-0.9 % NACL/PF 10 MCG/ML
PLASTIC BAG, INJECTION (ML) INTRAVENOUS
Status: DISPENSED
Start: 2021-11-01

## (undated) RX ORDER — HYDROMORPHONE HYDROCHLORIDE 1 MG/ML
INJECTION, SOLUTION INTRAMUSCULAR; INTRAVENOUS; SUBCUTANEOUS
Status: DISPENSED
Start: 2025-05-14

## (undated) RX ORDER — LIDOCAINE HYDROCHLORIDE 20 MG/ML
INJECTION, SOLUTION EPIDURAL; INFILTRATION; INTRACAUDAL; PERINEURAL
Status: DISPENSED
Start: 2021-11-01

## (undated) RX ORDER — PROPOFOL 10 MG/ML
INJECTION, EMULSION INTRAVENOUS
Status: DISPENSED
Start: 2025-05-14

## (undated) RX ORDER — VANCOMYCIN HYDROCHLORIDE 500 MG/10ML
INJECTION, POWDER, LYOPHILIZED, FOR SOLUTION INTRAVENOUS
Status: DISPENSED
Start: 2021-11-01

## (undated) RX ORDER — FENTANYL CITRATE 50 UG/ML
INJECTION, SOLUTION INTRAMUSCULAR; INTRAVENOUS
Status: DISPENSED
Start: 2025-05-14

## (undated) RX ORDER — PROPOFOL 10 MG/ML
INJECTION, EMULSION INTRAVENOUS
Status: DISPENSED
Start: 2021-11-01

## (undated) RX ORDER — DEXAMETHASONE SODIUM PHOSPHATE 10 MG/ML
INJECTION, SOLUTION INTRAMUSCULAR; INTRAVENOUS
Status: DISPENSED
Start: 2022-02-24

## (undated) RX ORDER — ACETAMINOPHEN 325 MG/1
TABLET ORAL
Status: DISPENSED
Start: 2025-05-14

## (undated) RX ORDER — ROCURONIUM BROMIDE 50 MG/5 ML
SYRINGE (ML) INTRAVENOUS
Status: DISPENSED
Start: 2021-11-01

## (undated) RX ORDER — EPHEDRINE SULFATE 50 MG/ML
INJECTION, SOLUTION INTRAMUSCULAR; INTRAVENOUS; SUBCUTANEOUS
Status: DISPENSED
Start: 2025-05-14

## (undated) RX ORDER — CEFAZOLIN SODIUM 2 G/100ML
INJECTION, SOLUTION INTRAVENOUS
Status: DISPENSED
Start: 2021-11-01

## (undated) RX ORDER — ONDANSETRON 2 MG/ML
INJECTION INTRAMUSCULAR; INTRAVENOUS
Status: DISPENSED
Start: 2021-11-01

## (undated) RX ORDER — OXYCODONE HYDROCHLORIDE 5 MG/1
TABLET ORAL
Status: DISPENSED
Start: 2021-11-01

## (undated) RX ORDER — DEXAMETHASONE SODIUM PHOSPHATE 4 MG/ML
INJECTION, SOLUTION INTRA-ARTICULAR; INTRALESIONAL; INTRAMUSCULAR; INTRAVENOUS; SOFT TISSUE
Status: DISPENSED
Start: 2021-11-01

## (undated) RX ORDER — SODIUM CHLORIDE, SODIUM LACTATE, POTASSIUM CHLORIDE, CALCIUM CHLORIDE 600; 310; 30; 20 MG/100ML; MG/100ML; MG/100ML; MG/100ML
INJECTION, SOLUTION INTRAVENOUS
Status: DISPENSED
Start: 2021-11-01

## (undated) RX ORDER — FENTANYL CITRATE-0.9 % NACL/PF 10 MCG/ML
PLASTIC BAG, INJECTION (ML) INTRAVENOUS
Status: DISPENSED
Start: 2025-05-14

## (undated) RX ORDER — LIDOCAINE HYDROCHLORIDE 10 MG/ML
INJECTION, SOLUTION INFILTRATION; PERINEURAL
Status: DISPENSED
Start: 2022-02-24

## (undated) RX ORDER — LIDOCAINE HYDROCHLORIDE 10 MG/ML
INJECTION, SOLUTION EPIDURAL; INFILTRATION; INTRACAUDAL; PERINEURAL
Status: DISPENSED
Start: 2022-02-24